# Patient Record
Sex: FEMALE | Race: WHITE | NOT HISPANIC OR LATINO | Employment: UNEMPLOYED | URBAN - METROPOLITAN AREA
[De-identification: names, ages, dates, MRNs, and addresses within clinical notes are randomized per-mention and may not be internally consistent; named-entity substitution may affect disease eponyms.]

---

## 2017-05-12 ENCOUNTER — OFFICE VISIT (OUTPATIENT)
Dept: PULMONOLOGY | Facility: CLINIC | Age: 47
End: 2017-05-12

## 2017-05-12 VITALS
DIASTOLIC BLOOD PRESSURE: 70 MMHG | OXYGEN SATURATION: 96 % | WEIGHT: 129.4 LBS | HEIGHT: 66 IN | HEART RATE: 91 BPM | RESPIRATION RATE: 18 BRPM | BODY MASS INDEX: 20.79 KG/M2 | TEMPERATURE: 98.1 F | SYSTOLIC BLOOD PRESSURE: 118 MMHG

## 2017-05-12 DIAGNOSIS — J44.9 CHRONIC OBSTRUCTIVE PULMONARY DISEASE, UNSPECIFIED COPD TYPE (HCC): ICD-10-CM

## 2017-05-12 DIAGNOSIS — C34.90 MALIGNANT NEOPLASM OF LUNG, UNSPECIFIED LATERALITY, UNSPECIFIED PART OF LUNG (HCC): Primary | ICD-10-CM

## 2017-05-12 DIAGNOSIS — J30.89 PERENNIAL ALLERGIC RHINITIS, UNSPECIFIED ALLERGIC RHINITIS TRIGGER: ICD-10-CM

## 2017-05-12 DIAGNOSIS — C34.91 ADENOCARCINOMA OF RIGHT LUNG, STAGE 1 (HCC): ICD-10-CM

## 2017-05-12 DIAGNOSIS — R06.02 SHORTNESS OF BREATH: ICD-10-CM

## 2017-05-12 PROBLEM — J30.9 ALLERGIC RHINITIS: Status: ACTIVE | Noted: 2017-05-12

## 2017-05-12 PROCEDURE — 94726 PLETHYSMOGRAPHY LUNG VOLUMES: CPT | Performed by: INTERNAL MEDICINE

## 2017-05-12 PROCEDURE — 94060 EVALUATION OF WHEEZING: CPT | Performed by: INTERNAL MEDICINE

## 2017-05-12 PROCEDURE — 94729 DIFFUSING CAPACITY: CPT | Performed by: INTERNAL MEDICINE

## 2017-05-12 PROCEDURE — 94200 LUNG FUNCTION TEST (MBC/MVV): CPT | Performed by: INTERNAL MEDICINE

## 2017-05-12 PROCEDURE — 99214 OFFICE O/P EST MOD 30 MIN: CPT | Performed by: INTERNAL MEDICINE

## 2017-05-12 RX ORDER — CETIRIZINE HYDROCHLORIDE 10 MG/1
10 TABLET ORAL DAILY
COMMUNITY

## 2017-05-24 ENCOUNTER — HOSPITAL ENCOUNTER (OUTPATIENT)
Dept: CT IMAGING | Facility: HOSPITAL | Age: 47
Discharge: HOME | End: 2017-05-24
Attending: INTERNAL MEDICINE | Admitting: INTERNAL MEDICINE

## 2017-05-24 DIAGNOSIS — C34.91 ADENOCARCINOMA OF RIGHT LUNG, STAGE 1 (HCC): ICD-10-CM

## 2017-05-24 PROCEDURE — 71260 CT THORAX DX C+: CPT

## 2017-05-24 PROCEDURE — 0 IOPAMIDOL 61 % SOLUTION: Performed by: INTERNAL MEDICINE

## 2017-05-24 RX ADMIN — IOPAMIDOL 100 ML: 612 INJECTION, SOLUTION INTRAVENOUS at 14:00

## 2017-07-18 ENCOUNTER — OFFICE VISIT (OUTPATIENT)
Dept: CARDIAC SURGERY | Facility: CLINIC | Age: 47
End: 2017-07-18

## 2017-07-18 VITALS
OXYGEN SATURATION: 92 % | SYSTOLIC BLOOD PRESSURE: 105 MMHG | DIASTOLIC BLOOD PRESSURE: 76 MMHG | WEIGHT: 129 LBS | BODY MASS INDEX: 20.73 KG/M2 | HEART RATE: 101 BPM | TEMPERATURE: 97.6 F | HEIGHT: 66 IN

## 2017-07-18 DIAGNOSIS — C34.91 ADENOCARCINOMA OF RIGHT LUNG, STAGE 1 (HCC): Primary | ICD-10-CM

## 2017-07-18 PROCEDURE — 99212 OFFICE O/P EST SF 10 MIN: CPT | Performed by: PHYSICIAN ASSISTANT

## 2017-07-18 NOTE — PROGRESS NOTES
07/18/2017  Patient Information  Moriah Qiu                                                                                          [address]  [place] [address]   1970  'PCP/Referring Physician'  Darline Velasco MD  [phone]  No ref. provider found    Chief Complaint   Patient presents with   • Follow-up     6 month follow up with CT chest. Patient complains of some shortness of breath.   • Lung Cancer       History of Present Illness:  Patient presents to office today for results of her 6 month follow up CT scan of the chest. Patient underwent a Right VAT with right lower lobectomy on 9/15/16 due to right lower lobe adenocarcinoma. The pathologic stage on her final path report was 1A. She states she has had recent shortness of breath, which she attributes to her allergies. She is currently taking allergy medication to combat this. She denies any incisional tenderness or recent hemoptysis. Her recent CT scan of the chest on 5/25/17 reveals no findings suspicious of malignancy or metastasis, which was discussed with the patient and she was understanding. She is otherwise doing well.     Patient Active Problem List   Diagnosis   • Osteoporosis   • Syringomyelia   • Adenocarcinoma of right lung, lower lobe, stage 1   • Osteoarthritis (arthritis due to wear and tear of joints)   • COPD (chronic obstructive pulmonary disease)   • Allergic rhinitis     Past Medical History:   Diagnosis Date   • Back pain    • Esophagus hernia    • GERD (gastroesophageal reflux disease)    • Lung cancer    • Osteoarthritis (arthritis due to wear and tear of joints)    • Osteoporosis    • Uterine cancer 1997    ovarian 1997   • Wears dentures      Past Surgical History:   Procedure Laterality Date   • BRONCHOSCOPY N/A 8/4/2016    Procedure: NAVIGATIONAL BRONCHOSCOPY;  Surgeon: Elder Tompkins MD;  Location: [facility];  Service:    • BRONCHOSCOPY N/A 8/4/2016    Procedure: BRONCHOSCOPY WITH ENDOBRONCHIAL  ULTRASOUND;  Surgeon: Elder Tompkins MD;  Location:  [facility];  Service:    • COLONOSCOPY      2016   • GALLBLADDER SURGERY  2016   • HYSTERECTOMY  1997   • THORACOSCOPY Right 9/15/2016    Procedure: BRONOSCOPY, RIGHT THORACOSCOPY VIDEO ASSISTED LOBECTOMY, MEDIASTINAL LYMPH NODE DISECTION;  Surgeon: Elder Ham MD;  Location:  [facility] OR;  Service:        Current Outpatient Prescriptions:   •  alendronate (FOSAMAX) 70 MG tablet, Take 70 mg by mouth every 7 days. EVERY SUNDAY, Disp: , Rfl:   •  cetirizine (zyrTEC) 10 MG tablet, Take 10 mg by mouth Daily., Disp: , Rfl:   •  Cholecalciferol (VITAMIN D3) 1000 UNITS capsule, Take 1 capsule by mouth daily., Disp: , Rfl:   •  famotidine (PEPCID) 40 MG tablet, Take 40 mg by mouth daily., Disp: , Rfl: 0  •  gabapentin (NEURONTIN) 600 MG tablet, 600 mg 3 (three) times a day., Disp: , Rfl: 0  •  HYDROcodone-acetaminophen (NORCO)  MG per tablet, Take 1 tablet by mouth every 6 (six) hours as needed for severe pain (7-10)., Disp: , Rfl:   •  lidocaine (LIDODERM) 5 %, Place 1 patch on the skin every day. Remove & Discard patch within 12 hours or as directed by MD; PT PLACES ON AT NIGHT AND REMOVES IN THE AM , Disp: , Rfl:   •  ondansetron ODT (ZOFRAN-ODT) 4 MG disintegrating tablet, Take 1 tablet by mouth every 8 (eight) hours as needed (for nausea)., Disp: 30 tablet, Rfl: 0  •  pantoprazole (PROTONIX) 40 MG EC tablet, Take 40 mg by mouth 2 (two) times a day., Disp: , Rfl:   •  RA CALCIUM 600 600 MG tablet, Take 600 mg by mouth 2 (two) times a day with meals., Disp: , Rfl: 0  •  sucralfate (CARAFATE) 1 G tablet, Take 1 g by mouth 4 (four) times a day before meals and nightly., Disp: , Rfl:   No Known Allergies  Social History     Social History   • Marital status:      Spouse name: N/A   • Number of children: 3   • Years of education: N/A     Occupational History   • NOT EMPLOYED       Previous Contractor for the [employer]     Social History Main Topics   •  "Smoking status: Current Every Day Smoker     Packs/day: 1.00     Years: 28.00     Types: Cigarettes   • Smokeless tobacco: Never Used   • Alcohol use No   • Drug use: No   • Sexual activity: Defer     Other Topics Concern   • Not on file     Social History Narrative     Family History   Problem Relation Age of Onset   • Cancer Mother    • Alzheimer's disease Father    • Osteoarthritis Other      Review of Systems   Constitution: Positive for diaphoresis, malaise/fatigue and night sweats. Negative for chills, fever and weight loss.   HENT: Positive for nosebleeds. Negative for headaches, hearing loss, odynophagia and sore throat.    Eyes: Negative.    Cardiovascular: Positive for dyspnea on exertion. Negative for chest pain, leg swelling, orthopnea and palpitations.   Respiratory: Positive for shortness of breath. Negative for cough and hemoptysis.    Endocrine: Negative.  Negative for cold intolerance, heat intolerance, polydipsia, polyphagia and polyuria.   Hematologic/Lymphatic: Bruises/bleeds easily.   Skin: Negative.  Negative for itching and rash.   Musculoskeletal: Positive for back pain and muscle cramps. Negative for joint pain, joint swelling and myalgias.   Gastrointestinal: Positive for abdominal pain, diarrhea, dysphagia and vomiting. Negative for constipation, hematemesis, hematochezia, melena and nausea.   Genitourinary: Negative.  Negative for dysuria, frequency and hematuria.   Neurological: Positive for loss of balance and numbness (left leg). Negative for focal weakness and seizures.   Psychiatric/Behavioral: Negative.  Negative for suicidal ideas.   Allergic/Immunologic: Negative.    All other systems reviewed and are negative.    Vitals:    07/18/17 1132   BP: 105/76   BP Location: Right arm   Pulse: 101   Temp: 97.6 °F (36.4 °C)   SpO2: 92%   Weight: 129 lb (58.5 kg)   Height: 66\" (167.6 cm)      Physical Exam:  Gen - NAD, pleasant, cooperative  CV - Regular rate and rhythm, no murmur gallop or " rub  Pulm - Lungs clear to auscultation without wheeze or rhonchi   GI - Soft, normoactive bowel sounds, non-tender  Ext - Without edema, lower extremities warm and viable  Incision - Right VAT incisions are healing well without evidence of dehiscence    Labs/Imagin17 CT Chest without contrast  IMPRESSION:  1. Interval resection of right lower lobe nodule.   2. Chronic appearing interstitial lung changes elsewhere, including 2  small nodular pleural-based densities in the right upper lung, probably  scarring. Continued imaging followup is suggested. No findings  suspicious of malignancy/metastasis or other new chest disease are seen.    Assessment/Plan:  Patient is a 47 year old  female with history of right lower lobe adenocarcinoma stage 1A s/p right lower lobectomy. She will require CT scan of the chest every 6 months for the first 2 years after surgery. We will schedule her for another CT scan in 6 months and she will follow up in our office to discuss the results. She may contact our office prior to that visit with any questions or concerns she may have.    Patient Active Problem List   Diagnosis   • Osteoporosis   • Syringomyelia   • Adenocarcinoma of right lung, lower lobe, stage 1   • Osteoarthritis (arthritis due to wear and tear of joints)   • COPD (chronic obstructive pulmonary disease)   • Allergic rhinitis     Signed by:

## 2017-07-31 NOTE — PROGRESS NOTES
I have reviewed the notes, assessments, and/or procedures performed by Macario Mahmood, I concur with his documentation of Moriah Qiu.

## 2017-11-15 ENCOUNTER — OFFICE VISIT (OUTPATIENT)
Dept: PULMONOLOGY | Facility: CLINIC | Age: 47
End: 2017-11-15

## 2017-11-15 VITALS
TEMPERATURE: 98.4 F | SYSTOLIC BLOOD PRESSURE: 130 MMHG | BODY MASS INDEX: 21.57 KG/M2 | WEIGHT: 134.2 LBS | HEIGHT: 66 IN | RESPIRATION RATE: 16 BRPM | DIASTOLIC BLOOD PRESSURE: 80 MMHG | HEART RATE: 87 BPM | OXYGEN SATURATION: 92 %

## 2017-11-15 DIAGNOSIS — J30.89 CHRONIC NON-SEASONAL ALLERGIC RHINITIS, UNSPECIFIED TRIGGER: ICD-10-CM

## 2017-11-15 DIAGNOSIS — C34.91 ADENOCARCINOMA OF RIGHT LUNG, STAGE 1 (HCC): ICD-10-CM

## 2017-11-15 DIAGNOSIS — J44.9 CHRONIC OBSTRUCTIVE PULMONARY DISEASE, UNSPECIFIED COPD TYPE (HCC): Primary | ICD-10-CM

## 2017-11-15 DIAGNOSIS — Z72.0 TOBACCO USE: ICD-10-CM

## 2017-11-15 PROCEDURE — 99214 OFFICE O/P EST MOD 30 MIN: CPT | Performed by: INTERNAL MEDICINE

## 2017-11-15 RX ORDER — MONTELUKAST SODIUM 10 MG/1
10 TABLET ORAL DAILY
Refills: 1 | COMMUNITY
Start: 2017-10-22 | End: 2019-01-01

## 2017-11-15 RX ORDER — SODIUM CHLORIDE 0.65 %
1 AEROSOL, SPRAY (ML) NASAL DAILY PRN
Refills: 1 | COMMUNITY
Start: 2017-10-27 | End: 2019-01-01

## 2017-11-15 RX ORDER — FLUTICASONE PROPIONATE 50 MCG
2 SPRAY, SUSPENSION (ML) NASAL DAILY
Refills: 1 | COMMUNITY
Start: 2017-10-27 | End: 2019-01-01

## 2017-11-15 NOTE — PROGRESS NOTES
Subjective:     Chief Complaint:   Chief Complaint   Patient presents with   • Malignant neoplasm of lung       HPI:    Moriah Qiu is a 47 y.o. female here for follow-up of Stage I adenocarcinoma of the lung and COPD.    She has a history of a right lower lobe lobectomy in September 2015.  Stage IA.  There is no evidence of recurrence to date.  She has minimal shortness of breath.  She denies any cough or sputum production.  She does have airway obstruction by PFTs which is mild but she has been asymptomatic so is on no inhaled therapy.  She, unfortunately, continues to smoke.    She denies any hemoptysis, weight loss, chronic cough, or chronic sputum production.  She has had a small amount of chest pain with deep inhalation the last several weeks.    Current medications are:   Current Outpatient Prescriptions:   •  alendronate (FOSAMAX) 70 MG tablet, Take 70 mg by mouth every 7 days. EVERY SUNDAY, Disp: , Rfl:   •  cetirizine (zyrTEC) 10 MG tablet, Take 10 mg by mouth Daily., Disp: , Rfl:   •  Cholecalciferol (VITAMIN D3) 1000 UNITS capsule, Take 1 capsule by mouth daily., Disp: , Rfl:   •  famotidine (PEPCID) 40 MG tablet, Take 40 mg by mouth daily., Disp: , Rfl: 0  •  fluticasone (FLONASE) 50 MCG/ACT nasal spray, , Disp: , Rfl: 1  •  gabapentin (NEURONTIN) 600 MG tablet, 600 mg 3 (three) times a day., Disp: , Rfl: 0  •  HYDROcodone-acetaminophen (NORCO)  MG per tablet, Take 1 tablet by mouth every 6 (six) hours as needed for severe pain (7-10)., Disp: , Rfl:   •  lidocaine (LIDODERM) 5 %, Place 1 patch on the skin every day. Remove & Discard patch within 12 hours or as directed by MD; PT PLACES ON AT NIGHT AND REMOVES IN THE AM , Disp: , Rfl:   •  montelukast (SINGULAIR) 10 MG tablet, Daily., Disp: , Rfl: 1  •  ondansetron ODT (ZOFRAN-ODT) 4 MG disintegrating tablet, Take 1 tablet by mouth every 8 (eight) hours as needed (for nausea)., Disp: 30 tablet, Rfl: 0  •  pantoprazole (PROTONIX) 40 MG EC tablet,  Take 40 mg by mouth 2 (two) times a day., Disp: , Rfl:   •  RA CALCIUM 600 600 MG tablet, Take 600 mg by mouth 2 (two) times a day with meals., Disp: , Rfl: 0  •  RA SALINE NASAL SPRAY 0.65 % nasal spray, , Disp: , Rfl: 1  •  sucralfate (CARAFATE) 1 G tablet, Take 1 g by mouth 4 (four) times a day before meals and nightly., Disp: , Rfl: .      The patient's relevant past medical, surgical, family and social history were reviewed and updated in Epic as appropriate.     ROS:    Review of Systems   Constitutional: Negative for fever and unexpected weight change.   HENT: Positive for congestion.          Objective:    Physical Exam   Constitutional: She is oriented to person, place, and time. She appears well-developed and well-nourished.   HENT:   Head: Normocephalic and atraumatic.   Mouth/Throat: Oropharynx is clear and moist.   Neck: Neck supple. No thyromegaly present.   Cardiovascular: Normal rate and regular rhythm.  Exam reveals no gallop and no friction rub.    No murmur heard.  Pulmonary/Chest: Effort normal. No respiratory distress. She has no wheezes. She has no rales.   Slight rub on right   Musculoskeletal: She exhibits no edema.   Neurological: She is alert and oriented to person, place, and time.   Skin: Skin is warm and dry.   Psychiatric: She has a normal mood and affect. Her behavior is normal.   Vitals reviewed.      Diagnostics:     No additional her CAT scan from May revealed no evidence of disease recurrence.    Assessment:    Problem List Items Addressed This Visit        Pulmonary Problems    COPD (chronic obstructive pulmonary disease) - Primary    Relevant Medications    fluticasone (FLONASE) 50 MCG/ACT nasal spray    montelukast (SINGULAIR) 10 MG tablet    RA SALINE NASAL SPRAY 0.65 % nasal spray    Adenocarcinoma of right lung, lower lobe, stage 1    Overview     1.S/P Bronchoscopy with Right VATS, Right lower lobectomy, Mediastinal lymph node dissection, Intercostal nerve blocks 9/15/16  stage 1A         Relevant Medications    fluticasone (FLONASE) 50 MCG/ACT nasal spray    montelukast (SINGULAIR) 10 MG tablet    RA SALINE NASAL SPRAY 0.65 % nasal spray    Other Relevant Orders    CT Chest With Contrast    Allergic rhinitis       Other    Tobacco use          She does have a slight pleural rub on the right but says she has had some chest pain for several weeks and acute illness.  Hopefully this is just some viral pleurisy.  She is due for follow-up CAT scan so will order this.  If this is stable then we could back off to yearly CT scans for the next 3 years.    Plan:     1. CT of the chest  2. Smoking cessation  3. Continued routine follow-up assuming CT is stable    Discussed in detail with the patient.  She will call prior to her follow up visit for any new problems.    Signed by  Kingston Carrasquillo MD

## 2017-12-21 ENCOUNTER — TELEPHONE (OUTPATIENT)
Dept: OTHER | Facility: HOSPITAL | Age: 47
End: 2017-12-21

## 2017-12-21 NOTE — TELEPHONE ENCOUNTER
Called patient's phone number listed, no answer and voicemail box full. Called spouse who was not with patient at time of call but states their cell reception is not good at home and he will relay message to patient and provided contact phone number for nurse navigator. He understands she needs to schedule f/u CT chest for history of lung cancer. Prior to completion of this note patient called nurse navigator and would like to pursue CT chest. Will notify scheduling department for appointment and encouraged patient to call nurse navigator if no appointment made in the next few days. She v/u and agreeable with plan.

## 2017-12-27 ENCOUNTER — HOSPITAL ENCOUNTER (OUTPATIENT)
Dept: CT IMAGING | Facility: HOSPITAL | Age: 47
Discharge: HOME | End: 2017-12-27
Attending: INTERNAL MEDICINE | Admitting: INTERNAL MEDICINE

## 2017-12-27 DIAGNOSIS — C34.91 ADENOCARCINOMA OF RIGHT LUNG, STAGE 1 (HCC): ICD-10-CM

## 2017-12-27 LAB — CREAT BLDA-MCNC: 0.8 MG/DL (ref 0.6–1.3)

## 2017-12-27 PROCEDURE — 82565 ASSAY OF CREATININE: CPT

## 2017-12-27 PROCEDURE — 71260 CT THORAX DX C+: CPT

## 2017-12-27 PROCEDURE — 0 IOPAMIDOL 61 % SOLUTION: Performed by: INTERNAL MEDICINE

## 2017-12-27 RX ADMIN — IOPAMIDOL 100 ML: 612 INJECTION, SOLUTION INTRAVENOUS at 14:15

## 2018-01-01 ENCOUNTER — DOCUMENTATION (OUTPATIENT)
Dept: PULMONOLOGY | Facility: CLINIC | Age: 48
End: 2018-01-01

## 2018-01-01 ENCOUNTER — HOSPITAL ENCOUNTER (OUTPATIENT)
Dept: RADIATION ONCOLOGY | Facility: HOSPITAL | Age: 48
Setting detail: RADIATION/ONCOLOGY SERIES
Discharge: HOME | End: 2018-07-05

## 2018-01-01 ENCOUNTER — ANESTHESIA EVENT (OUTPATIENT)
Dept: PERIOP | Facility: HOSPITAL | Age: 48
End: 2018-01-01

## 2018-01-01 ENCOUNTER — HOSPITAL ENCOUNTER (OUTPATIENT)
Facility: HOSPITAL | Age: 48
Setting detail: HOSPITAL OUTPATIENT SURGERY
Discharge: HOME | End: 2018-07-20
Attending: THORACIC SURGERY (CARDIOTHORACIC VASCULAR SURGERY) | Admitting: ANESTHESIOLOGY

## 2018-01-01 ENCOUNTER — HOSPITAL ENCOUNTER (OUTPATIENT)
Dept: CT IMAGING | Facility: HOSPITAL | Age: 48
Discharge: HOME | End: 2018-09-17
Attending: INTERNAL MEDICINE | Admitting: INTERNAL MEDICINE

## 2018-01-01 ENCOUNTER — HOSPITAL ENCOUNTER (OUTPATIENT)
Dept: RADIATION ONCOLOGY | Facility: HOSPITAL | Age: 48
Discharge: HOME | End: 2018-08-28

## 2018-01-01 ENCOUNTER — APPOINTMENT (OUTPATIENT)
Dept: ONCOLOGY | Facility: HOSPITAL | Age: 48
End: 2018-01-01

## 2018-01-01 ENCOUNTER — INFUSION (OUTPATIENT)
Dept: ONCOLOGY | Facility: HOSPITAL | Age: 48
End: 2018-01-01

## 2018-01-01 ENCOUNTER — APPOINTMENT (OUTPATIENT)
Dept: GENERAL RADIOLOGY | Facility: HOSPITAL | Age: 48
End: 2018-01-01

## 2018-01-01 ENCOUNTER — HOSPITAL ENCOUNTER (OUTPATIENT)
Dept: PET IMAGING | Facility: HOSPITAL | Age: 48
Discharge: HOME | End: 2018-10-03

## 2018-01-01 ENCOUNTER — HOSPITAL ENCOUNTER (OUTPATIENT)
Dept: RADIATION ONCOLOGY | Facility: HOSPITAL | Age: 48
Discharge: HOME | End: 2018-09-10

## 2018-01-01 ENCOUNTER — HOSPITAL ENCOUNTER (OUTPATIENT)
Dept: RADIATION ONCOLOGY | Facility: HOSPITAL | Age: 48
Discharge: HOME | End: 2018-09-06

## 2018-01-01 ENCOUNTER — OFFICE VISIT (OUTPATIENT)
Dept: ONCOLOGY | Facility: CLINIC | Age: 48
End: 2018-01-01

## 2018-01-01 ENCOUNTER — HOSPITAL ENCOUNTER (OUTPATIENT)
Dept: RADIATION ONCOLOGY | Facility: HOSPITAL | Age: 48
Discharge: HOME | End: 2018-09-24

## 2018-01-01 ENCOUNTER — HOSPITAL ENCOUNTER (OUTPATIENT)
Dept: RADIATION ONCOLOGY | Facility: HOSPITAL | Age: 48
Discharge: HOME | End: 2018-08-09

## 2018-01-01 ENCOUNTER — HOSPITAL ENCOUNTER (OUTPATIENT)
Dept: RADIATION ONCOLOGY | Facility: HOSPITAL | Age: 48
Setting detail: RADIATION/ONCOLOGY SERIES
Discharge: HOME | End: 2018-08-06

## 2018-01-01 ENCOUNTER — TELEPHONE (OUTPATIENT)
Dept: OTHER | Facility: HOSPITAL | Age: 48
End: 2018-01-01

## 2018-01-01 ENCOUNTER — HOSPITAL ENCOUNTER (OUTPATIENT)
Dept: RADIATION ONCOLOGY | Facility: HOSPITAL | Age: 48
Discharge: HOME | End: 2018-08-24

## 2018-01-01 ENCOUNTER — APPOINTMENT (OUTPATIENT)
Dept: CT IMAGING | Facility: HOSPITAL | Age: 48
End: 2018-01-01
Attending: INTERNAL MEDICINE

## 2018-01-01 ENCOUNTER — HOSPITAL ENCOUNTER (OUTPATIENT)
Dept: RADIATION ONCOLOGY | Facility: HOSPITAL | Age: 48
Discharge: HOME | End: 2018-09-13

## 2018-01-01 ENCOUNTER — HOSPITAL ENCOUNTER (OUTPATIENT)
Dept: MRI IMAGING | Facility: HOSPITAL | Age: 48
Discharge: HOME | End: 2018-06-26
Attending: INTERNAL MEDICINE | Admitting: INTERNAL MEDICINE

## 2018-01-01 ENCOUNTER — DOCUMENTATION (OUTPATIENT)
Dept: SOCIAL WORK | Facility: HOSPITAL | Age: 48
End: 2018-01-01

## 2018-01-01 ENCOUNTER — HOSPITAL ENCOUNTER (OUTPATIENT)
Facility: HOSPITAL | Age: 48
Setting detail: HOSPITAL OUTPATIENT SURGERY
Discharge: HOME | End: 2018-12-28
Attending: THORACIC SURGERY (CARDIOTHORACIC VASCULAR SURGERY) | Admitting: THORACIC SURGERY (CARDIOTHORACIC VASCULAR SURGERY)

## 2018-01-01 ENCOUNTER — TELEPHONE (OUTPATIENT)
Dept: ONCOLOGY | Facility: CLINIC | Age: 48
End: 2018-01-01

## 2018-01-01 ENCOUNTER — DOCUMENTATION (OUTPATIENT)
Dept: GENETICS | Facility: HOSPITAL | Age: 48
End: 2018-01-01

## 2018-01-01 ENCOUNTER — TELEPHONE (OUTPATIENT)
Dept: CARDIAC SURGERY | Facility: CLINIC | Age: 48
End: 2018-01-01

## 2018-01-01 ENCOUNTER — HOSPITAL ENCOUNTER (OUTPATIENT)
Dept: PET IMAGING | Facility: HOSPITAL | Age: 48
Discharge: HOME | End: 2018-10-03
Admitting: NURSE PRACTITIONER

## 2018-01-01 ENCOUNTER — HOSPITAL ENCOUNTER (OUTPATIENT)
Dept: RADIATION ONCOLOGY | Facility: HOSPITAL | Age: 48
Discharge: HOME | End: 2018-08-10

## 2018-01-01 ENCOUNTER — HOSPITAL ENCOUNTER (OUTPATIENT)
Dept: RADIATION ONCOLOGY | Facility: HOSPITAL | Age: 48
Discharge: HOME | End: 2018-09-25

## 2018-01-01 ENCOUNTER — EDUCATION (OUTPATIENT)
Dept: ONCOLOGY | Facility: HOSPITAL | Age: 48
End: 2018-01-01

## 2018-01-01 ENCOUNTER — CONSULT (OUTPATIENT)
Dept: ONCOLOGY | Facility: CLINIC | Age: 48
End: 2018-01-01

## 2018-01-01 ENCOUNTER — HOSPITAL ENCOUNTER (OUTPATIENT)
Dept: RADIATION ONCOLOGY | Facility: HOSPITAL | Age: 48
Discharge: HOME | End: 2018-08-21

## 2018-01-01 ENCOUNTER — HOSPITAL ENCOUNTER (OUTPATIENT)
Dept: RADIATION ONCOLOGY | Facility: HOSPITAL | Age: 48
Discharge: HOME | End: 2018-08-15

## 2018-01-01 ENCOUNTER — OFFICE VISIT (OUTPATIENT)
Dept: RADIATION ONCOLOGY | Facility: HOSPITAL | Age: 48
End: 2018-01-01

## 2018-01-01 ENCOUNTER — OFFICE VISIT (OUTPATIENT)
Dept: PULMONOLOGY | Facility: CLINIC | Age: 48
End: 2018-01-01

## 2018-01-01 ENCOUNTER — HOSPITAL ENCOUNTER (OUTPATIENT)
Dept: RADIATION ONCOLOGY | Facility: HOSPITAL | Age: 48
Discharge: HOME | End: 2018-08-23

## 2018-01-01 ENCOUNTER — DOCUMENTATION (OUTPATIENT)
Dept: NUTRITION | Facility: HOSPITAL | Age: 48
End: 2018-01-01

## 2018-01-01 ENCOUNTER — APPOINTMENT (OUTPATIENT)
Dept: MRI IMAGING | Facility: HOSPITAL | Age: 48
End: 2018-01-01

## 2018-01-01 ENCOUNTER — DOCUMENTATION (OUTPATIENT)
Dept: ONCOLOGY | Facility: CLINIC | Age: 48
End: 2018-01-01

## 2018-01-01 ENCOUNTER — HOSPITAL ENCOUNTER (OUTPATIENT)
Dept: RADIATION ONCOLOGY | Facility: HOSPITAL | Age: 48
Discharge: HOME | End: 2018-08-22

## 2018-01-01 ENCOUNTER — HOSPITAL ENCOUNTER (OUTPATIENT)
Dept: RADIATION ONCOLOGY | Facility: HOSPITAL | Age: 48
Discharge: HOME | End: 2018-07-20

## 2018-01-01 ENCOUNTER — ANESTHESIA EVENT (OUTPATIENT)
Dept: GASTROENTEROLOGY | Facility: HOSPITAL | Age: 48
End: 2018-01-01

## 2018-01-01 ENCOUNTER — HOSPITAL ENCOUNTER (OUTPATIENT)
Dept: RADIATION ONCOLOGY | Facility: HOSPITAL | Age: 48
Discharge: HOME | End: 2018-09-12

## 2018-01-01 ENCOUNTER — HOSPITAL ENCOUNTER (OUTPATIENT)
Dept: RADIATION ONCOLOGY | Facility: HOSPITAL | Age: 48
Discharge: HOME | End: 2018-08-29

## 2018-01-01 ENCOUNTER — HOSPITAL ENCOUNTER (OUTPATIENT)
Dept: RADIATION ONCOLOGY | Facility: HOSPITAL | Age: 48
Discharge: HOME | End: 2018-09-19

## 2018-01-01 ENCOUNTER — TELEPHONE (OUTPATIENT)
Dept: INFUSION THERAPY | Facility: HOSPITAL | Age: 48
End: 2018-01-01

## 2018-01-01 ENCOUNTER — HOSPITAL ENCOUNTER (OUTPATIENT)
Dept: RADIATION ONCOLOGY | Facility: HOSPITAL | Age: 48
Setting detail: RADIATION/ONCOLOGY SERIES
Discharge: HOME | End: 2018-09-04

## 2018-01-01 ENCOUNTER — ANESTHESIA (OUTPATIENT)
Dept: PERIOP | Facility: HOSPITAL | Age: 48
End: 2018-01-01

## 2018-01-01 ENCOUNTER — HOSPITAL ENCOUNTER (OUTPATIENT)
Dept: RADIATION ONCOLOGY | Facility: HOSPITAL | Age: 48
Discharge: HOME | End: 2018-08-13

## 2018-01-01 ENCOUNTER — HOSPITAL ENCOUNTER (OUTPATIENT)
Dept: PET IMAGING | Facility: HOSPITAL | Age: 48
Discharge: HOME | End: 2018-06-26
Attending: INTERNAL MEDICINE | Admitting: INTERNAL MEDICINE

## 2018-01-01 ENCOUNTER — HOSPITAL ENCOUNTER (OUTPATIENT)
Dept: CT IMAGING | Facility: HOSPITAL | Age: 48
Discharge: HOME | End: 2018-10-19
Admitting: NURSE PRACTITIONER

## 2018-01-01 ENCOUNTER — HOSPITAL ENCOUNTER (OUTPATIENT)
Dept: RADIATION ONCOLOGY | Facility: HOSPITAL | Age: 48
Discharge: HOME | End: 2018-08-27

## 2018-01-01 ENCOUNTER — HOSPITAL ENCOUNTER (OUTPATIENT)
Dept: ONCOLOGY | Facility: HOSPITAL | Age: 48
Setting detail: INFUSION SERIES
Discharge: HOME | End: 2018-12-20

## 2018-01-01 ENCOUNTER — HOSPITAL ENCOUNTER (OUTPATIENT)
Dept: PET IMAGING | Facility: HOSPITAL | Age: 48
Discharge: HOME | End: 2018-06-26
Attending: INTERNAL MEDICINE

## 2018-01-01 ENCOUNTER — HOSPITAL ENCOUNTER (OUTPATIENT)
Dept: RADIATION ONCOLOGY | Facility: HOSPITAL | Age: 48
Discharge: HOME | End: 2018-09-26

## 2018-01-01 ENCOUNTER — HOSPITAL ENCOUNTER (OUTPATIENT)
Dept: CT IMAGING | Facility: HOSPITAL | Age: 48
Discharge: HOME | End: 2018-05-25
Attending: INTERNAL MEDICINE | Admitting: INTERNAL MEDICINE

## 2018-01-01 ENCOUNTER — OFFICE VISIT (OUTPATIENT)
Dept: CARDIAC SURGERY | Facility: CLINIC | Age: 48
End: 2018-01-01

## 2018-01-01 ENCOUNTER — APPOINTMENT (OUTPATIENT)
Dept: PREADMISSION TESTING | Facility: HOSPITAL | Age: 48
End: 2018-01-01

## 2018-01-01 ENCOUNTER — ANESTHESIA (OUTPATIENT)
Dept: GASTROENTEROLOGY | Facility: HOSPITAL | Age: 48
End: 2018-01-01

## 2018-01-01 ENCOUNTER — TELEPHONE (OUTPATIENT)
Dept: GENETICS | Facility: HOSPITAL | Age: 48
End: 2018-01-01

## 2018-01-01 ENCOUNTER — HOSPITAL ENCOUNTER (OUTPATIENT)
Dept: RADIATION ONCOLOGY | Facility: HOSPITAL | Age: 48
Discharge: HOME | End: 2018-09-07

## 2018-01-01 ENCOUNTER — HOSPITAL ENCOUNTER (OUTPATIENT)
Dept: RADIATION ONCOLOGY | Facility: HOSPITAL | Age: 48
Discharge: HOME | End: 2018-09-20

## 2018-01-01 ENCOUNTER — HOSPITAL ENCOUNTER (OUTPATIENT)
Dept: RADIATION ONCOLOGY | Facility: HOSPITAL | Age: 48
Discharge: HOME | End: 2018-08-16

## 2018-01-01 ENCOUNTER — PREP FOR SURGERY (OUTPATIENT)
Dept: OTHER | Facility: HOSPITAL | Age: 48
End: 2018-01-01

## 2018-01-01 ENCOUNTER — APPOINTMENT (OUTPATIENT)
Dept: PET IMAGING | Facility: HOSPITAL | Age: 48
End: 2018-01-01

## 2018-01-01 ENCOUNTER — HOSPITAL ENCOUNTER (OUTPATIENT)
Dept: MRI IMAGING | Facility: HOSPITAL | Age: 48
Discharge: HOME | End: 2018-10-19

## 2018-01-01 ENCOUNTER — CLINICAL SUPPORT (OUTPATIENT)
Dept: GENETICS | Facility: HOSPITAL | Age: 48
End: 2018-01-01

## 2018-01-01 ENCOUNTER — HOSPITAL ENCOUNTER (OUTPATIENT)
Dept: RADIATION ONCOLOGY | Facility: HOSPITAL | Age: 48
Discharge: HOME | End: 2018-08-14

## 2018-01-01 ENCOUNTER — HOSPITAL ENCOUNTER (OUTPATIENT)
Dept: RADIATION ONCOLOGY | Facility: HOSPITAL | Age: 48
Discharge: HOME | End: 2018-09-17

## 2018-01-01 ENCOUNTER — TELEPHONE (OUTPATIENT)
Dept: SOCIAL WORK | Facility: HOSPITAL | Age: 48
End: 2018-01-01

## 2018-01-01 ENCOUNTER — HOSPITAL ENCOUNTER (OUTPATIENT)
Dept: RADIATION ONCOLOGY | Facility: HOSPITAL | Age: 48
Discharge: HOME | End: 2018-09-18

## 2018-01-01 ENCOUNTER — HOSPITAL ENCOUNTER (OUTPATIENT)
Dept: RADIATION ONCOLOGY | Facility: HOSPITAL | Age: 48
Discharge: HOME | End: 2018-09-14

## 2018-01-01 ENCOUNTER — LAB (OUTPATIENT)
Dept: LAB | Facility: HOSPITAL | Age: 48
End: 2018-01-01

## 2018-01-01 ENCOUNTER — APPOINTMENT (OUTPATIENT)
Dept: CT IMAGING | Facility: HOSPITAL | Age: 48
End: 2018-01-01

## 2018-01-01 ENCOUNTER — HOSPITAL ENCOUNTER (OUTPATIENT)
Dept: RADIATION ONCOLOGY | Facility: HOSPITAL | Age: 48
Discharge: HOME | End: 2018-08-20

## 2018-01-01 ENCOUNTER — HOSPITAL ENCOUNTER (OUTPATIENT)
Dept: RADIATION ONCOLOGY | Facility: HOSPITAL | Age: 48
Discharge: HOME | End: 2018-09-11

## 2018-01-01 ENCOUNTER — HOSPITAL ENCOUNTER (OUTPATIENT)
Dept: RADIATION ONCOLOGY | Facility: HOSPITAL | Age: 48
Discharge: HOME | End: 2018-09-21

## 2018-01-01 ENCOUNTER — TRANSCRIBE ORDERS (OUTPATIENT)
Dept: PULMONOLOGY | Facility: CLINIC | Age: 48
End: 2018-01-01

## 2018-01-01 ENCOUNTER — HOSPITAL ENCOUNTER (OUTPATIENT)
Dept: RADIATION ONCOLOGY | Facility: HOSPITAL | Age: 48
Discharge: HOME | End: 2018-08-06

## 2018-01-01 ENCOUNTER — HOSPITAL ENCOUNTER (OUTPATIENT)
Dept: MRI IMAGING | Facility: HOSPITAL | Age: 48
End: 2018-01-01

## 2018-01-01 ENCOUNTER — HOSPITAL ENCOUNTER (OUTPATIENT)
Dept: RADIATION ONCOLOGY | Facility: HOSPITAL | Age: 48
Setting detail: RADIATION/ONCOLOGY SERIES
Discharge: HOME | End: 2018-10-25

## 2018-01-01 ENCOUNTER — APPOINTMENT (OUTPATIENT)
Dept: LAB | Facility: HOSPITAL | Age: 48
End: 2018-01-01

## 2018-01-01 ENCOUNTER — HOSPITAL ENCOUNTER (OUTPATIENT)
Facility: HOSPITAL | Age: 48
Setting detail: HOSPITAL OUTPATIENT SURGERY
Discharge: HOME | End: 2018-06-05
Attending: INTERNAL MEDICINE | Admitting: INTERNAL MEDICINE

## 2018-01-01 ENCOUNTER — HOSPITAL ENCOUNTER (OUTPATIENT)
Dept: RADIATION ONCOLOGY | Facility: HOSPITAL | Age: 48
Discharge: HOME | End: 2018-08-17

## 2018-01-01 VITALS
SYSTOLIC BLOOD PRESSURE: 120 MMHG | BODY MASS INDEX: 20.89 KG/M2 | RESPIRATION RATE: 18 BRPM | HEIGHT: 66 IN | TEMPERATURE: 98.1 F | HEART RATE: 89 BPM | OXYGEN SATURATION: 95 % | DIASTOLIC BLOOD PRESSURE: 86 MMHG | WEIGHT: 130 LBS

## 2018-01-01 VITALS — WEIGHT: 136.8 LBS | BODY MASS INDEX: 22.08 KG/M2

## 2018-01-01 VITALS
DIASTOLIC BLOOD PRESSURE: 67 MMHG | BODY MASS INDEX: 20.09 KG/M2 | HEIGHT: 67 IN | TEMPERATURE: 97 F | SYSTOLIC BLOOD PRESSURE: 97 MMHG | WEIGHT: 128 LBS | OXYGEN SATURATION: 90 % | RESPIRATION RATE: 18 BRPM | HEART RATE: 104 BPM

## 2018-01-01 VITALS
BODY MASS INDEX: 22.82 KG/M2 | HEIGHT: 66 IN | DIASTOLIC BLOOD PRESSURE: 69 MMHG | HEART RATE: 89 BPM | WEIGHT: 142 LBS | SYSTOLIC BLOOD PRESSURE: 102 MMHG | RESPIRATION RATE: 18 BRPM | TEMPERATURE: 97.9 F

## 2018-01-01 VITALS
RESPIRATION RATE: 20 BRPM | HEIGHT: 67 IN | SYSTOLIC BLOOD PRESSURE: 100 MMHG | HEART RATE: 72 BPM | TEMPERATURE: 97.6 F | DIASTOLIC BLOOD PRESSURE: 72 MMHG | WEIGHT: 144 LBS | BODY MASS INDEX: 22.6 KG/M2 | OXYGEN SATURATION: 92 %

## 2018-01-01 VITALS
RESPIRATION RATE: 20 BRPM | SYSTOLIC BLOOD PRESSURE: 113 MMHG | HEART RATE: 102 BPM | DIASTOLIC BLOOD PRESSURE: 70 MMHG | TEMPERATURE: 98 F | OXYGEN SATURATION: 98 %

## 2018-01-01 VITALS
SYSTOLIC BLOOD PRESSURE: 116 MMHG | BODY MASS INDEX: 21.97 KG/M2 | WEIGHT: 140 LBS | RESPIRATION RATE: 20 BRPM | HEART RATE: 94 BPM | DIASTOLIC BLOOD PRESSURE: 71 MMHG | HEIGHT: 67 IN | TEMPERATURE: 98 F

## 2018-01-01 VITALS — WEIGHT: 138 LBS | BODY MASS INDEX: 22.27 KG/M2

## 2018-01-01 VITALS
DIASTOLIC BLOOD PRESSURE: 75 MMHG | HEIGHT: 66 IN | HEART RATE: 100 BPM | TEMPERATURE: 97.5 F | OXYGEN SATURATION: 93 % | WEIGHT: 141.6 LBS | SYSTOLIC BLOOD PRESSURE: 97 MMHG | BODY MASS INDEX: 22.76 KG/M2

## 2018-01-01 VITALS
HEIGHT: 64 IN | TEMPERATURE: 98.4 F | HEART RATE: 86 BPM | SYSTOLIC BLOOD PRESSURE: 130 MMHG | DIASTOLIC BLOOD PRESSURE: 72 MMHG | WEIGHT: 146 LBS | OXYGEN SATURATION: 93 % | BODY MASS INDEX: 24.92 KG/M2

## 2018-01-01 VITALS
HEIGHT: 66 IN | SYSTOLIC BLOOD PRESSURE: 109 MMHG | TEMPERATURE: 97.6 F | WEIGHT: 137 LBS | BODY MASS INDEX: 22.02 KG/M2 | DIASTOLIC BLOOD PRESSURE: 70 MMHG | HEART RATE: 115 BPM | OXYGEN SATURATION: 94 % | RESPIRATION RATE: 20 BRPM

## 2018-01-01 VITALS
SYSTOLIC BLOOD PRESSURE: 104 MMHG | HEART RATE: 84 BPM | WEIGHT: 142 LBS | BODY MASS INDEX: 22.82 KG/M2 | RESPIRATION RATE: 16 BRPM | HEIGHT: 66 IN | DIASTOLIC BLOOD PRESSURE: 72 MMHG | TEMPERATURE: 97.5 F

## 2018-01-01 VITALS — BODY MASS INDEX: 22.93 KG/M2 | WEIGHT: 142 LBS

## 2018-01-01 VITALS
RESPIRATION RATE: 16 BRPM | HEART RATE: 89 BPM | TEMPERATURE: 98.3 F | DIASTOLIC BLOOD PRESSURE: 72 MMHG | SYSTOLIC BLOOD PRESSURE: 104 MMHG | WEIGHT: 140.8 LBS | HEIGHT: 67 IN | BODY MASS INDEX: 22.1 KG/M2

## 2018-01-01 VITALS
HEART RATE: 102 BPM | WEIGHT: 135.4 LBS | HEIGHT: 66 IN | BODY MASS INDEX: 20.73 KG/M2 | DIASTOLIC BLOOD PRESSURE: 67 MMHG | WEIGHT: 129 LBS | BODY MASS INDEX: 21.85 KG/M2 | OXYGEN SATURATION: 87 % | SYSTOLIC BLOOD PRESSURE: 97 MMHG | TEMPERATURE: 97.9 F

## 2018-01-01 VITALS
HEIGHT: 66 IN | WEIGHT: 131 LBS | RESPIRATION RATE: 18 BRPM | SYSTOLIC BLOOD PRESSURE: 118 MMHG | TEMPERATURE: 97.9 F | DIASTOLIC BLOOD PRESSURE: 68 MMHG | HEART RATE: 114 BPM | BODY MASS INDEX: 21.05 KG/M2

## 2018-01-01 VITALS
SYSTOLIC BLOOD PRESSURE: 110 MMHG | OXYGEN SATURATION: 90 % | TEMPERATURE: 97.4 F | WEIGHT: 135.2 LBS | HEART RATE: 96 BPM | DIASTOLIC BLOOD PRESSURE: 50 MMHG | HEIGHT: 67 IN | BODY MASS INDEX: 21.22 KG/M2

## 2018-01-01 VITALS
BODY MASS INDEX: 21.05 KG/M2 | HEART RATE: 110 BPM | WEIGHT: 131 LBS | OXYGEN SATURATION: 95 % | HEIGHT: 66 IN | TEMPERATURE: 97.5 F | SYSTOLIC BLOOD PRESSURE: 92 MMHG | DIASTOLIC BLOOD PRESSURE: 71 MMHG | RESPIRATION RATE: 18 BRPM

## 2018-01-01 VITALS
HEART RATE: 92 BPM | WEIGHT: 142 LBS | BODY MASS INDEX: 22.82 KG/M2 | TEMPERATURE: 97.1 F | DIASTOLIC BLOOD PRESSURE: 73 MMHG | RESPIRATION RATE: 18 BRPM | SYSTOLIC BLOOD PRESSURE: 111 MMHG | HEIGHT: 66 IN

## 2018-01-01 VITALS
TEMPERATURE: 97.9 F | SYSTOLIC BLOOD PRESSURE: 109 MMHG | HEIGHT: 66 IN | WEIGHT: 132 LBS | BODY MASS INDEX: 21.21 KG/M2 | HEART RATE: 104 BPM | DIASTOLIC BLOOD PRESSURE: 68 MMHG | RESPIRATION RATE: 12 BRPM

## 2018-01-01 VITALS
WEIGHT: 142 LBS | HEART RATE: 86 BPM | DIASTOLIC BLOOD PRESSURE: 65 MMHG | BODY MASS INDEX: 22.29 KG/M2 | HEIGHT: 67 IN | TEMPERATURE: 97.3 F | SYSTOLIC BLOOD PRESSURE: 124 MMHG | RESPIRATION RATE: 20 BRPM

## 2018-01-01 VITALS
BODY MASS INDEX: 21.29 KG/M2 | TEMPERATURE: 98 F | DIASTOLIC BLOOD PRESSURE: 71 MMHG | HEART RATE: 112 BPM | RESPIRATION RATE: 16 BRPM | WEIGHT: 132.5 LBS | SYSTOLIC BLOOD PRESSURE: 108 MMHG | HEIGHT: 66 IN

## 2018-01-01 VITALS — WEIGHT: 131.7 LBS | BODY MASS INDEX: 21.26 KG/M2

## 2018-01-01 VITALS
SYSTOLIC BLOOD PRESSURE: 110 MMHG | DIASTOLIC BLOOD PRESSURE: 74 MMHG | TEMPERATURE: 97.7 F | BODY MASS INDEX: 22.5 KG/M2 | WEIGHT: 140 LBS | HEART RATE: 71 BPM | HEIGHT: 66 IN | RESPIRATION RATE: 18 BRPM | OXYGEN SATURATION: 92 %

## 2018-01-01 VITALS — WEIGHT: 142.2 LBS | BODY MASS INDEX: 22.96 KG/M2

## 2018-01-01 VITALS — WEIGHT: 141 LBS | BODY MASS INDEX: 22.77 KG/M2

## 2018-01-01 VITALS — WEIGHT: 138.6 LBS | BODY MASS INDEX: 22.37 KG/M2

## 2018-01-01 VITALS — BODY MASS INDEX: 22.6 KG/M2 | WEIGHT: 144 LBS | HEIGHT: 67 IN

## 2018-01-01 DIAGNOSIS — C79.51 BONE METASTASES: ICD-10-CM

## 2018-01-01 DIAGNOSIS — R91.1 LUNG NODULE: Primary | ICD-10-CM

## 2018-01-01 DIAGNOSIS — C34.81 MALIGNANT NEOPLASM OF OVERLAPPING SITES OF RIGHT LUNG (HCC): Primary | ICD-10-CM

## 2018-01-01 DIAGNOSIS — C79.51 BONE METASTASIS: ICD-10-CM

## 2018-01-01 DIAGNOSIS — C34.91 ADENOCARCINOMA OF RIGHT LUNG, STAGE 1 (HCC): ICD-10-CM

## 2018-01-01 DIAGNOSIS — Z80.41 FAMILY HISTORY OF OVARIAN CANCER: ICD-10-CM

## 2018-01-01 DIAGNOSIS — C34.91 ADENOCARCINOMA, LUNG, RIGHT (HCC): Primary | ICD-10-CM

## 2018-01-01 DIAGNOSIS — R91.1 LUNG NODULE: ICD-10-CM

## 2018-01-01 DIAGNOSIS — C34.90 MALIGNANT NEOPLASM OF LUNG, UNSPECIFIED LATERALITY, UNSPECIFIED PART OF LUNG (HCC): Primary | ICD-10-CM

## 2018-01-01 DIAGNOSIS — C34.90 MALIGNANT NEOPLASM OF LUNG, UNSPECIFIED LATERALITY, UNSPECIFIED PART OF LUNG (HCC): ICD-10-CM

## 2018-01-01 DIAGNOSIS — C34.91 ADENOCARCINOMA OF RIGHT LUNG, STAGE 1 (HCC): Primary | ICD-10-CM

## 2018-01-01 DIAGNOSIS — Z79.899 ENCOUNTER FOR LONG-TERM (CURRENT) USE OF HIGH-RISK MEDICATION: ICD-10-CM

## 2018-01-01 DIAGNOSIS — Z85.42 HISTORY OF UTERINE CANCER: Primary | ICD-10-CM

## 2018-01-01 DIAGNOSIS — Z85.43 HISTORY OF OVARIAN CANCER: ICD-10-CM

## 2018-01-01 DIAGNOSIS — C34.91 ADENOCARCINOMA, LUNG, RIGHT (HCC): ICD-10-CM

## 2018-01-01 DIAGNOSIS — Z80.3 FAMILY HISTORY OF BREAST CANCER: ICD-10-CM

## 2018-01-01 DIAGNOSIS — Z13.79 GENETIC TESTING: Primary | ICD-10-CM

## 2018-01-01 DIAGNOSIS — Z72.0 TOBACCO USE: ICD-10-CM

## 2018-01-01 DIAGNOSIS — Z80.0 FAMILY HISTORY OF COLON CANCER: ICD-10-CM

## 2018-01-01 DIAGNOSIS — C34.91 ADENOCARCINOMA, LUNG, RIGHT (HCC): Primary | Chronic | ICD-10-CM

## 2018-01-01 DIAGNOSIS — C34.90 LUNG CANCER (HCC): ICD-10-CM

## 2018-01-01 DIAGNOSIS — J44.9 CHRONIC OBSTRUCTIVE PULMONARY DISEASE, UNSPECIFIED COPD TYPE (HCC): ICD-10-CM

## 2018-01-01 DIAGNOSIS — C34.90 MALIGNANT NEOPLASM OF BRONCHUS AND LUNG (HCC): ICD-10-CM

## 2018-01-01 DIAGNOSIS — C79.51 BONE METASTASES: Primary | ICD-10-CM

## 2018-01-01 DIAGNOSIS — Z85.118 HISTORY OF LUNG CANCER: ICD-10-CM

## 2018-01-01 DIAGNOSIS — C34.90 MALIGNANT NEOPLASM OF BRONCHUS AND LUNG (HCC): Primary | ICD-10-CM

## 2018-01-01 LAB
ALBUMIN SERPL-MCNC: 3.95 G/DL (ref 3.2–4.8)
ALBUMIN SERPL-MCNC: 3.96 G/DL (ref 3.2–4.8)
ALBUMIN SERPL-MCNC: 4.12 G/DL (ref 3.2–4.8)
ALBUMIN SERPL-MCNC: 4.13 G/DL (ref 3.2–4.8)
ALBUMIN SERPL-MCNC: 4.3 G/DL (ref 3.2–4.8)
ALBUMIN SERPL-MCNC: 4.44 G/DL (ref 3.2–4.8)
ALBUMIN SERPL-MCNC: 4.7 G/DL (ref 3.2–4.8)
ALBUMIN/GLOB SERPL: 1.5 G/DL (ref 1.5–2.5)
ALBUMIN/GLOB SERPL: 1.8 G/DL (ref 1.5–2.5)
ALBUMIN/GLOB SERPL: 1.9 G/DL (ref 1.5–2.5)
ALP SERPL-CCNC: 113 U/L (ref 25–100)
ALP SERPL-CCNC: 118 U/L (ref 25–100)
ALP SERPL-CCNC: 118 U/L (ref 25–100)
ALP SERPL-CCNC: 122 U/L (ref 25–100)
ALP SERPL-CCNC: 125 U/L (ref 25–100)
ALP SERPL-CCNC: 196 U/L (ref 25–100)
ALP SERPL-CCNC: 95 U/L (ref 25–100)
ALT SERPL W P-5'-P-CCNC: 10 U/L (ref 7–40)
ALT SERPL W P-5'-P-CCNC: 12 U/L (ref 7–40)
ALT SERPL W P-5'-P-CCNC: 12 U/L (ref 7–40)
ALT SERPL W P-5'-P-CCNC: 15 U/L (ref 7–40)
ALT SERPL W P-5'-P-CCNC: 5 U/L (ref 7–40)
ALT SERPL W P-5'-P-CCNC: 7 U/L (ref 7–40)
ALT SERPL W P-5'-P-CCNC: 8 U/L (ref 7–40)
ANION GAP SERPL CALCULATED.3IONS-SCNC: 11 MMOL/L (ref 3–11)
ANION GAP SERPL CALCULATED.3IONS-SCNC: 12 MMOL/L (ref 3–11)
ANION GAP SERPL CALCULATED.3IONS-SCNC: 2 MMOL/L (ref 3–11)
ANION GAP SERPL CALCULATED.3IONS-SCNC: 3 MMOL/L (ref 3–11)
ANION GAP SERPL CALCULATED.3IONS-SCNC: 7 MMOL/L (ref 3–11)
ANION GAP SERPL CALCULATED.3IONS-SCNC: 7 MMOL/L (ref 3–11)
ANION GAP SERPL CALCULATED.3IONS-SCNC: 9 MMOL/L (ref 3–11)
ANION GAP SERPL CALCULATED.3IONS-SCNC: 9 MMOL/L (ref 3–11)
APTT PPP: 28.6 SECONDS (ref 24–31)
APTT PPP: 29.3 SECONDS (ref 24–31)
AST SERPL-CCNC: 13 U/L (ref 0–33)
AST SERPL-CCNC: 14 U/L (ref 0–33)
AST SERPL-CCNC: 15 U/L (ref 0–33)
AST SERPL-CCNC: 16 U/L (ref 0–33)
AST SERPL-CCNC: 17 U/L (ref 0–33)
AST SERPL-CCNC: 17 U/L (ref 0–33)
AST SERPL-CCNC: 18 U/L (ref 0–33)
BILIRUB SERPL-MCNC: 0 MG/DL (ref 0.3–1.2)
BILIRUB SERPL-MCNC: 0 MG/DL (ref 0.3–1.2)
BILIRUB SERPL-MCNC: 0.1 MG/DL (ref 0.3–1.2)
BUN BLD-MCNC: 13 MG/DL (ref 9–23)
BUN BLD-MCNC: 15 MG/DL (ref 9–23)
BUN BLD-MCNC: 15 MG/DL (ref 9–23)
BUN BLD-MCNC: 16 MG/DL (ref 9–23)
BUN BLD-MCNC: 16 MG/DL (ref 9–23)
BUN BLD-MCNC: 17 MG/DL (ref 9–23)
BUN BLD-MCNC: 18 MG/DL (ref 9–23)
BUN BLD-MCNC: 19 MG/DL (ref 9–23)
BUN/CREAT SERPL: 13 (ref 7–25)
BUN/CREAT SERPL: 15.5 (ref 7–25)
BUN/CREAT SERPL: 15.8 (ref 7–25)
BUN/CREAT SERPL: 16.5 (ref 7–25)
BUN/CREAT SERPL: 19.4 (ref 7–25)
BUN/CREAT SERPL: 21.1 (ref 7–25)
BUN/CREAT SERPL: 23.1 (ref 7–25)
BUN/CREAT SERPL: 24.3 (ref 7–25)
CALCIUM SPEC-SCNC: 8.1 MG/DL (ref 8.7–10.4)
CALCIUM SPEC-SCNC: 8.2 MG/DL (ref 8.7–10.4)
CALCIUM SPEC-SCNC: 8.2 MG/DL (ref 8.7–10.4)
CALCIUM SPEC-SCNC: 8.3 MG/DL (ref 8.7–10.4)
CALCIUM SPEC-SCNC: 8.8 MG/DL (ref 8.7–10.4)
CALCIUM SPEC-SCNC: 8.9 MG/DL (ref 8.7–10.4)
CALCIUM SPEC-SCNC: 8.9 MG/DL (ref 8.7–10.4)
CALCIUM SPEC-SCNC: 9.3 MG/DL (ref 8.7–10.4)
CHLORIDE SERPL-SCNC: 108 MMOL/L (ref 99–109)
CHLORIDE SERPL-SCNC: 108 MMOL/L (ref 99–109)
CHLORIDE SERPL-SCNC: 109 MMOL/L (ref 99–109)
CHLORIDE SERPL-SCNC: 111 MMOL/L (ref 99–109)
CHLORIDE SERPL-SCNC: 111 MMOL/L (ref 99–109)
CHLORIDE SERPL-SCNC: 112 MMOL/L (ref 99–109)
CHLORIDE SERPL-SCNC: 112 MMOL/L (ref 99–109)
CHLORIDE SERPL-SCNC: 116 MMOL/L (ref 99–109)
CO2 SERPL-SCNC: 18 MMOL/L (ref 20–31)
CO2 SERPL-SCNC: 20 MMOL/L (ref 20–31)
CO2 SERPL-SCNC: 22 MMOL/L (ref 20–31)
CO2 SERPL-SCNC: 22 MMOL/L (ref 20–31)
CO2 SERPL-SCNC: 24 MMOL/L (ref 20–31)
CO2 SERPL-SCNC: 26 MMOL/L (ref 20–31)
CORTIS AM PEAK SERPL-MCNC: 16.1 MCG/DL (ref 4.3–22.4)
CORTIS AM PEAK SERPL-MCNC: 18.7 MCG/DL (ref 4.3–22.4)
CREAT BLD-MCNC: 0.7 MG/DL (ref 0.6–1.3)
CREAT BLD-MCNC: 0.71 MG/DL (ref 0.6–1.3)
CREAT BLD-MCNC: 0.78 MG/DL (ref 0.6–1.3)
CREAT BLD-MCNC: 0.95 MG/DL (ref 0.6–1.3)
CREAT BLD-MCNC: 0.97 MG/DL (ref 0.6–1.3)
CREAT BLD-MCNC: 0.98 MG/DL (ref 0.6–1.3)
CREAT BLD-MCNC: 1 MG/DL (ref 0.6–1.3)
CREAT BLD-MCNC: 1.03 MG/DL (ref 0.6–1.3)
CREAT BLDA-MCNC: 0.6 MG/DL (ref 0.6–1.3)
CREAT BLDA-MCNC: 0.7 MG/DL
CREAT BLDA-MCNC: 0.7 MG/DL (ref 0.6–1.3)
CREAT BLDA-MCNC: 1.1 MG/DL (ref 0.6–1.3)
CYTO UR: NORMAL
CYTO UR: NORMAL
DEPRECATED RDW RBC AUTO: 55 FL (ref 37–54)
DEPRECATED RDW RBC AUTO: 55.4 FL (ref 37–54)
ERYTHROCYTE [DISTWIDTH] IN BLOOD BY AUTOMATED COUNT: 14.9 % (ref 11.3–14.5)
ERYTHROCYTE [DISTWIDTH] IN BLOOD BY AUTOMATED COUNT: 15.1 % (ref 11.3–14.5)
ERYTHROCYTE [DISTWIDTH] IN BLOOD BY AUTOMATED COUNT: 16.3 % (ref 11.3–14.5)
ERYTHROCYTE [DISTWIDTH] IN BLOOD BY AUTOMATED COUNT: 16.8 % (ref 11.3–14.5)
ERYTHROCYTE [DISTWIDTH] IN BLOOD BY AUTOMATED COUNT: 17.1 % (ref 11.3–14.5)
ERYTHROCYTE [DISTWIDTH] IN BLOOD BY AUTOMATED COUNT: 17.7 % (ref 11.3–14.5)
ERYTHROCYTE [DISTWIDTH] IN BLOOD BY AUTOMATED COUNT: 18.3 % (ref 11.3–14.5)
ERYTHROCYTE [DISTWIDTH] IN BLOOD BY AUTOMATED COUNT: 20.1 % (ref 11.3–14.5)
GFR SERPL CREATININE-BSD FRML MDRD: 57 ML/MIN/1.73
GFR SERPL CREATININE-BSD FRML MDRD: 59 ML/MIN/1.73
GFR SERPL CREATININE-BSD FRML MDRD: 61 ML/MIN/1.73
GFR SERPL CREATININE-BSD FRML MDRD: 61 ML/MIN/1.73
GFR SERPL CREATININE-BSD FRML MDRD: 63 ML/MIN/1.73
GFR SERPL CREATININE-BSD FRML MDRD: 79 ML/MIN/1.73
GFR SERPL CREATININE-BSD FRML MDRD: 88 ML/MIN/1.73
GFR SERPL CREATININE-BSD FRML MDRD: 89 ML/MIN/1.73
GLOBULIN UR ELPH-MCNC: 2 GM/DL
GLOBULIN UR ELPH-MCNC: 2.1 GM/DL
GLOBULIN UR ELPH-MCNC: 2.2 GM/DL
GLOBULIN UR ELPH-MCNC: 2.3 GM/DL
GLOBULIN UR ELPH-MCNC: 2.4 GM/DL
GLOBULIN UR ELPH-MCNC: 2.6 GM/DL
GLOBULIN UR ELPH-MCNC: 2.7 GM/DL
GLUCOSE BLD-MCNC: 106 MG/DL (ref 70–100)
GLUCOSE BLD-MCNC: 106 MG/DL (ref 70–100)
GLUCOSE BLD-MCNC: 107 MG/DL (ref 70–100)
GLUCOSE BLD-MCNC: 114 MG/DL (ref 70–100)
GLUCOSE BLD-MCNC: 57 MG/DL (ref 70–100)
GLUCOSE BLD-MCNC: 85 MG/DL (ref 70–100)
GLUCOSE BLD-MCNC: 87 MG/DL (ref 70–100)
GLUCOSE BLD-MCNC: 96 MG/DL (ref 70–100)
GLUCOSE BLDC GLUCOMTR-MCNC: 90 MG/DL (ref 70–130)
GLUCOSE BLDC GLUCOMTR-MCNC: 99 MG/DL (ref 70–130)
HBA1C MFR BLD: 4.1 % (ref 4.8–5.6)
HBA1C MFR BLD: 5 % (ref 4.8–5.6)
HCT VFR BLD AUTO: 34.1 % (ref 34.5–44)
HCT VFR BLD AUTO: 36 % (ref 34.5–44)
HCT VFR BLD AUTO: 36.9 % (ref 34.5–44)
HCT VFR BLD AUTO: 39.4 % (ref 34.5–44)
HCT VFR BLD AUTO: 40.1 % (ref 34.5–44)
HCT VFR BLD AUTO: 43.1 % (ref 34.5–44)
HCT VFR BLD AUTO: 43.1 % (ref 34.5–44)
HCT VFR BLD AUTO: 47.7 % (ref 34.5–44)
HGB BLD-MCNC: 11.1 G/DL (ref 11.5–15.5)
HGB BLD-MCNC: 12 G/DL (ref 11.5–15.5)
HGB BLD-MCNC: 12.2 G/DL (ref 11.5–15.5)
HGB BLD-MCNC: 12.6 G/DL (ref 11.5–15.5)
HGB BLD-MCNC: 13.1 G/DL (ref 11.5–15.5)
HGB BLD-MCNC: 13.3 G/DL (ref 11.5–15.5)
HGB BLD-MCNC: 13.3 G/DL (ref 11.5–15.5)
HGB BLD-MCNC: 14.4 G/DL (ref 11.5–15.5)
INR PPP: 0.97 (ref 0.91–1.09)
INR PPP: 1.03 (ref 0.91–1.09)
LAB AP CASE REPORT: NORMAL
LAB AP CLINICAL INFORMATION: NORMAL
LAB AP CLINICAL INFORMATION: NORMAL
LAB AP DIAGNOSIS COMMENT: NORMAL
LAB AP DIAGNOSIS COMMENT: NORMAL
LYMPHOCYTES # BLD AUTO: 0.5 10*3/MM3 (ref 0.6–4.8)
LYMPHOCYTES # BLD AUTO: 0.6 10*3/MM3 (ref 0.6–4.8)
LYMPHOCYTES # BLD AUTO: 0.6 10*3/MM3 (ref 0.6–4.8)
LYMPHOCYTES # BLD AUTO: 0.7 10*3/MM3 (ref 0.6–4.8)
LYMPHOCYTES # BLD AUTO: 1.1 10*3/MM3 (ref 0.6–4.8)
LYMPHOCYTES # BLD AUTO: 1.3 10*3/MM3 (ref 0.6–4.8)
LYMPHOCYTES NFR BLD AUTO: 12.5 % (ref 24–44)
LYMPHOCYTES NFR BLD AUTO: 15.1 % (ref 24–44)
LYMPHOCYTES NFR BLD AUTO: 20.7 % (ref 24–44)
LYMPHOCYTES NFR BLD AUTO: 21.1 % (ref 24–44)
LYMPHOCYTES NFR BLD AUTO: 21.2 % (ref 24–44)
LYMPHOCYTES NFR BLD AUTO: 28 % (ref 24–44)
Lab: NORMAL
Lab: NORMAL
MAGNESIUM SERPL-MCNC: 2 MG/DL (ref 1.3–2.7)
MAGNESIUM SERPL-MCNC: 2 MG/DL (ref 1.3–2.7)
MAGNESIUM SERPL-MCNC: 2.1 MG/DL (ref 1.3–2.7)
MAGNESIUM SERPL-MCNC: 2.1 MG/DL (ref 1.3–2.7)
MCH RBC QN AUTO: 29.9 PG (ref 27–31)
MCH RBC QN AUTO: 31.2 PG (ref 27–31)
MCH RBC QN AUTO: 31.2 PG (ref 27–31)
MCH RBC QN AUTO: 31.5 PG (ref 27–31)
MCH RBC QN AUTO: 31.9 PG (ref 27–31)
MCH RBC QN AUTO: 34.1 PG (ref 27–31)
MCH RBC QN AUTO: 35.3 PG (ref 27–31)
MCH RBC QN AUTO: 36.2 PG (ref 27–31)
MCHC RBC AUTO-ENTMCNC: 30.1 G/DL (ref 32–36)
MCHC RBC AUTO-ENTMCNC: 30.9 G/DL (ref 32–36)
MCHC RBC AUTO-ENTMCNC: 30.9 G/DL (ref 32–36)
MCHC RBC AUTO-ENTMCNC: 32 G/DL (ref 32–36)
MCHC RBC AUTO-ENTMCNC: 32.6 G/DL (ref 32–36)
MCHC RBC AUTO-ENTMCNC: 32.7 G/DL (ref 32–36)
MCHC RBC AUTO-ENTMCNC: 33 G/DL (ref 32–36)
MCHC RBC AUTO-ENTMCNC: 33.3 G/DL (ref 32–36)
MCV RBC AUTO: 101.2 FL (ref 80–99)
MCV RBC AUTO: 101.2 FL (ref 80–99)
MCV RBC AUTO: 102.4 FL (ref 80–99)
MCV RBC AUTO: 106.9 FL (ref 80–99)
MCV RBC AUTO: 111 FL (ref 80–99)
MCV RBC AUTO: 97.6 FL (ref 80–99)
MCV RBC AUTO: 98.5 FL (ref 80–99)
MCV RBC AUTO: 99.4 FL (ref 80–99)
MONOCYTES # BLD AUTO: 0.1 10*3/MM3 (ref 0–1)
MONOCYTES # BLD AUTO: 0.1 10*3/MM3 (ref 0–1)
MONOCYTES # BLD AUTO: 0.2 10*3/MM3 (ref 0–1)
MONOCYTES # BLD AUTO: 0.3 10*3/MM3 (ref 0–1)
MONOCYTES # BLD AUTO: 0.3 10*3/MM3 (ref 0–1)
MONOCYTES # BLD AUTO: 0.5 10*3/MM3 (ref 0–1)
MONOCYTES NFR BLD AUTO: 2 % (ref 0–12)
MONOCYTES NFR BLD AUTO: 3.3 % (ref 0–12)
MONOCYTES NFR BLD AUTO: 6.1 % (ref 0–12)
MONOCYTES NFR BLD AUTO: 6.5 % (ref 0–12)
MONOCYTES NFR BLD AUTO: 9 % (ref 0–12)
MONOCYTES NFR BLD AUTO: 9 % (ref 0–12)
NEUTROPHILS # BLD AUTO: 1.7 10*3/MM3 (ref 1.5–8.3)
NEUTROPHILS # BLD AUTO: 2 10*3/MM3 (ref 1.5–8.3)
NEUTROPHILS # BLD AUTO: 3.1 10*3/MM3 (ref 1.5–8.3)
NEUTROPHILS # BLD AUTO: 3.3 10*3/MM3 (ref 1.5–8.3)
NEUTROPHILS # BLD AUTO: 3.8 10*3/MM3 (ref 1.5–8.3)
NEUTROPHILS # BLD AUTO: 4.8 10*3/MM3 (ref 1.5–8.3)
NEUTROPHILS NFR BLD AUTO: 68.7 % (ref 41–71)
NEUTROPHILS NFR BLD AUTO: 69.8 % (ref 41–71)
NEUTROPHILS NFR BLD AUTO: 70.3 % (ref 41–71)
NEUTROPHILS NFR BLD AUTO: 72.4 % (ref 41–71)
NEUTROPHILS NFR BLD AUTO: 78.8 % (ref 41–71)
NEUTROPHILS NFR BLD AUTO: 85.5 % (ref 41–71)
PATH REPORT.ADDENDUM SPEC: NORMAL
PATH REPORT.FINAL DX SPEC: NORMAL
PATH REPORT.GROSS SPEC: NORMAL
PATH REPORT.GROSS SPEC: NORMAL
PHOSPHATE SERPL-MCNC: 3.1 MG/DL (ref 2.4–5.1)
PLATELET # BLD AUTO: 205 10*3/MM3 (ref 150–450)
PLATELET # BLD AUTO: 209 10*3/MM3 (ref 150–450)
PLATELET # BLD AUTO: 210 10*3/MM3 (ref 150–450)
PLATELET # BLD AUTO: 213 10*3/MM3 (ref 150–450)
PLATELET # BLD AUTO: 223 10*3/MM3 (ref 150–450)
PLATELET # BLD AUTO: 226 10*3/MM3 (ref 150–450)
PLATELET # BLD AUTO: 232 10*3/MM3 (ref 150–450)
PLATELET # BLD AUTO: 244 10*3/MM3 (ref 150–450)
PLATELET # BLD AUTO: 249 10*3/MM3 (ref 150–450)
PMV BLD AUTO: 10.5 FL (ref 6–12)
PMV BLD AUTO: 10.6 FL (ref 6–12)
PMV BLD AUTO: 7.5 FL (ref 6–12)
PMV BLD AUTO: 7.5 FL (ref 6–12)
PMV BLD AUTO: 7.8 FL (ref 6–12)
PMV BLD AUTO: 8.1 FL (ref 6–12)
PMV BLD AUTO: 8.2 FL (ref 6–12)
PMV BLD AUTO: 8.3 FL (ref 6–12)
POTASSIUM BLD-SCNC: 4 MMOL/L (ref 3.5–5.5)
POTASSIUM BLD-SCNC: 4 MMOL/L (ref 3.5–5.5)
POTASSIUM BLD-SCNC: 4.2 MMOL/L (ref 3.5–5.5)
POTASSIUM BLD-SCNC: 4.2 MMOL/L (ref 3.5–5.5)
POTASSIUM BLD-SCNC: 4.3 MMOL/L (ref 3.5–5.5)
POTASSIUM BLD-SCNC: 4.4 MMOL/L (ref 3.5–5.5)
POTASSIUM BLD-SCNC: 4.4 MMOL/L (ref 3.5–5.5)
POTASSIUM BLD-SCNC: 4.6 MMOL/L (ref 3.5–5.5)
PROT SERPL-MCNC: 6 G/DL (ref 5.7–8.2)
PROT SERPL-MCNC: 6 G/DL (ref 5.7–8.2)
PROT SERPL-MCNC: 6.3 G/DL (ref 5.7–8.2)
PROT SERPL-MCNC: 6.6 G/DL (ref 5.7–8.2)
PROT SERPL-MCNC: 6.8 G/DL (ref 5.7–8.2)
PROT SERPL-MCNC: 6.8 G/DL (ref 5.7–8.2)
PROT SERPL-MCNC: 7.3 G/DL (ref 5.7–8.2)
PROTHROMBIN TIME: 10.2 SECONDS (ref 9.6–11.5)
PROTHROMBIN TIME: 10.8 SECONDS (ref 9.6–11.5)
RBC # BLD AUTO: 3.07 10*6/MM3 (ref 3.89–5.14)
RBC # BLD AUTO: 3.45 10*6/MM3 (ref 3.89–5.14)
RBC # BLD AUTO: 3.51 10*6/MM3 (ref 3.89–5.14)
RBC # BLD AUTO: 4 10*6/MM3 (ref 3.89–5.14)
RBC # BLD AUTO: 4.11 10*6/MM3 (ref 3.89–5.14)
RBC # BLD AUTO: 4.26 10*6/MM3 (ref 3.89–5.14)
RBC # BLD AUTO: 4.26 10*6/MM3 (ref 3.89–5.14)
RBC # BLD AUTO: 4.79 10*6/MM3 (ref 3.89–5.14)
SODIUM BLD-SCNC: 135 MMOL/L (ref 132–146)
SODIUM BLD-SCNC: 137 MMOL/L (ref 132–146)
SODIUM BLD-SCNC: 137 MMOL/L (ref 132–146)
SODIUM BLD-SCNC: 140 MMOL/L (ref 132–146)
SODIUM BLD-SCNC: 141 MMOL/L (ref 132–146)
SODIUM BLD-SCNC: 142 MMOL/L (ref 132–146)
SODIUM BLD-SCNC: 142 MMOL/L (ref 132–146)
SODIUM BLD-SCNC: 145 MMOL/L (ref 132–146)
T4 FREE SERPL-MCNC: 0.77 NG/DL (ref 0.89–1.76)
T4 FREE SERPL-MCNC: 0.95 NG/DL (ref 0.89–1.76)
TSH SERPL DL<=0.05 MIU/L-ACNC: 0.58 MIU/ML (ref 0.35–5.35)
TSH SERPL DL<=0.05 MIU/L-ACNC: 1.07 MIU/ML (ref 0.35–5.35)
WBC NRBC COR # BLD: 2.4 10*3/MM3 (ref 3.5–10.8)
WBC NRBC COR # BLD: 2.8 10*3/MM3 (ref 3.5–10.8)
WBC NRBC COR # BLD: 3.62 10*3/MM3 (ref 3.5–10.8)
WBC NRBC COR # BLD: 4.2 10*3/MM3 (ref 3.5–10.8)
WBC NRBC COR # BLD: 4.5 10*3/MM3 (ref 3.5–10.8)
WBC NRBC COR # BLD: 5.23 10*3/MM3 (ref 3.5–10.8)
WBC NRBC COR # BLD: 5.4 10*3/MM3 (ref 3.5–10.8)
WBC NRBC COR # BLD: 5.6 10*3/MM3 (ref 3.5–10.8)

## 2018-01-01 PROCEDURE — 25010000002 MIDAZOLAM PER 1 MG: Performed by: ANESTHESIOLOGY

## 2018-01-01 PROCEDURE — 25010000002 POTASSIUM CHLORIDE PER 2 MEQ OF POTASSIUM: Performed by: NURSE PRACTITIONER

## 2018-01-01 PROCEDURE — 99214 OFFICE O/P EST MOD 30 MIN: CPT | Performed by: NURSE PRACTITIONER

## 2018-01-01 PROCEDURE — 93005 ELECTROCARDIOGRAM TRACING: CPT | Performed by: ANESTHESIOLOGY

## 2018-01-01 PROCEDURE — 77012 CT SCAN FOR NEEDLE BIOPSY: CPT

## 2018-01-01 PROCEDURE — 84439 ASSAY OF FREE THYROXINE: CPT | Performed by: INTERNAL MEDICINE

## 2018-01-01 PROCEDURE — 88305 TISSUE EXAM BY PATHOLOGIST: CPT | Performed by: INTERNAL MEDICINE

## 2018-01-01 PROCEDURE — 25010000002 HEPARIN (PORCINE) PER 1000 UNITS: Performed by: THORACIC SURGERY (CARDIOTHORACIC VASCULAR SURGERY)

## 2018-01-01 PROCEDURE — 77470 SPECIAL RADIATION TREATMENT: CPT | Performed by: RADIOLOGY

## 2018-01-01 PROCEDURE — 36590 REMOVAL TUNNELED CV CATH: CPT | Performed by: THORACIC SURGERY (CARDIOTHORACIC VASCULAR SURGERY)

## 2018-01-01 PROCEDURE — 70553 MRI BRAIN STEM W/O & W/DYE: CPT

## 2018-01-01 PROCEDURE — 96411 CHEMO IV PUSH ADDL DRUG: CPT

## 2018-01-01 PROCEDURE — 85027 COMPLETE CBC AUTOMATED: CPT | Performed by: PHYSICIAN ASSISTANT

## 2018-01-01 PROCEDURE — 94640 AIRWAY INHALATION TREATMENT: CPT

## 2018-01-01 PROCEDURE — 25010000002 HEPARIN FLUSH (PORCINE) 100 UNIT/ML SOLUTION: Performed by: INTERNAL MEDICINE

## 2018-01-01 PROCEDURE — 25010000002 MAGNESIUM SULFATE PER 500 MG OF MAGNESIUM: Performed by: INTERNAL MEDICINE

## 2018-01-01 PROCEDURE — 25010000002 FENTANYL CITRATE (PF) 100 MCG/2ML SOLUTION: Performed by: NURSE ANESTHETIST, CERTIFIED REGISTERED

## 2018-01-01 PROCEDURE — 88305 TISSUE EXAM BY PATHOLOGIST: CPT | Performed by: NURSE PRACTITIONER

## 2018-01-01 PROCEDURE — 77412 RADIATION TX DELIVERY LVL 3: CPT | Performed by: RADIOLOGY

## 2018-01-01 PROCEDURE — 99213 OFFICE O/P EST LOW 20 MIN: CPT | Performed by: THORACIC SURGERY (CARDIOTHORACIC VASCULAR SURGERY)

## 2018-01-01 PROCEDURE — 25010000002 PEMETREXED 100 MG RECONSTITUTED SOLUTION 100 MG VIAL: Performed by: NURSE PRACTITIONER

## 2018-01-01 PROCEDURE — 96360 HYDRATION IV INFUSION INIT: CPT

## 2018-01-01 PROCEDURE — 85610 PROTHROMBIN TIME: CPT | Performed by: NURSE PRACTITIONER

## 2018-01-01 PROCEDURE — 77336 RADIATION PHYSICS CONSULT: CPT | Performed by: RADIOLOGY

## 2018-01-01 PROCEDURE — 82565 ASSAY OF CREATININE: CPT

## 2018-01-01 PROCEDURE — 80053 COMPREHEN METABOLIC PANEL: CPT

## 2018-01-01 PROCEDURE — 0 GADOBENATE DIMEGLUMINE 529 MG/ML SOLUTION: Performed by: NURSE PRACTITIONER

## 2018-01-01 PROCEDURE — 25010000002 FOSAPREPITANT PER 1 MG: Performed by: NURSE PRACTITIONER

## 2018-01-01 PROCEDURE — 99215 OFFICE O/P EST HI 40 MIN: CPT | Performed by: INTERNAL MEDICINE

## 2018-01-01 PROCEDURE — 96376 TX/PRO/DX INJ SAME DRUG ADON: CPT

## 2018-01-01 PROCEDURE — 25010000002 PROPOFOL 1000 MG/ML EMULSION: Performed by: NURSE ANESTHETIST, CERTIFIED REGISTERED

## 2018-01-01 PROCEDURE — 36561 INSERT TUNNELED CV CATH: CPT | Performed by: THORACIC SURGERY (CARDIOTHORACIC VASCULAR SURGERY)

## 2018-01-01 PROCEDURE — 96361 HYDRATE IV INFUSION ADD-ON: CPT

## 2018-01-01 PROCEDURE — 85025 COMPLETE CBC W/AUTO DIFF WBC: CPT

## 2018-01-01 PROCEDURE — 77417 THER RADIOLOGY PORT IMAGE(S): CPT | Performed by: RADIOLOGY

## 2018-01-01 PROCEDURE — 82533 TOTAL CORTISOL: CPT

## 2018-01-01 PROCEDURE — 77280 THER RAD SIMULAJ FIELD SMPL: CPT | Performed by: RADIOLOGY

## 2018-01-01 PROCEDURE — 31652 BRONCH EBUS SAMPLNG 1/2 NODE: CPT | Performed by: INTERNAL MEDICINE

## 2018-01-01 PROCEDURE — 96409 CHEMO IV PUSH SNGL DRUG: CPT

## 2018-01-01 PROCEDURE — 93005 ELECTROCARDIOGRAM TRACING: CPT

## 2018-01-01 PROCEDURE — 77001 FLUOROGUIDE FOR VEIN DEVICE: CPT | Performed by: THORACIC SURGERY (CARDIOTHORACIC VASCULAR SURGERY)

## 2018-01-01 PROCEDURE — 25010000002 MAGNESIUM SULFATE PER 500 MG OF MAGNESIUM: Performed by: NURSE PRACTITIONER

## 2018-01-01 PROCEDURE — 96413 CHEMO IV INFUSION 1 HR: CPT

## 2018-01-01 PROCEDURE — A9552 F18 FDG: HCPCS | Performed by: INTERNAL MEDICINE

## 2018-01-01 PROCEDURE — 78815 PET IMAGE W/CT SKULL-THIGH: CPT

## 2018-01-01 PROCEDURE — 83036 HEMOGLOBIN GLYCOSYLATED A1C: CPT | Performed by: PHYSICIAN ASSISTANT

## 2018-01-01 PROCEDURE — 88172 CYTP DX EVAL FNA 1ST EA SITE: CPT | Performed by: NURSE PRACTITIONER

## 2018-01-01 PROCEDURE — 36591 DRAW BLOOD OFF VENOUS DEVICE: CPT

## 2018-01-01 PROCEDURE — 25010000002 PEMETREXED 100 MG RECONSTITUTED SOLUTION 100 MG VIAL: Performed by: INTERNAL MEDICINE

## 2018-01-01 PROCEDURE — 25010000002 PALONOSETRON PER 25 MCG: Performed by: INTERNAL MEDICINE

## 2018-01-01 PROCEDURE — 93010 ELECTROCARDIOGRAM REPORT: CPT | Performed by: INTERNAL MEDICINE

## 2018-01-01 PROCEDURE — A9552 F18 FDG: HCPCS | Performed by: NURSE PRACTITIONER

## 2018-01-01 PROCEDURE — 71260 CT THORAX DX C+: CPT

## 2018-01-01 PROCEDURE — 36415 COLL VENOUS BLD VENIPUNCTURE: CPT

## 2018-01-01 PROCEDURE — 25010000002 IOPAMIDOL 61 % SOLUTION: Performed by: INTERNAL MEDICINE

## 2018-01-01 PROCEDURE — 82962 GLUCOSE BLOOD TEST: CPT

## 2018-01-01 PROCEDURE — 0 FLUDEOXYGLUCOSE F18 SOLUTION: Performed by: NURSE PRACTITIONER

## 2018-01-01 PROCEDURE — 85610 PROTHROMBIN TIME: CPT | Performed by: PHYSICIAN ASSISTANT

## 2018-01-01 PROCEDURE — 25010000002 FOSAPREPITANT PER 1 MG: Performed by: INTERNAL MEDICINE

## 2018-01-01 PROCEDURE — 25010000002 ONDANSETRON PER 1 MG: Performed by: NURSE ANESTHETIST, CERTIFIED REGISTERED

## 2018-01-01 PROCEDURE — 96040: CPT | Performed by: GENETIC COUNSELOR, MS

## 2018-01-01 PROCEDURE — 96367 TX/PROPH/DG ADDL SEQ IV INF: CPT

## 2018-01-01 PROCEDURE — 84100 ASSAY OF PHOSPHORUS: CPT | Performed by: INTERNAL MEDICINE

## 2018-01-01 PROCEDURE — 84443 ASSAY THYROID STIM HORMONE: CPT | Performed by: INTERNAL MEDICINE

## 2018-01-01 PROCEDURE — 25010000002 PEMETREXED 100 MG RECONSTITUTED SOLUTION 500 MG VIAL: Performed by: NURSE PRACTITIONER

## 2018-01-01 PROCEDURE — 25010000002 CISPLATIN PER 50 MG: Performed by: INTERNAL MEDICINE

## 2018-01-01 PROCEDURE — 83735 ASSAY OF MAGNESIUM: CPT

## 2018-01-01 PROCEDURE — 25010000002 DENOSUMAB 120 MG/1.7ML SOLUTION: Performed by: INTERNAL MEDICINE

## 2018-01-01 PROCEDURE — 88112 CYTOPATH CELL ENHANCE TECH: CPT | Performed by: INTERNAL MEDICINE

## 2018-01-01 PROCEDURE — 25010000002 NEOSTIGMINE 10 MG/10ML SOLUTION: Performed by: NURSE ANESTHETIST, CERTIFIED REGISTERED

## 2018-01-01 PROCEDURE — 99205 OFFICE O/P NEW HI 60 MIN: CPT | Performed by: INTERNAL MEDICINE

## 2018-01-01 PROCEDURE — 77334 RADIATION TREATMENT AID(S): CPT | Performed by: RADIOLOGY

## 2018-01-01 PROCEDURE — 25010000002 CYANOCOBALAMIN PER 1000 MCG: Performed by: NURSE PRACTITIONER

## 2018-01-01 PROCEDURE — 25010000002 DENOSUMAB 120 MG/1.7ML SOLUTION: Performed by: NURSE PRACTITIONER

## 2018-01-01 PROCEDURE — 0 GADOBENATE DIMEGLUMINE 529 MG/ML SOLUTION: Performed by: INTERNAL MEDICINE

## 2018-01-01 PROCEDURE — 88173 CYTOPATH EVAL FNA REPORT: CPT | Performed by: NURSE PRACTITIONER

## 2018-01-01 PROCEDURE — 96375 TX/PRO/DX INJ NEW DRUG ADDON: CPT

## 2018-01-01 PROCEDURE — 96372 THER/PROPH/DIAG INJ SC/IM: CPT

## 2018-01-01 PROCEDURE — 96366 THER/PROPH/DIAG IV INF ADDON: CPT

## 2018-01-01 PROCEDURE — 82533 TOTAL CORTISOL: CPT | Performed by: INTERNAL MEDICINE

## 2018-01-01 PROCEDURE — C1788 PORT, INDWELLING, IMP: HCPCS | Performed by: THORACIC SURGERY (CARDIOTHORACIC VASCULAR SURGERY)

## 2018-01-01 PROCEDURE — 72157 MRI CHEST SPINE W/O & W/DYE: CPT

## 2018-01-01 PROCEDURE — 25010000002 CEFUROXIME: Performed by: PHYSICIAN ASSISTANT

## 2018-01-01 PROCEDURE — 25010000002 NIVOLUMAB 40 MG/4ML SOLUTION 24 ML VIAL: Performed by: NURSE PRACTITIONER

## 2018-01-01 PROCEDURE — 31623 DX BRONCHOSCOPE/BRUSH: CPT | Performed by: INTERNAL MEDICINE

## 2018-01-01 PROCEDURE — 25010000002 FENTANYL CITRATE (PF) 100 MCG/2ML SOLUTION

## 2018-01-01 PROCEDURE — 71045 X-RAY EXAM CHEST 1 VIEW: CPT

## 2018-01-01 PROCEDURE — 25010000002 PALONOSETRON PER 25 MCG: Performed by: NURSE PRACTITIONER

## 2018-01-01 PROCEDURE — 25010000002 NIVOLUMAB 240 MG/24ML SOLUTION 24 ML VIAL: Performed by: INTERNAL MEDICINE

## 2018-01-01 PROCEDURE — A9577 INJ MULTIHANCE: HCPCS | Performed by: NURSE PRACTITIONER

## 2018-01-01 PROCEDURE — 94760 N-INVAS EAR/PLS OXIMETRY 1: CPT

## 2018-01-01 PROCEDURE — 96374 THER/PROPH/DIAG INJ IV PUSH: CPT

## 2018-01-01 PROCEDURE — 80053 COMPREHEN METABOLIC PANEL: CPT | Performed by: INTERNAL MEDICINE

## 2018-01-01 PROCEDURE — 85025 COMPLETE CBC W/AUTO DIFF WBC: CPT | Performed by: INTERNAL MEDICINE

## 2018-01-01 PROCEDURE — 25010000002 CYANOCOBALAMIN PER 1000 MCG: Performed by: INTERNAL MEDICINE

## 2018-01-01 PROCEDURE — 77295 3-D RADIOTHERAPY PLAN: CPT | Performed by: RADIOLOGY

## 2018-01-01 PROCEDURE — A9577 INJ MULTIHANCE: HCPCS | Performed by: INTERNAL MEDICINE

## 2018-01-01 PROCEDURE — 99214 OFFICE O/P EST MOD 30 MIN: CPT | Performed by: INTERNAL MEDICINE

## 2018-01-01 PROCEDURE — 25010000002 DEXAMETHASONE PER 1 MG: Performed by: NURSE PRACTITIONER

## 2018-01-01 PROCEDURE — 25010000002 DEXAMETHASONE PER 1 MG: Performed by: INTERNAL MEDICINE

## 2018-01-01 PROCEDURE — 85049 AUTOMATED PLATELET COUNT: CPT | Performed by: NURSE PRACTITIONER

## 2018-01-01 PROCEDURE — 80048 BASIC METABOLIC PNL TOTAL CA: CPT | Performed by: PHYSICIAN ASSISTANT

## 2018-01-01 PROCEDURE — 99213 OFFICE O/P EST LOW 20 MIN: CPT | Performed by: INTERNAL MEDICINE

## 2018-01-01 PROCEDURE — 85730 THROMBOPLASTIN TIME PARTIAL: CPT | Performed by: PHYSICIAN ASSISTANT

## 2018-01-01 PROCEDURE — 84439 ASSAY OF FREE THYROXINE: CPT

## 2018-01-01 PROCEDURE — 99406 BEHAV CHNG SMOKING 3-10 MIN: CPT | Performed by: PHYSICIAN ASSISTANT

## 2018-01-01 PROCEDURE — 25010000002 PROPOFOL 10 MG/ML EMULSION: Performed by: NURSE ANESTHETIST, CERTIFIED REGISTERED

## 2018-01-01 PROCEDURE — 85027 COMPLETE CBC AUTOMATED: CPT | Performed by: INTERNAL MEDICINE

## 2018-01-01 PROCEDURE — 96368 THER/DIAG CONCURRENT INF: CPT

## 2018-01-01 PROCEDURE — 25010000002 CISPLATIN PER 50 MG: Performed by: NURSE PRACTITIONER

## 2018-01-01 PROCEDURE — 77290 THER RAD SIMULAJ FIELD CPLX: CPT | Performed by: RADIOLOGY

## 2018-01-01 PROCEDURE — 78816 PET IMAGE W/CT FULL BODY: CPT

## 2018-01-01 PROCEDURE — 0 FLUDEOXYGLUCOSE F18 SOLUTION: Performed by: INTERNAL MEDICINE

## 2018-01-01 PROCEDURE — 77300 RADIATION THERAPY DOSE PLAN: CPT | Performed by: RADIOLOGY

## 2018-01-01 PROCEDURE — 83735 ASSAY OF MAGNESIUM: CPT | Performed by: INTERNAL MEDICINE

## 2018-01-01 PROCEDURE — 84443 ASSAY THYROID STIM HORMONE: CPT

## 2018-01-01 PROCEDURE — C1726 CATH, BAL DIL, NON-VASCULAR: HCPCS | Performed by: INTERNAL MEDICINE

## 2018-01-01 PROCEDURE — 25010000002 PEMETREXED 100 MG RECONSTITUTED SOLUTION 500 MG VIAL: Performed by: INTERNAL MEDICINE

## 2018-01-01 PROCEDURE — 99213 OFFICE O/P EST LOW 20 MIN: CPT | Performed by: PHYSICIAN ASSISTANT

## 2018-01-01 PROCEDURE — 85730 THROMBOPLASTIN TIME PARTIAL: CPT | Performed by: NURSE PRACTITIONER

## 2018-01-01 PROCEDURE — 99213 OFFICE O/P EST LOW 20 MIN: CPT | Performed by: NURSE PRACTITIONER

## 2018-01-01 PROCEDURE — 25010000002 POTASSIUM CHLORIDE PER 2 MEQ OF POTASSIUM: Performed by: INTERNAL MEDICINE

## 2018-01-01 PROCEDURE — 25010000002 PROPOFOL 10 MG/ML EMULSION: Performed by: ANESTHESIOLOGY

## 2018-01-01 PROCEDURE — 25010000002 DEXAMETHASONE PER 1 MG: Performed by: NURSE ANESTHETIST, CERTIFIED REGISTERED

## 2018-01-01 DEVICE — POWERPORT CLEARVUE IMPLANTABLE PORT WITH ATTACHABLE 8F POLYURETHANE OPEN-ENDED SINGLE-LUMEN VENOUS CATHETER INTERMEDIATE KIT
Type: IMPLANTABLE DEVICE | Site: CHEST | Status: FUNCTIONAL
Brand: POWERPORT CLEARVUE

## 2018-01-01 DEVICE — POWERPORT CLEARVUE ISP IMPLANTABLE PORT WITH ATTACHABLE 8 F CHRONOFLEX SILK OPEN-ENDED SINGLE-LUMEN VENOUS CATHETER
Type: IMPLANTABLE DEVICE | Site: CHEST | Status: FUNCTIONAL
Brand: POWERPORT CLEARVUE, CHRONOFLEX SILK

## 2018-01-01 RX ORDER — PROPOFOL 10 MG/ML
VIAL (ML) INTRAVENOUS AS NEEDED
Status: DISCONTINUED | OUTPATIENT
Start: 2018-01-01 | End: 2018-01-01 | Stop reason: SURG

## 2018-01-01 RX ORDER — FENTANYL CITRATE 50 UG/ML
INJECTION, SOLUTION INTRAMUSCULAR; INTRAVENOUS
Status: COMPLETED
Start: 2018-01-01 | End: 2018-01-01

## 2018-01-01 RX ORDER — NEOSTIGMINE METHYLSULFATE 1 MG/ML
INJECTION, SOLUTION INTRAVENOUS AS NEEDED
Status: DISCONTINUED | OUTPATIENT
Start: 2018-01-01 | End: 2018-01-01 | Stop reason: SURG

## 2018-01-01 RX ORDER — LIDOCAINE HYDROCHLORIDE 10 MG/ML
0.5 INJECTION, SOLUTION EPIDURAL; INFILTRATION; INTRACAUDAL; PERINEURAL ONCE AS NEEDED
Status: COMPLETED | OUTPATIENT
Start: 2018-01-01 | End: 2018-01-01

## 2018-01-01 RX ORDER — PROMETHAZINE HYDROCHLORIDE 25 MG/1
25 SUPPOSITORY RECTAL ONCE AS NEEDED
Status: DISCONTINUED | OUTPATIENT
Start: 2018-01-01 | End: 2018-01-01 | Stop reason: HOSPADM

## 2018-01-01 RX ORDER — LIDOCAINE HYDROCHLORIDE 10 MG/ML
INJECTION, SOLUTION EPIDURAL; INFILTRATION; INTRACAUDAL; PERINEURAL AS NEEDED
Status: DISCONTINUED | OUTPATIENT
Start: 2018-01-01 | End: 2018-01-01 | Stop reason: SURG

## 2018-01-01 RX ORDER — CYANOCOBALAMIN 1000 UG/ML
1000 INJECTION, SOLUTION INTRAMUSCULAR; SUBCUTANEOUS ONCE
Status: COMPLETED | OUTPATIENT
Start: 2018-01-01 | End: 2018-01-01

## 2018-01-01 RX ORDER — ATRACURIUM BESYLATE 10 MG/ML
INJECTION, SOLUTION INTRAVENOUS AS NEEDED
Status: DISCONTINUED | OUTPATIENT
Start: 2018-01-01 | End: 2018-01-01 | Stop reason: SURG

## 2018-01-01 RX ORDER — LIDOCAINE HYDROCHLORIDE 10 MG/ML
0.5 INJECTION, SOLUTION EPIDURAL; INFILTRATION; INTRACAUDAL; PERINEURAL ONCE AS NEEDED
Status: DISCONTINUED | OUTPATIENT
Start: 2018-01-01 | End: 2018-01-01 | Stop reason: HOSPADM

## 2018-01-01 RX ORDER — FENTANYL CITRATE 50 UG/ML
INJECTION, SOLUTION INTRAMUSCULAR; INTRAVENOUS AS NEEDED
Status: DISCONTINUED | OUTPATIENT
Start: 2018-01-01 | End: 2018-01-01 | Stop reason: SURG

## 2018-01-01 RX ORDER — ONDANSETRON 2 MG/ML
4 INJECTION INTRAMUSCULAR; INTRAVENOUS ONCE AS NEEDED
Status: COMPLETED | OUTPATIENT
Start: 2018-01-01 | End: 2018-01-01

## 2018-01-01 RX ORDER — SODIUM CHLORIDE 0.9 % (FLUSH) 0.9 %
10 SYRINGE (ML) INJECTION AS NEEDED
Status: DISCONTINUED | OUTPATIENT
Start: 2018-01-01 | End: 2018-01-01 | Stop reason: HOSPADM

## 2018-01-01 RX ORDER — CHLORHEXIDINE GLUCONATE 500 MG/1
1 CLOTH TOPICAL EVERY 12 HOURS PRN
Status: DISCONTINUED | OUTPATIENT
Start: 2018-01-01 | End: 2018-01-01 | Stop reason: HOSPADM

## 2018-01-01 RX ORDER — DEXAMETHASONE SODIUM PHOSPHATE 4 MG/ML
INJECTION, SOLUTION INTRA-ARTICULAR; INTRALESIONAL; INTRAMUSCULAR; INTRAVENOUS; SOFT TISSUE AS NEEDED
Status: DISCONTINUED | OUTPATIENT
Start: 2018-01-01 | End: 2018-01-01 | Stop reason: SURG

## 2018-01-01 RX ORDER — HYDROMORPHONE HCL 110MG/55ML
0.5 PATIENT CONTROLLED ANALGESIA SYRINGE INTRAVENOUS
Status: DISCONTINUED | OUTPATIENT
Start: 2018-01-01 | End: 2018-01-01 | Stop reason: HOSPADM

## 2018-01-01 RX ORDER — FENTANYL CITRATE 50 UG/ML
50 INJECTION, SOLUTION INTRAMUSCULAR; INTRAVENOUS
Status: DISCONTINUED | OUTPATIENT
Start: 2018-01-01 | End: 2018-01-01 | Stop reason: HOSPADM

## 2018-01-01 RX ORDER — SODIUM CHLORIDE 9 MG/ML
250 INJECTION, SOLUTION INTRAVENOUS ONCE
Status: COMPLETED | OUTPATIENT
Start: 2018-01-01 | End: 2018-01-01

## 2018-01-01 RX ORDER — SODIUM CHLORIDE 0.9 % (FLUSH) 0.9 %
3-10 SYRINGE (ML) INJECTION AS NEEDED
Status: DISCONTINUED | OUTPATIENT
Start: 2018-01-01 | End: 2018-01-01

## 2018-01-01 RX ORDER — MAGNESIUM HYDROXIDE 1200 MG/15ML
LIQUID ORAL AS NEEDED
Status: DISCONTINUED | OUTPATIENT
Start: 2018-01-01 | End: 2018-01-01 | Stop reason: HOSPADM

## 2018-01-01 RX ORDER — SODIUM CHLORIDE 0.9 % (FLUSH) 0.9 %
3 SYRINGE (ML) INJECTION EVERY 12 HOURS SCHEDULED
Status: DISCONTINUED | OUTPATIENT
Start: 2018-01-01 | End: 2018-01-01

## 2018-01-01 RX ORDER — SODIUM CHLORIDE 0.9 % (FLUSH) 0.9 %
1-10 SYRINGE (ML) INJECTION AS NEEDED
Status: DISCONTINUED | OUTPATIENT
Start: 2018-01-01 | End: 2018-01-01 | Stop reason: HOSPADM

## 2018-01-01 RX ORDER — PROPOFOL 10 MG/ML
VIAL (ML) INTRAVENOUS CONTINUOUS PRN
Status: DISCONTINUED | OUTPATIENT
Start: 2018-01-01 | End: 2018-01-01 | Stop reason: SURG

## 2018-01-01 RX ORDER — LABETALOL HYDROCHLORIDE 5 MG/ML
5 INJECTION, SOLUTION INTRAVENOUS
Status: DISCONTINUED | OUTPATIENT
Start: 2018-01-01 | End: 2018-01-01 | Stop reason: HOSPADM

## 2018-01-01 RX ORDER — LIDOCAINE HYDROCHLORIDE 10 MG/ML
INJECTION, SOLUTION INFILTRATION; PERINEURAL AS NEEDED
Status: DISCONTINUED | OUTPATIENT
Start: 2018-01-01 | End: 2018-01-01 | Stop reason: HOSPADM

## 2018-01-01 RX ORDER — SODIUM CHLORIDE 9 MG/ML
250 INJECTION, SOLUTION INTRAVENOUS ONCE
Status: CANCELLED | OUTPATIENT
Start: 2018-01-01

## 2018-01-01 RX ORDER — SODIUM CHLORIDE, SODIUM LACTATE, POTASSIUM CHLORIDE, CALCIUM CHLORIDE 600; 310; 30; 20 MG/100ML; MG/100ML; MG/100ML; MG/100ML
9 INJECTION, SOLUTION INTRAVENOUS CONTINUOUS
Status: DISCONTINUED | OUTPATIENT
Start: 2018-01-01 | End: 2018-01-01

## 2018-01-01 RX ORDER — FOLIC ACID 1 MG/1
1 TABLET ORAL DAILY
Qty: 30 TABLET | Refills: 4 | Status: SHIPPED | OUTPATIENT
Start: 2018-01-01 | End: 2018-01-01

## 2018-01-01 RX ORDER — PALONOSETRON 0.05 MG/ML
0.25 INJECTION, SOLUTION INTRAVENOUS ONCE
Status: CANCELLED | OUTPATIENT
Start: 2018-01-01

## 2018-01-01 RX ORDER — ASPIRIN 325 MG
325 TABLET ORAL EVERY 4 HOURS PRN
COMMUNITY
End: 2019-01-01

## 2018-01-01 RX ORDER — FAMOTIDINE 20 MG/1
20 TABLET, FILM COATED ORAL ONCE
Status: DISCONTINUED | OUTPATIENT
Start: 2018-01-01 | End: 2018-01-01

## 2018-01-01 RX ORDER — ONDANSETRON 2 MG/ML
4 INJECTION INTRAMUSCULAR; INTRAVENOUS ONCE AS NEEDED
Status: DISCONTINUED | OUTPATIENT
Start: 2018-01-01 | End: 2018-01-01 | Stop reason: HOSPADM

## 2018-01-01 RX ORDER — FENTANYL CITRATE 50 UG/ML
50 INJECTION, SOLUTION INTRAMUSCULAR; INTRAVENOUS
Status: COMPLETED | OUTPATIENT
Start: 2018-01-01 | End: 2018-01-01

## 2018-01-01 RX ORDER — FAMOTIDINE 10 MG/ML
20 INJECTION, SOLUTION INTRAVENOUS ONCE
Status: DISCONTINUED | OUTPATIENT
Start: 2018-01-01 | End: 2018-01-01

## 2018-01-01 RX ORDER — ONDANSETRON HYDROCHLORIDE 8 MG/1
8 TABLET, FILM COATED ORAL 3 TIMES DAILY PRN
Qty: 30 TABLET | Refills: 5 | Status: SHIPPED | OUTPATIENT
Start: 2018-01-01 | End: 2018-01-01

## 2018-01-01 RX ORDER — SODIUM CHLORIDE 0.9 % (FLUSH) 0.9 %
10 SYRINGE (ML) INJECTION AS NEEDED
Status: CANCELLED | OUTPATIENT
Start: 2018-01-01

## 2018-01-01 RX ORDER — PROMETHAZINE HYDROCHLORIDE 25 MG/ML
6.25 INJECTION, SOLUTION INTRAMUSCULAR; INTRAVENOUS ONCE AS NEEDED
Status: DISCONTINUED | OUTPATIENT
Start: 2018-01-01 | End: 2018-01-01 | Stop reason: HOSPADM

## 2018-01-01 RX ORDER — LIDOCAINE HYDROCHLORIDE AND EPINEPHRINE 10; 10 MG/ML; UG/ML
INJECTION, SOLUTION INFILTRATION; PERINEURAL AS NEEDED
Status: DISCONTINUED | OUTPATIENT
Start: 2018-01-01 | End: 2018-01-01 | Stop reason: HOSPADM

## 2018-01-01 RX ORDER — SODIUM CHLORIDE 0.9 % (FLUSH) 0.9 %
1-10 SYRINGE (ML) INJECTION AS NEEDED
Status: DISCONTINUED | OUTPATIENT
Start: 2018-01-01 | End: 2018-01-01

## 2018-01-01 RX ORDER — CYANOCOBALAMIN 1000 UG/ML
1000 INJECTION, SOLUTION INTRAMUSCULAR; SUBCUTANEOUS ONCE
Status: CANCELLED | OUTPATIENT
Start: 2018-01-01 | End: 2018-01-01

## 2018-01-01 RX ORDER — IPRATROPIUM BROMIDE AND ALBUTEROL SULFATE 2.5; .5 MG/3ML; MG/3ML
3 SOLUTION RESPIRATORY (INHALATION) ONCE
Status: COMPLETED | OUTPATIENT
Start: 2018-01-01 | End: 2018-01-01

## 2018-01-01 RX ORDER — HYDROMORPHONE HYDROCHLORIDE 1 MG/ML
0.5 INJECTION, SOLUTION INTRAMUSCULAR; INTRAVENOUS; SUBCUTANEOUS
Status: DISCONTINUED | OUTPATIENT
Start: 2018-01-01 | End: 2018-01-01 | Stop reason: HOSPADM

## 2018-01-01 RX ORDER — PROMETHAZINE HYDROCHLORIDE 25 MG/1
25 TABLET ORAL ONCE AS NEEDED
Status: DISCONTINUED | OUTPATIENT
Start: 2018-01-01 | End: 2018-01-01 | Stop reason: HOSPADM

## 2018-01-01 RX ORDER — FAMOTIDINE 20 MG/1
20 TABLET, FILM COATED ORAL ONCE
Status: COMPLETED | OUTPATIENT
Start: 2018-01-01 | End: 2018-01-01

## 2018-01-01 RX ORDER — PALONOSETRON 0.05 MG/ML
0.25 INJECTION, SOLUTION INTRAVENOUS ONCE
Status: COMPLETED | OUTPATIENT
Start: 2018-01-01 | End: 2018-01-01

## 2018-01-01 RX ORDER — LIDOCAINE HYDROCHLORIDE 10 MG/ML
20 INJECTION, SOLUTION INFILTRATION; PERINEURAL ONCE
Status: COMPLETED | OUTPATIENT
Start: 2018-01-01 | End: 2018-01-01

## 2018-01-01 RX ORDER — GLYCOPYRROLATE 0.2 MG/ML
INJECTION INTRAMUSCULAR; INTRAVENOUS AS NEEDED
Status: DISCONTINUED | OUTPATIENT
Start: 2018-01-01 | End: 2018-01-01 | Stop reason: SURG

## 2018-01-01 RX ORDER — SODIUM CHLORIDE, SODIUM LACTATE, POTASSIUM CHLORIDE, CALCIUM CHLORIDE 600; 310; 30; 20 MG/100ML; MG/100ML; MG/100ML; MG/100ML
9 INJECTION, SOLUTION INTRAVENOUS CONTINUOUS
Status: DISCONTINUED | OUTPATIENT
Start: 2018-01-01 | End: 2018-01-01 | Stop reason: HOSPADM

## 2018-01-01 RX ORDER — IPRATROPIUM BROMIDE AND ALBUTEROL SULFATE 2.5; .5 MG/3ML; MG/3ML
3 SOLUTION RESPIRATORY (INHALATION) ONCE AS NEEDED
Status: DISCONTINUED | OUTPATIENT
Start: 2018-01-01 | End: 2018-01-01 | Stop reason: HOSPADM

## 2018-01-01 RX ORDER — CHLORHEXIDINE GLUCONATE 500 MG/1
1 CLOTH TOPICAL EVERY 12 HOURS PRN
Status: CANCELLED | OUTPATIENT
Start: 2018-01-01

## 2018-01-01 RX ORDER — PANTOPRAZOLE SODIUM 40 MG/1
40 TABLET, DELAYED RELEASE ORAL DAILY
Refills: 0 | Status: ON HOLD | COMMUNITY
Start: 2018-01-01 | End: 2019-01-01 | Stop reason: SDUPTHER

## 2018-01-01 RX ORDER — LIDOCAINE AND PRILOCAINE 25; 25 MG/G; MG/G
CREAM TOPICAL AS NEEDED
Qty: 30 G | Refills: 3 | Status: SHIPPED | OUTPATIENT
Start: 2018-01-01 | End: 2018-01-01

## 2018-01-01 RX ORDER — DEXAMETHASONE 4 MG/1
TABLET ORAL
Qty: 6 TABLET | Refills: 2 | Status: SHIPPED | OUTPATIENT
Start: 2018-01-01 | End: 2018-01-01

## 2018-01-01 RX ORDER — LIDOCAINE HYDROCHLORIDE 10 MG/ML
0.5 INJECTION, SOLUTION EPIDURAL; INFILTRATION; INTRACAUDAL; PERINEURAL ONCE AS NEEDED
Status: DISCONTINUED | OUTPATIENT
Start: 2018-01-01 | End: 2018-01-01

## 2018-01-01 RX ORDER — PROCHLORPERAZINE MALEATE 10 MG
10 TABLET ORAL EVERY 6 HOURS PRN
Qty: 60 TABLET | Refills: 1 | Status: SHIPPED | OUTPATIENT
Start: 2018-01-01 | End: 2019-01-01

## 2018-01-01 RX ORDER — PANTOPRAZOLE SODIUM 40 MG/10ML
40 INJECTION, POWDER, LYOPHILIZED, FOR SOLUTION INTRAVENOUS ONCE
Status: COMPLETED | OUTPATIENT
Start: 2018-01-01 | End: 2018-01-01

## 2018-01-01 RX ORDER — FOLIC ACID 1 MG/1
TABLET ORAL
Qty: 30 TABLET | Refills: 4 | Status: SHIPPED | OUTPATIENT
Start: 2018-01-01

## 2018-01-01 RX ORDER — MIDAZOLAM HYDROCHLORIDE 1 MG/ML
INJECTION INTRAMUSCULAR; INTRAVENOUS AS NEEDED
Status: DISCONTINUED | OUTPATIENT
Start: 2018-01-01 | End: 2018-01-01 | Stop reason: SURG

## 2018-01-01 RX ORDER — HEPARIN SODIUM 1000 [USP'U]/ML
INJECTION, SOLUTION INTRAVENOUS; SUBCUTANEOUS AS NEEDED
Status: DISCONTINUED | OUTPATIENT
Start: 2018-01-01 | End: 2018-01-01 | Stop reason: HOSPADM

## 2018-01-01 RX ADMIN — FENTANYL CITRATE: 50 INJECTION, SOLUTION INTRAMUSCULAR; INTRAVENOUS at 11:14

## 2018-01-01 RX ADMIN — IOPAMIDOL 80 ML: 612 INJECTION, SOLUTION INTRAVENOUS at 09:00

## 2018-01-01 RX ADMIN — DEXAMETHASONE SODIUM PHOSPHATE 12 MG: 4 INJECTION, SOLUTION INTRAMUSCULAR; INTRAVENOUS at 13:14

## 2018-01-01 RX ADMIN — FLUDEOXYGLUCOSE F18 1 DOSE: 300 INJECTION INTRAVENOUS at 10:48

## 2018-01-01 RX ADMIN — LIDOCAINE HYDROCHLORIDE 60 MG: 10 INJECTION, SOLUTION EPIDURAL; INFILTRATION; INTRACAUDAL; PERINEURAL at 13:10

## 2018-01-01 RX ADMIN — SODIUM CHLORIDE, POTASSIUM CHLORIDE, SODIUM LACTATE AND CALCIUM CHLORIDE: 600; 310; 30; 20 INJECTION, SOLUTION INTRAVENOUS at 17:41

## 2018-01-01 RX ADMIN — PROPOFOL 150 MG: 10 INJECTION, EMULSION INTRAVENOUS at 10:29

## 2018-01-01 RX ADMIN — HEPARIN 500 UNITS: 100 SYRINGE at 17:20

## 2018-01-01 RX ADMIN — POTASSIUM CHLORIDE 1000 ML: 2 INJECTION, SOLUTION, CONCENTRATE INTRAVENOUS at 11:10

## 2018-01-01 RX ADMIN — SODIUM CHLORIDE 480 MG: 9 INJECTION, SOLUTION INTRAVENOUS at 14:25

## 2018-01-01 RX ADMIN — POTASSIUM CHLORIDE 1000 ML: 2 INJECTION, SOLUTION, CONCENTRATE INTRAVENOUS at 12:07

## 2018-01-01 RX ADMIN — CISPLATIN 132 MG: 1 INJECTION, SOLUTION INTRAVENOUS at 11:50

## 2018-01-01 RX ADMIN — CISPLATIN 132 MG: 1 INJECTION, SOLUTION INTRAVENOUS at 14:10

## 2018-01-01 RX ADMIN — HEPARIN 500 UNITS: 100 SYRINGE at 14:48

## 2018-01-01 RX ADMIN — SODIUM CHLORIDE, POTASSIUM CHLORIDE, SODIUM LACTATE AND CALCIUM CHLORIDE: 600; 310; 30; 20 INJECTION, SOLUTION INTRAVENOUS at 10:25

## 2018-01-01 RX ADMIN — DEXAMETHASONE SODIUM PHOSPHATE 12 MG: 4 INJECTION, SOLUTION INTRAMUSCULAR; INTRAVENOUS at 09:24

## 2018-01-01 RX ADMIN — DEXAMETHASONE SODIUM PHOSPHATE 12 MG: 4 INJECTION, SOLUTION INTRAMUSCULAR; INTRAVENOUS at 11:12

## 2018-01-01 RX ADMIN — ATRACURIUM BESYLATE 25 MG: 10 INJECTION, SOLUTION INTRAVENOUS at 10:29

## 2018-01-01 RX ADMIN — HEPARIN 500 UNITS: 100 SYRINGE at 15:07

## 2018-01-01 RX ADMIN — PROPOFOL 50 MCG/KG/MIN: 10 INJECTION, EMULSION INTRAVENOUS at 17:46

## 2018-01-01 RX ADMIN — CEFUROXIME 1.5 G: 1.5 INJECTION, POWDER, FOR SOLUTION INTRAVENOUS at 17:45

## 2018-01-01 RX ADMIN — SODIUM CHLORIDE, POTASSIUM CHLORIDE, SODIUM LACTATE AND CALCIUM CHLORIDE 9 ML/HR: 600; 310; 30; 20 INJECTION, SOLUTION INTRAVENOUS at 09:25

## 2018-01-01 RX ADMIN — GADOBENATE DIMEGLUMINE 12 ML: 529 INJECTION, SOLUTION INTRAVENOUS at 14:35

## 2018-01-01 RX ADMIN — SODIUM CHLORIDE 480 MG: 9 INJECTION, SOLUTION INTRAVENOUS at 12:00

## 2018-01-01 RX ADMIN — POTASSIUM CHLORIDE 1000 ML: 2 INJECTION, SOLUTION, CONCENTRATE INTRAVENOUS at 12:54

## 2018-01-01 RX ADMIN — POTASSIUM CHLORIDE 1000 ML: 2 INJECTION, SOLUTION, CONCENTRATE INTRAVENOUS at 14:57

## 2018-01-01 RX ADMIN — DENOSUMAB 120 MG: 120 INJECTION SUBCUTANEOUS at 15:15

## 2018-01-01 RX ADMIN — SODIUM CHLORIDE, POTASSIUM CHLORIDE, SODIUM LACTATE AND CALCIUM CHLORIDE: 600; 310; 30; 20 INJECTION, SOLUTION INTRAVENOUS at 11:45

## 2018-01-01 RX ADMIN — SODIUM CHLORIDE, POTASSIUM CHLORIDE, SODIUM LACTATE AND CALCIUM CHLORIDE 9 ML/HR: 600; 310; 30; 20 INJECTION, SOLUTION INTRAVENOUS at 16:35

## 2018-01-01 RX ADMIN — SODIUM CHLORIDE 150 MG: 9 INJECTION, SOLUTION INTRAVENOUS at 13:14

## 2018-01-01 RX ADMIN — SODIUM CHLORIDE 800 MG: 9 INJECTION, SOLUTION INTRAVENOUS at 13:35

## 2018-01-01 RX ADMIN — SODIUM CHLORIDE 250 ML: 9 INJECTION, SOLUTION INTRAVENOUS at 11:05

## 2018-01-01 RX ADMIN — FENTANYL CITRATE 100 MCG: 50 INJECTION, SOLUTION INTRAMUSCULAR; INTRAVENOUS at 13:10

## 2018-01-01 RX ADMIN — Medication 10 ML: at 15:07

## 2018-01-01 RX ADMIN — DENOSUMAB 120 MG: 120 INJECTION SUBCUTANEOUS at 12:01

## 2018-01-01 RX ADMIN — SODIUM CHLORIDE 800 MG: 9 INJECTION, SOLUTION INTRAVENOUS at 11:21

## 2018-01-01 RX ADMIN — SODIUM CHLORIDE 250 ML: 9 INJECTION, SOLUTION INTRAVENOUS at 13:13

## 2018-01-01 RX ADMIN — SODIUM CHLORIDE 150 MG: 9 INJECTION, SOLUTION INTRAVENOUS at 11:18

## 2018-01-01 RX ADMIN — IOPAMIDOL 100 ML: 612 INJECTION, SOLUTION INTRAVENOUS at 13:26

## 2018-01-01 RX ADMIN — FAMOTIDINE 20 MG: 20 TABLET ORAL at 16:35

## 2018-01-01 RX ADMIN — IPRATROPIUM BROMIDE AND ALBUTEROL SULFATE 3 ML: 2.5; .5 SOLUTION RESPIRATORY (INHALATION) at 11:58

## 2018-01-01 RX ADMIN — SODIUM CHLORIDE 250 ML: 9 INJECTION, SOLUTION INTRAVENOUS at 09:17

## 2018-01-01 RX ADMIN — ONDANSETRON 4 MG: 2 INJECTION INTRAMUSCULAR; INTRAVENOUS at 10:54

## 2018-01-01 RX ADMIN — CYANOCOBALAMIN 1000 MCG: 1000 INJECTION, SOLUTION INTRAMUSCULAR at 16:24

## 2018-01-01 RX ADMIN — FAMOTIDINE 20 MG: 20 TABLET ORAL at 11:48

## 2018-01-01 RX ADMIN — PALONOSETRON HYDROCHLORIDE 0.25 MG: 0.25 INJECTION INTRAVENOUS at 13:34

## 2018-01-01 RX ADMIN — CYANOCOBALAMIN 1000 MCG: 1000 INJECTION, SOLUTION INTRAMUSCULAR; SUBCUTANEOUS at 10:39

## 2018-01-01 RX ADMIN — POTASSIUM CHLORIDE 1000 ML: 2 INJECTION, SOLUTION, CONCENTRATE INTRAVENOUS at 15:21

## 2018-01-01 RX ADMIN — PALONOSETRON HYDROCHLORIDE 0.25 MG: 0.25 INJECTION INTRAVENOUS at 11:08

## 2018-01-01 RX ADMIN — MIDAZOLAM HYDROCHLORIDE 2 MG: 1 INJECTION, SOLUTION INTRAMUSCULAR; INTRAVENOUS at 17:45

## 2018-01-01 RX ADMIN — CEFUROXIME 1.5 G: 1.5 INJECTION, POWDER, FOR SOLUTION INTRAVENOUS at 13:15

## 2018-01-01 RX ADMIN — LIDOCAINE HYDROCHLORIDE 0.5 ML: 10 INJECTION, SOLUTION EPIDURAL; INFILTRATION; INTRACAUDAL; PERINEURAL at 16:35

## 2018-01-01 RX ADMIN — GLYCOPYRROLATE 0.2 MG: 0.2 INJECTION, SOLUTION INTRAMUSCULAR; INTRAVENOUS at 11:08

## 2018-01-01 RX ADMIN — GADOBENATE DIMEGLUMINE 12 ML: 529 INJECTION, SOLUTION INTRAVENOUS at 10:20

## 2018-01-01 RX ADMIN — Medication 10 ML: at 17:06

## 2018-01-01 RX ADMIN — SODIUM CHLORIDE 150 MG: 9 INJECTION, SOLUTION INTRAVENOUS at 09:26

## 2018-01-01 RX ADMIN — CISPLATIN 132 MG: 1 INJECTION, SOLUTION INTRAVENOUS at 13:51

## 2018-01-01 RX ADMIN — LIDOCAINE HYDROCHLORIDE 100 MG: 10 INJECTION, SOLUTION EPIDURAL; INFILTRATION; INTRACAUDAL; PERINEURAL at 10:29

## 2018-01-01 RX ADMIN — HEPARIN 500 UNITS: 100 SYRINGE at 17:06

## 2018-01-01 RX ADMIN — LIDOCAINE HYDROCHLORIDE 20 ML: 10 INJECTION, SOLUTION INFILTRATION; PERINEURAL at 11:10

## 2018-01-01 RX ADMIN — SODIUM CHLORIDE, POTASSIUM CHLORIDE, SODIUM LACTATE AND CALCIUM CHLORIDE 9 ML/HR: 600; 310; 30; 20 INJECTION, SOLUTION INTRAVENOUS at 11:43

## 2018-01-01 RX ADMIN — LIDOCAINE HYDROCHLORIDE 0.5 ML: 10 INJECTION, SOLUTION EPIDURAL; INFILTRATION; INTRACAUDAL; PERINEURAL at 11:43

## 2018-01-01 RX ADMIN — PROPOFOL 200 MCG/KG/MIN: 10 INJECTION, EMULSION INTRAVENOUS at 13:11

## 2018-01-01 RX ADMIN — SODIUM CHLORIDE 880 MG: 9 INJECTION, SOLUTION INTRAVENOUS at 11:48

## 2018-01-01 RX ADMIN — PANTOPRAZOLE SODIUM 40 MG: 40 INJECTION, POWDER, FOR SOLUTION INTRAVENOUS at 09:31

## 2018-01-01 RX ADMIN — DEXAMETHASONE SODIUM PHOSPHATE 8 MG: 4 INJECTION, SOLUTION INTRAMUSCULAR; INTRAVENOUS at 10:35

## 2018-01-01 RX ADMIN — PALONOSETRON HYDROCHLORIDE 0.25 MG: 0.25 INJECTION INTRAVENOUS at 09:18

## 2018-01-01 RX ADMIN — PROPOFOL 30 MG: 10 INJECTION, EMULSION INTRAVENOUS at 17:45

## 2018-01-01 RX ADMIN — POTASSIUM CHLORIDE 1000 ML: 2 INJECTION, SOLUTION, CONCENTRATE INTRAVENOUS at 09:46

## 2018-01-01 RX ADMIN — FLUDEOXYGLUCOSE F18 1 DOSE: 300 INJECTION INTRAVENOUS at 12:14

## 2018-01-01 RX ADMIN — NEOSTIGMINE METHYLSULFATE 3 MG: 1 INJECTION, SOLUTION INTRAVENOUS at 11:08

## 2018-01-10 ENCOUNTER — OFFICE VISIT (OUTPATIENT)
Dept: PULMONOLOGY | Facility: CLINIC | Age: 48
End: 2018-01-10

## 2018-01-10 VITALS
TEMPERATURE: 98.2 F | WEIGHT: 142 LBS | OXYGEN SATURATION: 92 % | BODY MASS INDEX: 24.24 KG/M2 | DIASTOLIC BLOOD PRESSURE: 80 MMHG | HEIGHT: 64 IN | HEART RATE: 84 BPM | SYSTOLIC BLOOD PRESSURE: 140 MMHG

## 2018-01-10 DIAGNOSIS — R91.1 LUNG NODULE: Primary | ICD-10-CM

## 2018-01-10 DIAGNOSIS — J44.9 CHRONIC OBSTRUCTIVE PULMONARY DISEASE, UNSPECIFIED COPD TYPE (HCC): ICD-10-CM

## 2018-01-10 PROCEDURE — 99214 OFFICE O/P EST MOD 30 MIN: CPT | Performed by: INTERNAL MEDICINE

## 2018-01-10 NOTE — PROGRESS NOTES
Subjective:     Chief Complaint:   Chief Complaint   Patient presents with   • Cough     Had CT Scan on 12/27/2017         HPI:    Moriah Qiu is a 48 y.o. female here for follow-up of Her recent CAT scan.  She has a history of stage I adenocarcinoma of the lung and underlying mild COPD.  She underwent right lower lobe lobectomy in September 2016.  Her tumor was a stage IA.  She underwent a follow-up CAT scan at the end of December this was compared to a study of last May.  A bilateral fibronodular process was noted in the apical regions bilaterally of unlikely significance.  There was a 9 mm nodule in the right upper lobe with a spiculated appearance that was about 4 mm at the time of her last scan in May.  All the other findings were stable.    I asked her to come in to discuss these results.  From a standpoint she notes no new symptoms either from a chest or systemic standpoint.  Specifically, she notes no weight loss, fevers, or chills.  She notes no increased cough, wheezing, sputum production, hemoptysis, or chest pain.  She continues to smoke about a pack and a half of cigarettes per day.    Current medications are:   Current Outpatient Prescriptions:   •  alendronate (FOSAMAX) 70 MG tablet, Take 70 mg by mouth every 7 days. EVERY SUNDAY, Disp: , Rfl:   •  cetirizine (zyrTEC) 10 MG tablet, Take 10 mg by mouth Daily., Disp: , Rfl:   •  Cholecalciferol (VITAMIN D3) 1000 UNITS capsule, Take 1 capsule by mouth daily., Disp: , Rfl:   •  famotidine (PEPCID) 40 MG tablet, Take 40 mg by mouth daily., Disp: , Rfl: 0  •  fluticasone (FLONASE) 50 MCG/ACT nasal spray, , Disp: , Rfl: 1  •  HYDROcodone-acetaminophen (NORCO)  MG per tablet, Take 1 tablet by mouth every 6 (six) hours as needed for severe pain (7-10)., Disp: , Rfl:   •  montelukast (SINGULAIR) 10 MG tablet, Daily., Disp: , Rfl: 1  •  ondansetron ODT (ZOFRAN-ODT) 4 MG disintegrating tablet, Take 1 tablet by mouth every 8 (eight) hours as needed (for  nausea)., Disp: 30 tablet, Rfl: 0  •  RA CALCIUM 600 600 MG tablet, Take 600 mg by mouth 2 (two) times a day with meals., Disp: , Rfl: 0  •  RA SALINE NASAL SPRAY 0.65 % nasal spray, , Disp: , Rfl: 1.      The patient's relevant past medical, surgical, family and social history were reviewed and updated in Epic as appropriate.     ROS:    Review of Systems   Constitutional: Negative for fever and unexpected weight change.   HENT: Positive for congestion.          Objective:    Physical Exam   Constitutional: She is oriented to person, place, and time. She appears well-developed and well-nourished.   HENT:   Head: Normocephalic and atraumatic.   Mouth/Throat: Oropharynx is clear and moist.   Neck: Neck supple. No thyromegaly present.   Cardiovascular: Normal rate and regular rhythm.  Exam reveals no gallop and no friction rub.    No murmur heard.  Pulmonary/Chest: Effort normal. No respiratory distress. She has no wheezes. She has no rales.   Musculoskeletal: She exhibits no edema.   Neurological: She is alert and oriented to person, place, and time.   Skin: Skin is warm and dry.   Psychiatric: She has a normal mood and affect. Her behavior is normal.   Vitals reviewed.      Diagnostics:     Reviewed CT scan images and reports with the patient.    Assessment:    Problem List Items Addressed This Visit        Pulmonary Problems    Lung nodule - Primary    Overview     RUL 9mm enlarging nodule 12/2017         Relevant Orders    NM Pet Skull Base To Mid Thigh    COPD (chronic obstructive pulmonary disease)          She has a history of recent non-small cell carcinoma and ongoing smoking.  A follow-up CT scan has revealed an enlarging right upper lobe nodule.  This has gone from 4 mm to 9 mm and is spiculated.    Her underlying COPD is mild in physiology and she would be a reasonable candidate for resection if needed.    Plan:     1. Proceed with PET scan  2. If hypermetabolic will refer for resection  3. If not  hypermetabolic will follow up a CT scan in 3 months and if continues to grow will need resection  4. Discussed the need for smoking cessation.  5. Discussed all the above with the patient in detail and answered all questions.  I showed her the images.  She understands the absolute need for close follow-up due to possibility of recurrent malignancy.    Discussed in detail with the patient.  She will call prior to her follow up visit for any new problems.    Signed by  Kingston Carrasquillo MD

## 2018-01-18 ENCOUNTER — HOSPITAL ENCOUNTER (OUTPATIENT)
Dept: PET IMAGING | Facility: HOSPITAL | Age: 48
Discharge: HOME | End: 2018-01-18
Attending: INTERNAL MEDICINE

## 2018-01-18 ENCOUNTER — HOSPITAL ENCOUNTER (OUTPATIENT)
Dept: PET IMAGING | Facility: HOSPITAL | Age: 48
Discharge: HOME | End: 2018-01-18
Attending: INTERNAL MEDICINE | Admitting: INTERNAL MEDICINE

## 2018-01-18 DIAGNOSIS — R91.1 LUNG NODULE: ICD-10-CM

## 2018-01-18 LAB — GLUCOSE BLDC GLUCOMTR-MCNC: 76 MG/DL (ref 70–130)

## 2018-01-18 PROCEDURE — A9552 F18 FDG: HCPCS | Performed by: INTERNAL MEDICINE

## 2018-01-18 PROCEDURE — 0 FLUDEOXYGLUCOSE F18 SOLUTION: Performed by: INTERNAL MEDICINE

## 2018-01-18 PROCEDURE — 82962 GLUCOSE BLOOD TEST: CPT

## 2018-01-18 PROCEDURE — 78815 PET IMAGE W/CT SKULL-THIGH: CPT

## 2018-01-18 RX ADMIN — FLUDEOXYGLUCOSE F18 1 DOSE: 300 INJECTION INTRAVENOUS at 12:43

## 2018-01-19 ENCOUNTER — DOCUMENTATION (OUTPATIENT)
Dept: PULMONOLOGY | Facility: CLINIC | Age: 48
End: 2018-01-19

## 2018-01-19 DIAGNOSIS — C34.91 ADENOCARCINOMA OF RIGHT LUNG, STAGE 1 (HCC): Primary | ICD-10-CM

## 2018-01-19 NOTE — PROGRESS NOTES
Reviewed patient's PET scan performed on 1/18/18.  This revealed that the spiculated nodule in question in the right upper lobe on the recent CAT scan does not demonstrate increased metabolic activity.  There was some increased activity in an anterior mediastinal node and a middle mediastinal node as well as a small pleural-based nodule in the right lower lobe.  All of these are small and showed equivocal amount of uptake.    I discussed the results with the patient via telephone today.  My recommendation was a follow-up CAT scan and I will have this performed prior to her follow-up visit with me in 6 months.  She understands the need for continued follow-up as the results of the PET scan is encouraging but we could still be dealing with undiagnosed malignancy but at this point there is not enough evidence to move to any invasive procedures.    Kingston Carrasquillo MD

## 2018-05-16 NOTE — PROGRESS NOTES
Moriah Qiu, a 48-year-old female, was referred for genetic counseling due to a personal history of cancer. Ms. Qiu reports she was diagnosed with uterine cancer at 21, ovarian cancer at 26, lymphoma at 25, and lung cancer at 46. Her current cancer screening includes annual mammogram and colonoscopy every two years. She reports that she had more than twelve polyps removed during her first colonoscopy at age 30. She reports that she has not had any polyps removed since that time. She was interested in discussing her risk for a hereditary cancer syndrome.  Ms. Qiu was interested in pursuing a multi-gene panel to evaluate her risk of cancer, therefore the CancerNext panel was ordered through [facility] which analyzes 34 genes associated with an increased cancer risk. The genes on this panel include APC, KIMMY, BARD1, BMPR1A, BRCA1, BRCA2, BRIP1, CDH1, CDK4, CDKN2A, CHEK2, DICER1, EPCAM, GREM1, HOXB13, MLH1, MRE11A, MSH2, MSH6, MUTYH, NBN, NF1, PALB2, PMS2, POLD1, POLE, PTEN, RAD50, RAD51C, RAD51D, SMAD4, SMARCA4, STK11, and TP53. Results are expected in 2-3 weeks.     PERTINENT FAMILY HISTORY: (See attached pedigree)   Mother:  Ovarian cancer, 70  Mat. Aunt:  Colon cancer, 40  Mat. Aunt:  Breast cancer, 38; Ovarian cancer, 58  Mat. Grandmother: Breast cancer, 60  Pat. Aunt:  Lung cancer  Pat. Uncle:  Lung cancer  Pat. Grandfather: Lung cancer    We do not have medical records regarding any of these diagnoses.      RISK ASSESSMENT:  Ms. Qiu’s personal history of early onset cancer, as well as her family history raises the question of a hereditary cancer syndrome. We discussed BRCA1/2 testing as well as the option of pursuing a panel that would test for other genes known to impact cancer risk in addition to BRCA1/2.   Ms. Qiu clearly meets NCCN guidelines criteria for BRCA1/2 testing based on her personal history of ovarian cancer. These risk assessments are based on the family history information provided at  the time of the appointment.  The assessments could change in the future should new information be obtained.    GENETIC COUNSELING (30 minutes):  We reviewed the family history information in detail. Cases of cancer follow three general patterns: sporadic, familial, and hereditary.  While most cancer is sporadic, some cases appear to occur in family clusters.  These cases are said to be familial and account for 10-20% of cancer cases.  Familial cases may be due to a combination of shared genes and environmental factors among family members.  In even fewer families, the cancer is said to be inherited, and the genes responsible for the cancer are known.      Family histories typical of hereditary cancer syndromes usually include multiple first- and second-degree relatives diagnosed with cancer types that define a syndrome.  These cases tend to be diagnosed at younger-than-expected ages and can be bilateral or multifocal.  The cancer in these families follows an autosomal dominant inheritance pattern, which indicates the likely presence of a mutation in a cancer susceptibility gene.  Children and siblings of an individual believed to carry this mutation have a 50% chance of inheriting that mutation, thereby inheriting the increased risk to develop cancer.  These mutations can be passed down from the maternal or the paternal lineage.    Hereditary breast and ovarian cancer accounts for 5-10% of all cases of breast and ovarian cancer.  A significant proportion of hereditary breast and ovarian cancer can be attributed to mutations in the BRCA1 and BRCA2 genes.  Mutations in these genes confer an increased risk for breast cancer, ovarian cancer, male breast cancer, prostate cancer, and pancreatic cancer.  Women with a BRCA1 or BRCA2 mutation have up to an 87% lifetime risk of breast cancer and up to a 44% risk of ovarian cancer.     We also discussed Matias syndrome (caused by mutations in MLH1, MSH2, MSH6, PMS2, or EPCAM  genes) which increases the risk for colon cancer (up to 80%), endometrial cancer (up to 60%), as well as other cancers (ovarian, upper GI, brain, stomach). There are national management guidelines that have been established for individuals known to have Matias syndrome.      There are other genes that are known to be associated with an increased risk for cancer.  Some of these genes have well defined cancer risks and established management guidelines.  Other genes that can be tested for have been more recently described, and there may be less data regarding the risks and therefore may not have established management guidelines. We discussed these limitations at length.  Based on Ms. Qiu’s desire to get as much information as possible regarding her personal risks and potential risks for her family, she opted to pursue testing through a panel that would look at several other genes known to increase the risk for cancer.    GENETIC TESTING:  The risks, benefits and limitations of genetic testing and implications for clinical management following testing were reviewed.  DNA test results can influence decisions regarding screening, prevention and surgical management.  Genetic testing can have significant psychological implications for both individuals and families.  Also discussed was the possibility of employment and insurance discrimination based on genetic test results and the laws in place to prevent this.    We discussed panel testing, which would involve testing for BRCA1/2, Matias syndrome genes, as well as 25 additional genes that are associated with increased cancer risk. The benefits and limitations of genetic testing were discussed and Ms. Qiu decided to pursue testing via the panel. The implications of a positive or negative test result were discussed. We discussed the possibility that, in some cases, genetic test results may be informative or may be ambiguous due to the identification of a genetic variant.  These variants may or may not be associated with an increased cancer risk.  With multigene panel testing, it is not uncommon for a variant of uncertain significance (VUS) to be identified.  If a VUS is identified, testing family members is typically not recommended and screening recommendations are made based on the family history.  The laboratories that perform genetic testing work to reclassify the VUS and send out an amended report if and when a VUS is reclassified.  The majority of variant findings are ultimately reclassified to a negative result.  Given her personal and family history, a negative test result would not eliminate all cancer risk to her relatives, although the risk would not be as high as it would with positive genetic testing.      PLAN: Genetic testing via the CancerNext panel through [facility] was ordered and results are expected in 2-3 weeks.  Ms. Qiu is welcome to contact us in the meantime at [phone] with any questions she may have.      Trixie Fry MS, [facility] [place]  Licensed Certified Genetic Counselor

## 2018-06-01 NOTE — PROGRESS NOTES
Subjective:     Chief Complaint:   Chief Complaint   Patient presents with   • COPD       HPI:    Moriah Qiu is a 48 y.o. female here for follow-up of Abnormal CAT scan and history of stage I adenocarcinoma of the lung.    She underwent right lower lobe lobectomy in September 2016.  Her tumor was a stage IA adenocarcinoma.  She has had some marginal CT abnormalities that we have been following.  These primarily were fibronodular changes in the apical regions bilaterally that was of unlikely clinical significance.  However, last November there was a 9 mm nodule in the right upper lobe with a spiculated appearance that was larger than the prior scan in May.  She underwent a PET scan which revealed no increased uptake in the nodule in question but there was some slight uptake in a nodule in the right lower lung field and in some mediastinal lymph nodes.  Plans were to continue to follow via CAT scan.    She now returns after a CAT scan on 5/25.  The reports are difficult to understand.  It seems as if the nodules are stable.  To my eye the previous enlarging right upper lobe nodule has actually decreased in size again.  There is some question of adenopathy in the right hilum.  This was not compared to CAT scans of last year for some reason.  I think these areas are slightly more prominent.    From our standpoint she continues to be minimally symptomatic.  She notes a mild cough but no wind increasing cough or sputum production.  She notes no fevers, chills, chest pain, weight loss.  She continues to smoke but has been trying to quit.    Current medications are:   Current Outpatient Prescriptions:   •  alendronate (FOSAMAX) 70 MG tablet, Take 70 mg by mouth every 7 days. EVERY SUNDAY, Disp: , Rfl:   •  cetirizine (zyrTEC) 10 MG tablet, Take 10 mg by mouth Daily., Disp: , Rfl:   •  Cholecalciferol (VITAMIN D3) 1000 UNITS capsule, Take 1 capsule by mouth daily., Disp: , Rfl:   •  famotidine (PEPCID) 40 MG tablet, Take  40 mg by mouth daily., Disp: , Rfl: 0  •  fluticasone (FLONASE) 50 MCG/ACT nasal spray, , Disp: , Rfl: 1  •  HYDROcodone-acetaminophen (NORCO)  MG per tablet, Take 1 tablet by mouth every 6 (six) hours as needed for severe pain (7-10)., Disp: , Rfl:   •  montelukast (SINGULAIR) 10 MG tablet, Daily., Disp: , Rfl: 1  •  ondansetron ODT (ZOFRAN-ODT) 4 MG disintegrating tablet, Take 1 tablet by mouth every 8 (eight) hours as needed (for nausea)., Disp: 30 tablet, Rfl: 0  •  RA CALCIUM 600 600 MG tablet, Take 600 mg by mouth 2 (two) times a day with meals., Disp: , Rfl: 0  •  RA SALINE NASAL SPRAY 0.65 % nasal spray, , Disp: , Rfl: 1.      The patient's relevant past medical, surgical, family and social history were reviewed and updated in Epic as appropriate.     ROS:    Review of Systems   Constitutional: Negative for unexpected weight change.   Respiratory: Negative for cough and shortness of breath.    Cardiovascular: Negative for chest pain.         Objective:    Physical Exam   Constitutional: She is oriented to person, place, and time. She appears well-developed and well-nourished.   HENT:   Head: Normocephalic and atraumatic.   Mouth/Throat: Oropharynx is clear and moist.   Neck: Neck supple. No thyromegaly present.   Cardiovascular: Normal rate and regular rhythm.  Exam reveals no gallop and no friction rub.    No murmur heard.  Pulmonary/Chest: Effort normal. No respiratory distress. She has no wheezes. She has no rales.   Musculoskeletal: She exhibits no edema.   Neurological: She is alert and oriented to person, place, and time.   Skin: Skin is warm and dry.   Psychiatric: She has a normal mood and affect. Her behavior is normal.   Vitals reviewed.      Diagnostics:     Reviewed CT images and report as described above.    Assessment:    Problem List Items Addressed This Visit        Pulmonary Problems    Lung nodule - Primary    Overview     1. RUL 9mm nodule 12/2017  2. Right hilar adenopathy          Relevant Orders    CT Chest With Contrast    COPD (chronic obstructive pulmonary disease)    Adenocarcinoma of right lung, lower lobe, stage 1    Overview     1.S/P Bronchoscopy with Right VATS, Right lower lobectomy, Mediastinal lymph node dissection, Intercostal nerve blocks 9/15/16 stage 1A         Relevant Orders    CT Chest With Contrast       Other    Tobacco use          48-year-old female with a history of stage IA adenocarcinoma and right lower lobe lobectomy in September 2016.  Her CAT scans and PET scans of the last year have been difficult to interpret.  To me the bottom line is that she has nodularity in the upper lobes which is likely not pathologic.  She does have some right hilar adenopathy and subcarinal fullness which is bothersome to me.  This does seem progressive but was not well appreciated by PET scan.  I think she warrants bronchoscopy with EBUS sampling of these areas and continued monitoring.    Plan:     1. I scheduled her for bronchoscopy with endobronchial ultrasound and fine-needle aspiration of the hilar and mediastinal lymph nodes next week.  2. Assuming that is negative she will have a follow-up CAT scan in 3 months for continued monitoring of the areas in question.  3. I discussed all the above with patient in detail.  She understands risks and benefits the procedure and is willing to proceed    Discussed in detail with the patient.  She will call prior to her follow up visit for any new problems.    Signed by  Kingston Carrasquillo MD

## 2018-06-04 NOTE — DISCHARGE INSTRUCTIONS

## 2018-06-05 NOTE — OP NOTE
BRONCHOSCOPY WITH ENDOBRONCHIAL ULTRASOUND  Progress Note    Moriah Qiu  6/5/2018    Pre-op Diagnosis:   Prior adenocarcinoma and new right hilar adenopathy       Post-Op Diagnosis Codes:   Same    Procedure/CPT® Codes:      Procedure(s):  BRONCHOSCOPY WITH ENDOBRONCHIAL ULTRASOUND WITH FLUORO    Surgeon(s):  Kingston Carrasquillo MD    Anesthesia: General    Staff:   Circulator: Zuly Palencia RN  Endo Technician: Kavita Dobbs    Estimated Blood Loss: 30 ml    Urine Voided: * No values recorded between 6/5/2018 10:26 AM and 6/5/2018 11:15 AM *    Specimens:                ID Type Source Tests Collected by Time   A : Right bronchus intermedius Brushing Bronchus NON-GYNECOLOGIC CYTOLOGY Kingston Carrasquillo MD 6/5/2018 1042   B : Lymph 7 Tissue Lymph Node TISSUE PATHOLOGY EXAM Kingston Carrasquillo MD 6/5/2018 1051   C : Lymph 11R Tissue Lymph Node TISSUE PATHOLOGY EXAM Kingston Carrasquillo MD 6/5/2018 1101         Drains:      Findings:       The procedure was performed under general anesthesia and the patient was intubated and anesthetized by the anesthesiologist.    The standard bronchoscope was passed via the endotracheal tube.  The tube was well-positioned in the upper trachea.  A complete inspection of the lower trachea and both endobronchial trees performed.  The anatomy was normal with the exception of a right lower lobe stump.  The stump was closed and there is a small amount of adherent mucus and some discoloration, possibly stitches, but no clear evidence of recurrent tumor.  Bronchial brushings were obtained in this area and sent for cytology.    Endobronchial ultrasound scope was then inserted.  A survey of levels 4, 7, 10, 11, and 12 lymph nodes were obtained.  There appeared to be adequate targets at level 7 and 11R.  Endobronchial ultrasound was utilized to perform transbronchial fine-needle aspirates ×3-4 passes at stations.  Some minor self-limited bleeding was encountered after  the aspirates.  This was suctioned clear.  The endobronchial ultrasound scope was removed and standard bronchoscope was reinserted to clear her airways of any residual blood.  There was no active bleeding.    Complications: No immediate.  The patient was turned back over to anesthesia for recovery.    No clear evidence of recurrent cancer though will await the results of the pathology and cytology and I will notify the patient when those are back and otherwise she will follow-up with me as previously ordered.      Kingston Carrasquillo MD     Date: 6/5/2018  Time: 11:21 AM

## 2018-06-05 NOTE — ANESTHESIA PREPROCEDURE EVALUATION
Anesthesia Evaluation                  Airway   Mallampati: II  TM distance: >3 FB  Neck ROM: full  no difficulty expected  Dental - normal exam   (+) lower dentures and upper dentures    Pulmonary - normal exam   (+) a smoker Current, lung cancer, COPD moderate, decreased breath sounds,   Cardiovascular - normal exam  Exercise tolerance: good (4-7 METS)        Neuro/Psych  GI/Hepatic/Renal/Endo    (+)  GERD,      Musculoskeletal     Abdominal  - normal exam    Bowel sounds: normal.   Substance History      OB/GYN          Other   (+) arthritis   history of cancer (lung; s/p RLL lobectomy)                    Anesthesia Plan    ASA 3     general     intravenous induction     Plan discussed with CRNA.

## 2018-06-05 NOTE — ANESTHESIA PROCEDURE NOTES
Airway  Urgency: elective    Date/Time: 6/5/2018 10:28 AM  End Time:6/5/2018 10:32 AM  Airway not difficult    General Information and Staff    Patient location during procedure: OR  Anesthesiologist: ROSA SWANN  CRNA: TAMMY ADAM    Indications and Patient Condition  Indications for airway management: airway protection    Preoxygenated: yes  MILS maintained throughout  Mask difficulty assessment: 1 - vent by mask    Final Airway Details  Final airway type: endotracheal airway      Successful airway: ETT  Cuffed: yes   Successful intubation technique: direct laryngoscopy  Endotracheal tube insertion site: oral  Blade: Karma  Blade size: #3  ETT size: 8.5 mm  Cormack-Lehane Classification: grade I - full view of glottis  Placement verified by: chest auscultation and capnometry   Inital cuff pressure (cm H2O): 22  Cuff volume (mL): 6  Measured from: lips  ETT to lips (cm): 20  Number of attempts at approach: 1    Additional Comments  Negative epigastric sounds, Breath sound equal bilaterally with symmetric chest rise and fall

## 2018-06-05 NOTE — DISCHARGE INSTRUCTIONS
Kingston Carrasquillo   [facility]   [address]  [place] [address]   P: [phone]   F: [phone]   Answer Service: [phone]      Should call within a few business days with results and follow-up. If not, please call.

## 2018-06-05 NOTE — ANESTHESIA POSTPROCEDURE EVALUATION
Patient: Moriah Qiu    Procedure Summary     Date:  06/05/18 Room / Location:   [facility] /  [facility]    Anesthesia Start:  1025 Anesthesia Stop:      Procedure:  BRONCHOSCOPY WITH ENDOBRONCHIAL ULTRASOUND WITH FLUORO (N/A Bronchus) Diagnosis:      Surgeon:  Kingston Carrasquillo MD Provider:  Sachin Giraldo MD    Anesthesia Type:  general ASA Status:  3          Anesthesia Type: general  Last vitals  BP   110/73 (06/05/18 0919)   Temp       Pulse   72 (06/05/18 0919)   Resp   16 (06/05/18 0919)     SpO2   92 % (06/05/18 0919)     Post Anesthesia Care and Evaluation    Patient location during evaluation: PACU  Patient participation: complete - patient participated  Level of consciousness: sleepy but conscious  Pain score: 0  Pain management: adequate  Airway patency: patent  Anesthetic complications: No anesthetic complications  PONV Status: none  Cardiovascular status: hemodynamically stable and acceptable  Respiratory status: nonlabored ventilation, acceptable and nasal cannula  Hydration status: acceptable    Comments: 112/83 97.3 70 coughing 96% HOB elevated

## 2018-06-07 NOTE — TELEPHONE ENCOUNTER
Called patient with medical oncology appointment. Notified her she would see Dr. Patterson 06/14/2018 with 1045 arrival time for 1115 appointment. Provided directions and contact information to clinic. Notified her she would be receiving a packet in the mail which includes a map. She v/u and agreeable to plan. Provided her with nurse navigator contact information. She knows to call with questions or concerns.

## 2018-06-07 NOTE — PROGRESS NOTES
Transbronchial needle aspirate of subcarinal lymph node station 7 reveals recurrence of her adenocarcinoma.  Have discussed this with the patient today via telephone and answered all questions.  I will arrange for an appointment as soon as possible with medical oncology.  If any problems or delays with appointment she knows how to contact me.    Kingston Carrasquillo MD

## 2018-06-11 NOTE — PROGRESS NOTES
Moriah Qiu, a 48-year-old female, was referred for genetic counseling due to a personal history of cancer. Ms. Qiu reports she was diagnosed with uterine cancer at 21, ovarian cancer at 26, lymphoma at 25, and lung cancer at 46. Her current cancer screening includes annual mammogram and colonoscopy every two years. She reports that she had more than twelve polyps removed during her first colonoscopy at age 30. She reports that she has not had any polyps removed since that time. Ms. Qiu is post-menopausal due to having her uterus and ovaries removed in her twenties following her cancer diagnoses.  She was interested in discussing her risk for a hereditary cancer syndrome. Ms. Qiu was interested in pursuing a multi-gene panel to evaluate her risk of cancer, therefore the CancerNext panel was ordered through [facility] which analyzes 34 genes associated with an increased cancer risk. The genes on this panel include APC, KIMMY, BARD1, BMPR1A, BRCA1, BRCA2, BRIP1, CDH1, CDK4, CDKN2A, CHEK2, DICER1, EPCAM, GREM1, HOXB13, MLH1, MRE11A, MSH2, MSH6, MUTYH, NBN, NF1, PALB2, PMS2, POLD1, POLE, PTEN, RAD50, RAD51C, RAD51D, SMAD4, SMARCA4, STK11, and TP53. Genetic testing was negative by sequencing and rearrangement testing for deleterious mutations in the 34 genes included on the CancerNext panel. These normal results were discussed with Ms. Qiu by telephone on 6/11/18.      PERTINENT FAMILY HISTORY: (See attached pedigree)   Mother:  Ovarian cancer, 70  Mat. Aunt:  Colon cancer, 40  Mat. Aunt:  Breast cancer, 38; Ovarian cancer, 58  Mat. Grandmother: Breast cancer, 60  Mat. Grandfather: Lung cancer  Pat. Aunt:  Lung cancer  Pat. Uncle:  Lung cancer  Pat. Grandfather: Lung cancer    We do not have medical records regarding any of these diagnoses.      RISK ASSESSMENT:  Ms. Qiu’s personal history of early onset cancer, as well as her family history raises the question of a hereditary cancer syndrome. We discussed  BRCA1/2 testing as well as the option of pursuing a panel that would test for other genes known to impact cancer risk in addition to BRCA1/2.   Ms. Qiu clearly meets NCCN guidelines criteria for BRCA1/2 testing based on her personal history of ovarian cancer and family history of breast and ovarian cancer.     At this time, Ms. Qiu’s lifetime risk of developing breast cancer should be assessed using family history risk assessment models. Using these models, Ms. Qiu’s breast cancer risk is estimated to be up to 6.6% (Ash), which is not elevated above the general population risk of 12.5%.  Per NCCN guidelines, a risk greater than 20% is considered high risk and warrants increased screening; Ms. Qiu does not fall into this category. These risk assessments are based on the family history information provided at the time of the appointment and could change in the future should new information be obtained.    GENETIC COUNSELING:  We reviewed the family history information in detail. Cases of cancer follow three general patterns: sporadic, familial, and hereditary.  While most cancer is sporadic, some cases appear to occur in family clusters.  These cases are said to be familial and account for 10-20% of cancer cases.  Familial cases may be due to a combination of shared genes and environmental factors among family members.  In even fewer families, the cancer is said to be inherited, and the genes responsible for the cancer are known.      Family histories typical of hereditary cancer syndromes usually include multiple first- and second-degree relatives diagnosed with cancer types that define a syndrome.  These cases tend to be diagnosed at younger-than-expected ages and can be bilateral or multifocal.  The cancer in these families follows an autosomal dominant inheritance pattern, which indicates the likely presence of a mutation in a cancer susceptibility gene.  Children and siblings of an individual  believed to carry this mutation have a 50% chance of inheriting that mutation, thereby inheriting the increased risk to develop cancer.  These mutations can be passed down from the maternal or the paternal lineage.    Hereditary breast and ovarian cancer accounts for 5-10% of all cases of breast and ovarian cancer.  A significant proportion of hereditary breast and ovarian cancer can be attributed to mutations in the BRCA1 and BRCA2 genes.  Mutations in these genes confer an increased risk for breast cancer, ovarian cancer, male breast cancer, prostate cancer, and pancreatic cancer.  Women with a BRCA1 or BRCA2 mutation have up to an 87% lifetime risk of breast cancer and up to a 44% risk of ovarian cancer.     We also discussed Matias syndrome (caused by mutations in MLH1, MSH2, MSH6, PMS2, or EPCAM genes) which increases the risk for colon cancer (up to 80%), endometrial cancer (up to 60%), as well as other cancers (ovarian, upper GI, brain, stomach). There are national management guidelines that have been established for individuals known to have Matias syndrome.      There are other genes that are known to be associated with an increased risk for cancer.  Some of these genes have well defined cancer risks and established management guidelines.  Other genes that can be tested for have been more recently described, and there may be less data regarding the risks and therefore may not have established management guidelines. We discussed these limitations at length.  Based on Ms. Qiu’s desire to get as much information as possible regarding her personal risks and potential risks for her family, she opted to pursue testing through a panel that would look at several other genes known to increase the risk for cancer.    GENETIC TESTING:  The risks, benefits and limitations of genetic testing and implications for clinical management following testing were reviewed.  DNA test results can influence decisions regarding  screening, prevention and surgical management.  Genetic testing can have significant psychological implications for both individuals and families.  Also discussed was the possibility of employment and insurance discrimination based on genetic test results and the laws in place to prevent this.    We discussed panel testing, which would involve testing for BRCA1/2, Matias syndrome genes, as well as 25 additional genes that are associated with increased cancer risk. The benefits and limitations of genetic testing were discussed and Ms. Qiu decided to pursue testing via the panel. The implications of a positive or negative test result were discussed. We discussed the possibility that, in some cases, genetic test results may be informative or may be ambiguous due to the identification of a genetic variant. These variants may or may not be associated with an increased cancer risk.  With multigene panel testing, it is not uncommon for a variant of uncertain significance (VUS) to be identified.  If a VUS is identified, testing family members is typically not recommended and screening recommendations are made based on the family history.  The laboratories that perform genetic testing work to reclassify the VUS and send out an amended report if and when a VUS is reclassified.  The majority of variant findings are ultimately reclassified to a negative result.  Given her personal and family history, a negative test result would not eliminate all cancer risk to her relatives, although the risk would not be as high as it would with positive genetic testing.      TEST RESULTS: Genetic testing was negative for known deleterious mutations by sequencing and rearrangement testing for the genes on the CancerNext panel (see attached results).  This negative result lowers, but does not eliminate, the risk of a hereditary cancer syndrome for Ms. Qiu. We are unable to determine why Ms. Qiu developed cancer at the time. This  assessment is based on the information provided at the time of the consultation.    CLINICAL MANAGEMENT GUIDELINES: Ms. Qiu’s management and surveillance should be determined by her oncology team. Options available to individuals with an elevated lifetime risk for breast cancer were briefly discussed, including increased surveillance, chemoprevention and prophylactic surgery (mastectomy).  Based on computer modeling, Ms. Qiu’s lifetime risk for breast cancer would not be considered “high risk”.  She should follow general population screening guidelines for her breast cancer risk, including annual clinical breast exam, annual mammography, and self-breast exam. Ms. Qiu reports she is currently having colonoscopies every two years, and she should continue to follow the recommendations of her gastroenterologist based on her personal clinical findings. Given her family history of colon cancer, per NCCN guidelines, individuals who have a second-degree relative diagnosed with colon cancer under age 50 should begin screening colonoscopy at age 50 and repeat every 5-10 years, or more frequently based on personal clinical findings.     Ms. Qiu’s close relatives should discuss their family history with their physicians to determine appropriate management given that Ms. Qiu reports a personal history of colon polyps at age 30. Ms. Qiu is encouraged to obtain records of pathology reports from her colonoscopies in order to inform her relatives. Per NCCN guidelines, if an individual has a first-degree relative with confirmed advanced (ie. high-grade dysplasia, 1cm or greater in size, villous or tubulovillous histology) adenoma, or pre-cancerous polyp, colonoscopy screening should begin at age 40 or the age of onset of adenoma in that relative, whichever is first. This is based on the information provided at today’s appointment, and recommendations could change should new information become available regarding the  family history.    PLAN: Genetic counseling remains available to Ms. Qiu and her family. We encourage Ms. Qiu to contact us if she has any questions or concerns.       Trixie Fry MS, [facility]  Licensed Certified Genetic Counselor       Cc: ASHER Petty

## 2018-06-14 NOTE — PROGRESS NOTES
CHIEF COMPLAINT: Management of recurrent adenocarcinoma of the lung    REASON FOR REFERRAL: Same      RECORDS OBTAINED  Records of the patients history including those obtained from  Dr. Carrasquillo were reviewed and summarized in detail.       Adenocarcinoma of right lung, lower lobe, stage 1    8/10/2016 Initial Diagnosis     Adenocarcinoma of right lung, lower lobe, stage 1A status post right lower lobectomy September 2016.  On serial CT had new right upper lobe abnormality that was spiculated but actually decreased in size on May 2018 CT but nonetheless underwent bronchoscopy which showed level VII node involvement on 6/5/18.  No clear-cut disease at the left lower lobe stump.  CBC and CMP unremarkable.  Saw me for the first time 6/14/18.  This was completely asymptomatic.  I recommended PET scan and MRI of the brain and then multidisciplinary conference collaboration to decide on chemotherapy/radiation versus resection though I doubt we would do so this far out from her original surgery.            HISTORY OF PRESENT ILLNESS:  The patient is a 48 y.o.  female, referred for Stage IA lung cancer now with level VII gagandeep recurrence.  See above for history of present illness    REVIEW OF SYSTEMS:  A 14 point review of systems was performed and is negative except as noted above.    Past Medical History:   Diagnosis Date   • Back pain    • COPD (chronic obstructive pulmonary disease)     emphysema   • Esophagus hernia    • Gall stone    • GERD (gastroesophageal reflux disease)    • History of stomach ulcers    • Lung cancer    • Osteoarthritis (arthritis due to wear and tear of joints)    • Osteoporosis    • Uterine cancer 1997    ovarian 1997   • Wears dentures      Past Surgical History:   Procedure Laterality Date   • BRONCHOSCOPY N/A 8/4/2016    Procedure: NAVIGATIONAL BRONCHOSCOPY;  Surgeon: Elder Tompkins MD;  Location: [facility] ENDOSCOPY;  Service:    • BRONCHOSCOPY N/A 8/4/2016    Procedure: BRONCHOSCOPY WITH  ENDOBRONCHIAL ULTRASOUND;  Surgeon: Elder Tompkins MD;  Location:  [facility] ENDOSCOPY;  Service:    • BRONCHOSCOPY N/A 6/5/2018    Procedure: BRONCHOSCOPY WITH ENDOBRONCHIAL ULTRASOUND WITH FLUORO;  Surgeon: Kingston Carrasquillo MD;  Location:  [facility] ENDOSCOPY;  Service: Pulmonary   • COLONOSCOPY      2016   • GALLBLADDER SURGERY  2016   • HYSTERECTOMY  1997   • THORACOSCOPY Right 9/15/2016    Procedure: BRONOSCOPY, RIGHT THORACOSCOPY VIDEO ASSISTED LOBECTOMY, MEDIASTINAL LYMPH NODE DISECTION;  Surgeon: Elder Ham MD;  Location:  [facility] OR;  Service:        Current Outpatient Prescriptions on File Prior to Visit   Medication Sig Dispense Refill   • cetirizine (zyrTEC) 10 MG tablet Take 10 mg by mouth Daily.     • Cholecalciferol (VITAMIN D3) 1000 UNITS capsule Take 1 capsule by mouth daily.     • famotidine (PEPCID) 40 MG tablet Take 40 mg by mouth daily.  0   • fluticasone (FLONASE) 50 MCG/ACT nasal spray 2 sprays into each nostril Daily.  1   • HYDROcodone-acetaminophen (NORCO)  MG per tablet Take 1 tablet by mouth every 6 (six) hours as needed for severe pain (7-10).     • montelukast (SINGULAIR) 10 MG tablet Take 10 mg by mouth Daily.  1   • ondansetron ODT (ZOFRAN-ODT) 4 MG disintegrating tablet Take 1 tablet by mouth every 8 (eight) hours as needed (for nausea). (Patient taking differently: Take 4 mg by mouth Every 8 (Eight) Hours As Needed for Nausea (for nausea).) 30 tablet 0   • RA CALCIUM 600 600 MG tablet Take 600 mg by mouth 2 (two) times a day with meals.  0   • RA SALINE NASAL SPRAY 0.65 % nasal spray 1 spray into each nostril Daily As Needed.  1   • [DISCONTINUED] alendronate (FOSAMAX) 70 MG tablet Take 70 mg by mouth every 7 days. EVERY SUNDAY       No current facility-administered medications on file prior to visit.        No Known Allergies    Social History     Social History   • Marital status:    • Number of children: 3     Occupational History   • NOT EMPLOYED       Previous  "Contractor for the [employer]     Social History Main Topics   • Smoking status: Current Every Day Smoker     Packs/day: 1.00     Years: 28.00     Types: Cigarettes   • Smokeless tobacco: Never Used   • Alcohol use No   • Drug use: No   • Sexual activity: Defer     Other Topics Concern   • Not on file       Family History   Problem Relation Age of Onset   • Cancer Mother    • Alzheimer's disease Father    • Osteoarthritis Other        PHYSICAL EXAM:    /71   Pulse 94   Temp 98 °F (36.7 °C)   Resp 20   Ht 170.2 cm (67\")   Wt 63.5 kg (140 lb)   BMI 21.93 kg/m²     ECOG: (0) Fully active, able to carry on all predisease performance without restriction  General: well appearing female in no acute distress  HEENT: sclera anicteric, oropharynx clear  Lymphatics: no cervical, supraclavicular, inguinal, or axillary adenopathy  Cardiovascular: regular rate and rhythm, no murmurs  Neck: Supple; No thyromegaly  Lungs: clear to auscultation bilaterally. No respiratory distress.   Abdomen: soft, nontender, nondistended.  No palpable organomegaly  Extremities: no cyanosis, clubbing, edema, or cords  Skin: no rashes, lesions, bruising, or petechiae  Neuro: Alert and oriented x 4; Moving all extremities.  Psych: No anxiety or depression    Admission on 06/05/2018, Discharged on 06/05/2018   Component Date Value Ref Range Status   • Case Report 06/05/2018    Final                    Value:Non-gynecologic Cytology                          Case: [id number]                                  Authorizing Provider:  Kingston Carrasquilol MD Collected:           06/05/2018 10:42 AM          Ordering Location:     [facility]   Received:            06/05/2018 11:29 AM                                 ENDO SUITES                                                                  Pathologist:           Obed Andrews MD                                                      Specimen:    Bronchus, Right bronchus " intermedius                                                      • Final Diagnosis 06/05/2018    Final                    Value:This result contains rich text formatting which cannot be displayed here.   • Case Report 06/05/2018    Final                    Value:Surgical Pathology Report                         Case: [id number]                                  Authorizing Provider:  Kingston Carrasquillo MD Collected:           06/05/2018 10:51 AM          Ordering Location:     [facility]   Received:            06/05/2018 11:31 AM                                 ENDO SUITES                                                                  Pathologist:           Eric Pickering MD                                                         Specimens:   1) - Lymph Node, Lymph 7                                                                            2) - Lymph Node, Lymph 11R                                                                • Clinical Information 06/05/2018    Final                    Value:This result contains rich text formatting which cannot be displayed here.   • Final Diagnosis 06/05/2018    Final                    Value:This result contains rich text formatting which cannot be displayed here.   • Comment 06/05/2018    Final                    Value:This result contains rich text formatting which cannot be displayed here.   • Gross Description 06/05/2018    Final                    Value:This result contains rich text formatting which cannot be displayed here.   • Microscopic Description 06/05/2018    Final                    Value:This result contains rich text formatting which cannot be displayed here.   Appointment on 06/04/2018   Component Date Value Ref Range Status   • WBC 06/04/2018 5.23  3.50 - 10.80 10*3/mm3 Final   • RBC 06/04/2018 4.26  3.89 - 5.14 10*6/mm3 Final   • Hemoglobin 06/04/2018 13.3  11.5 - 15.5 g/dL Final   • Hematocrit 06/04/2018 43.1  34.5 - 44.0 % Final   •  MCV 06/04/2018 101.2* 80.0 - 99.0 fL Final   • MCH 06/04/2018 31.2* 27.0 - 31.0 pg Final   • MCHC 06/04/2018 30.9* 32.0 - 36.0 g/dL Final   • RDW 06/04/2018 14.9* 11.3 - 14.5 % Final   • RDW-SD 06/04/2018 55.0* 37.0 - 54.0 fl Final   • MPV 06/04/2018 10.5  6.0 - 12.0 fL Final   • Platelets 06/04/2018 205  150 - 450 10*3/mm3 Final   • Glucose 06/04/2018 87  70 - 100 mg/dL Final   • BUN 06/04/2018 13  9 - 23 mg/dL Final   • Creatinine 06/04/2018 1.00  0.60 - 1.30 mg/dL Final   • Sodium 06/04/2018 145  132 - 146 mmol/L Final   • Potassium 06/04/2018 4.2  3.5 - 5.5 mmol/L Final   • Chloride 06/04/2018 116* 99 - 109 mmol/L Final   • CO2 06/04/2018 20.0  20.0 - 31.0 mmol/L Final   • Calcium 06/04/2018 8.1* 8.7 - 10.4 mg/dL Final   • Total Protein 06/04/2018 6.6  5.7 - 8.2 g/dL Final   • Albumin 06/04/2018 4.30  3.20 - 4.80 g/dL Final   • ALT (SGPT) 06/04/2018 12  7 - 40 U/L Final   • AST (SGOT) 06/04/2018 17  0 - 33 U/L Final   • Alkaline Phosphatase 06/04/2018 95  25 - 100 U/L Final   • Total Bilirubin 06/04/2018 0.0* 0.3 - 1.2 mg/dL Final   • eGFR Non African Amer 06/04/2018 59* >60 mL/min/1.73 Final   • Globulin 06/04/2018 2.3  gm/dL Final   • A/G Ratio 06/04/2018 1.9  1.5 - 2.5 g/dL Final   • BUN/Creatinine Ratio 06/04/2018 13.0  7.0 - 25.0 Final   • Anion Gap 06/04/2018 9.0  3.0 - 11.0 mmol/L Final       Assessment/Plan     1. Stage IA lung cancer adenocarcinoma  2. Now with level VII gagandeep recurrence  3. Continued smoking    Discussion: whether we treat her with cisplatin and Alimta plus radiation versus neoadjuvant therapy followed by resection versus primary resection depends first on whether or not she has distant disease and I'll check a PET and MRI of her brain.  If no distant metastases, I have asked our navigator to get her presented at our multidisciplinary conference to discuss the above options.  My suspicions are her best option is cisplatin Alimta plus radiation and follow that with Durvalumab.  I will  discuss this further with her when she returns to see us in a couple of weeks.  Discussed with the patient for one hour rate 50% spent counseling      Segundo Patterson MD    6/14/2018

## 2018-06-28 NOTE — PLAN OF CARE
Outpatient Infusion • [address] • [address] • [place] [id number] • [phone]      CHEMOTHERAPY EDUCATION SHEET    NAME:  Moriah Qiu      : 1970           DATE: 18    Booklets Given: Chemotherapy and You []  Eating Hints []    Sexuality/Fertility Books []     Chemotherapy/Biotherapy Education Sheets: (list all that apply)  Cisplatin and pemetrexed                                                                                                                                                                Chemotherapy Regimen:  Cisplatin and pemetrexed every 21 days    TOPICS EDUCATION PROVIDED EDUCATION REINFORCED COMMENTS   ANEMIA:  role of RBC, cause, s/s, ways to manage, role of transfusion [x] [] Discussed risk of anemia and encouraged to stay active.   THROMBOCYTOPENIA:  role of platelet, cause, s/s, ways to prevent bleeding, things to avoid, when to seek help [x] [] Discussed risk of bruising and bleeding.   NEUTROPENIA:  role of WBC, cause, infection precautions, s/s of infection, when to call MD [x] [] Discussed risk of infection and when to notify MD office; disucssed importance of hand washing.   NUTRITION & APPETITE CHANGES:  importance of maintaining healthy diet & weight, ways to manage to improve intake, dietary consult, exercise regimen [x] [] Discussed risk of decreased appetite.  Discussed pre and posthydration.   DIARRHEA:  causes, s/s of dehydration, ways to manage, dietary changes, when to call MD [x] [] Recommended OTC Immodium if needed.   CONSTIPATION:  causes, ways to manage, dietary changes, when to call MD [x] [] Recommended stool softner and Miralax if needed   NAUSEA & VOMITING:  cause, use of antiemetics, dietary changes, when to call MD [x] [] Discussed pre-meds and at home meds-when and how to take.   MOUTH SORES:  causes, oral care, ways to manage [x] [] Discussed preventive mouthwash with salt/baking soda/water and to notify MD office if sores  develop.  Discussed using a soft brissle toothbrush and flossing.   ALOPECIA:  cause, ways to manage, resources [] []    INFERTILITY & SEXUALITY:  causes, fertility preservation options, sexuality changes, ways to manage, importance of birth control [x] [] Discussed safe sex practices.   NERVOUS SYSTEM CHANGES:  causes, s/s, neuropathies, cognitive changes, ways to manage [] []    PAIN:  causes, ways to manage [] [] ????   SKIN & NAIL CHANGES:  cause, s/s, ways to manage [] []    ORGAN TOXICITIES:  cause, s/s, need for diagnostic tests, labs, when to notify MD [] []    SURVIVORSHIP:  distress, distress assessment, secondary malignancies, early/late effects, follow-up, social issues, social support [] []    HOME CARE:  use of spill kits, storing of PO chemo, how to manage bodily fluids [] []    MISCELLANEOUS:  drug interactions, administration, vesicant, et [x] [] Discussed length of infusion and frequency.  Discussed at home folic acid and the need for B12 shot.  Discussed how to take dex Days 2-4.     Referrals:        Notes:   Provided pt with my business card and advised to call with any questions/concerns.

## 2018-06-28 NOTE — PROGRESS NOTES
CHIEF COMPLAINT: Shekhar recurrence of history of stage I lung cancer status post right lower lobectomy September 2016 now with biopsy-proven level VII node that is under represented by her PET scan as outlined on testing results.    Problem List:     Adenocarcinoma of right lung, lower lobe, stage 1    8/10/2016 Initial Diagnosis     Adenocarcinoma of right lung, lower lobe, stage 1A status post right lower lobectomy September 2016.  On serial CT had new right upper lobe abnormality that was spiculated but actually decreased in size on May 2018 CT but nonetheless underwent bronchoscopy which showed level VII node involvement on 6/5/18.  No clear-cut disease at the left lower lobe stump.  CBC and CMP unremarkable.  Saw me for the first time 6/14/18.  This was completely asymptomatic.  I recommended PET scan and MRI of the brain.  Results are below with no extrathoracic metastasis.  I discussed the results of her 6/26/18 PET and MRI brain on 6/28/18 with Dr. Grey and he does not think that she would be eligible for resection.  Likewise, this known level VII node involvement on biopsy is under represented by the PET and we could not be certain that he had thoroughly treated this area.  Hence, he and I came to the mutual consensus that definitive chemotherapy and radiation would be her best bet at eradication though certainly not guarantee by any stretch.  She was subsequently educated on cisplatin and Alimta plus radiation and port will be placed by Dr. Ham.         6/26/2018 Imaging     Pet:  Impression:     1. Partially cavitary metabolic paramedian nodule posterior right lung  is concerning for second primary lesion or metastasis.  2. Hypermetabolic shekhar activity is stable nonenlarged lymph nodes. With  the inflammatory changes in the upper lobes these might be reactive but  followup should be considered.  3. Nonspecific tiny focus of hypermetabolic activity posterior to the  thyroid gland. Attention on  followup recommended     MRI brain negative for metastasis    CMP and CBC unremarkable save for alkaline phosphatase of 122            HISTORY OF PRESENT ILLNESS:  The patient is a 48 y.o. female, here for follow up on management of Shekhar recurrence of history of stage I lung cancer status post right lower lobectomy September 2016 now with biopsy-proven level VII node that is under represented by her PET scan as outlined on testing results.      Past Medical History:   Diagnosis Date   • Back pain    • COPD (chronic obstructive pulmonary disease)     emphysema   • Esophagus hernia    • Gall stone    • GERD (gastroesophageal reflux disease)    • History of stomach ulcers    • Lung cancer    • Osteoarthritis (arthritis due to wear and tear of joints)    • Osteoporosis    • Uterine cancer 1997    ovarian 1997   • Wears dentures      Past Surgical History:   Procedure Laterality Date   • BRONCHOSCOPY N/A 8/4/2016    Procedure: NAVIGATIONAL BRONCHOSCOPY;  Surgeon: Elder Tompkins MD;  Location:  [facility];  Service:    • BRONCHOSCOPY N/A 8/4/2016    Procedure: BRONCHOSCOPY WITH ENDOBRONCHIAL ULTRASOUND;  Surgeon: Elder Tompkins MD;  Location:  [facility];  Service:    • BRONCHOSCOPY N/A 6/5/2018    Procedure: BRONCHOSCOPY WITH ENDOBRONCHIAL ULTRASOUND WITH FLUORO;  Surgeon: Kingston Carrasquillo MD;  Location:  [facility];  Service: Pulmonary   • COLONOSCOPY      2016   • GALLBLADDER SURGERY  2016   • HYSTERECTOMY  1997   • THORACOSCOPY Right 9/15/2016    Procedure: BRONOSCOPY, RIGHT THORACOSCOPY VIDEO ASSISTED LOBECTOMY, MEDIASTINAL LYMPH NODE DISECTION;  Surgeon: Elder Ham MD;  Location:  [facility];  Service:        No Known Allergies    Family History and Social History reviewed and changed as necessary      REVIEW OF SYSTEM:   Review of Systems   Constitutional: Negative for appetite change, chills, diaphoresis, fatigue, fever and unexpected weight change.   HENT:   Negative for mouth sores,  "sore throat and trouble swallowing.    Eyes: Negative for icterus.   Respiratory: Negative for cough, hemoptysis and shortness of breath.    Cardiovascular: Negative for chest pain, leg swelling and palpitations.   Gastrointestinal: Negative for abdominal distention, abdominal pain, blood in stool, constipation, diarrhea, nausea and vomiting.   Endocrine: Negative for hot flashes.   Genitourinary: Negative for bladder incontinence, difficulty urinating, dysuria, frequency and hematuria.    Musculoskeletal: Negative for gait problem, neck pain and neck stiffness.   Skin: Negative for rash.   Neurological: Negative for dizziness, gait problem, headaches, light-headedness and numbness.   Hematological: Negative for adenopathy. Does not bruise/bleed easily.   Psychiatric/Behavioral: Negative for depression. The patient is not nervous/anxious.    All other systems reviewed and are negative.       PHYSICAL EXAM    Vitals:    06/28/18 1129   BP: 124/65   Pulse: 86   Resp: 20   Temp: 97.3 °F (36.3 °C)   TempSrc: Temporal Artery    Weight: 64.4 kg (142 lb)   Height: 170.2 cm (67\")     Constitutional: Appears well-developed and well-nourished. No distress.   ECOG: (0) Fully active, able to carry on all predisease performance without restriction  HENT:   Head: Normocephalic.   Mouth/Throat: Oropharynx is clear and moist.   Eyes: Conjunctivae are normal. Pupils are equal, round, and reactive to light. No scleral icterus.   Neck: Neck supple. No JVD present. No thyromegaly present.   Cardiovascular: Normal rate, regular rhythm and normal heart sounds.    Pulmonary/Chest: Breath sounds normal. No respiratory distress.   Abdominal: Soft. Exhibits no distension and no mass. There is no hepatosplenomegaly. There is no tenderness. There is no rebound and no guarding.   Musculoskeletal:Exhibits no edema, tenderness or deformity.   Neurological: Alert and oriented to person, place, and time. Exhibits normal muscle tone.   Skin: No " ecchymosis, no petechiae and no rash noted. Not diaphoretic. No cyanosis. Nails show no clubbing.   Psychiatric: Normal mood and affect.   Vitals reviewed.      Mri Brain With & Without Contrast    Result Date: 6/27/2018  No evidence of intracranial metastatic disease or other acute disease.  D:  06/26/2018 E:  06/27/2018    This report was finalized on 6/27/2018 9:33 PM by DR. Savage Graham MD.      Nm Pet Whole Body    Result Date: 6/27/2018  1. Partially cavitary metabolic paramedian nodule posterior right lung is concerning for second primary lesion or metastasis. 2. Hypermetabolic gagandeep activity is stable nonenlarged lymph nodes. With the inflammatory changes in the upper lobes these might be reactive but followup should be considered. 3. Nonspecific tiny focus of hypermetabolic activity posterior to the thyroid gland. Attention on followup recommended.  D:  06/26/2018 E:  06/27/2018  This report was finalized on 6/27/2018 11:21 AM by Pawan Cain.              ASSESSMENT & PLAN:    1.  History of stage I lung cancer  2. Now with level VII gagandeep recurrence of adenocarcinoma and PET scan showing no extrathoracic involvement and no evidence of multifocal metastases within the chest but with one cavitary lesion as outlined above that is concerning for malignancy  3. COPD  4. Current smoker    Discussion: I have educated her on the plan for cisplatin and Alimta plus radiation for definitive doses.  I discussed her with Dr. Grey and given the level VII node with lung disease and the relatively nature of this gagandeep recurrence, the decision between us was she would be best served by definitive doses of radiation with chemotherapy rather than considering resection.  We'll get her to Dr. Ham for port placement and we have educated her on the cisplatin and Alimta with informational sheets on each on over by our pharmacy staff as well as I went over the majority of that information.  We will get her referred to  radiation oncology and see her back on July 20 to see my nurse practitioner to get started.  Informed consent has been signed indicating our intent to attempt cure but she knows that this is far from a guarantee.  Discussed with patient and Dr. Carrasquillo 30 minutes greater than 50% spent in counseling.      Segundo Patterson MD    06/28/2018

## 2018-07-05 NOTE — PROGRESS NOTES
SW met with pt to provide support and resources.  Pt lives about three hours from [place].  She is  and has several sisters and children who are planning to participate in her care.  Pt is wanting to stay at [facility] during her treatments.  SW educated her about the requirements of [facility] and will put the referral in as soon as her dates are confirmed.  SW provided contact information to pt and will follow up to continue supporting her as needed.

## 2018-07-05 NOTE — PROGRESS NOTES
CONSULTATION NOTE    NAME:      Moriah Qiu  :                                                          1970  DATE OF CONSULTATION:                       2018   REQUESTING PHYSICIAN:                   Segundo Patterson MD  REASON FOR CONSULTATION:           recurrent lung cancer       BRIEF HISTORY:  Moriah Qiu  is a very pleasant 48 y.o. female  who underwent right lower lobectomy on 9/15/2016 for stage I A moderately differentiated adenocarcinoma of the right lung.  Tumor size was 1.7 cm with no pleural invasion, no lymphovascular invasion, the margins were clear and 0 of 22 nodes were positive.  She also has a history of uterine cancer in .  She underwent PET/CT on 2018 and it was found that the previously seen solitary hypermetabolic right pulmonary nodule had been resected.  However, there was recurrent disease seen on 2018 PET scan.  There was a 1.37 m nodule in the anterior mediastinum and small areas of adenopathy just anterior to the right mainstem bronchus and in subcarinal space.  There was also a small hypermetabolic nodule in the right lower lobe medially measuring 1 cm with maximum SUV of 2.39.  The small spiculated nodule noted on the CT scan of 2017 was not hypermetabolic.    MRI of the brain on 2018 was negative for metastatic disease repeat PET scan on 2018 showed in the medial segment of the middle lobe now occupying the posterior medial to the cavity was a pleural-based hypermetabolic lesion with some cavitation that had not changed since May 2018 that had increased in size.  The SUV max was 4.5.  There was a tiny focus of hypermetabolic activity posterior to the right thyroid lobe with maximum SUV of 3.7 and high right paratracheal gagandeep activity with maximum SUV of 4.7.  There is also right hilar gagandeep activity with SUV of 5.6 and subcarinal gagandeep activity of 3.4.  Bronchoscopy on 2018 showed adenocarcinoma of stations 7 and 11 right.  Genetic  testing was negative.     Dr. Patterson has seen the patient and recommended combined chemoradiotherapy of cisplatin and Alimta.  She was presented at tumor board and this was also the recommendation.  Today she complains of fatigue, cough, shortness of breath and wheezing.  She denies weight loss, fever, chills or night sweats.    No Known Allergies    Social History   Substance Use Topics   • Smoking status: Current Every Day Smoker     Packs/day: 1.00     Years: 28.00     Types: Cigarettes   • Smokeless tobacco: Never Used   • Alcohol use No     [place]  She continues to smoke 1 pack per day.    Past Medical History:   Diagnosis Date   • Back pain    • COPD (chronic obstructive pulmonary disease) (CMS/HCC)     emphysema   • Esophagus hernia    • Gall stone    • GERD (gastroesophageal reflux disease)    • History of stomach ulcers    • Lung cancer (CMS/HCC)    • Osteoarthritis (arthritis due to wear and tear of joints)    • Osteoporosis    • Uterine cancer (CMS/HCC) 1997    ovarian 1997   • Wears dentures        family history includes Alzheimer's disease in her father; Cancer in her mother; Osteoarthritis in her other.     Past Surgical History:   Procedure Laterality Date   • BRONCHOSCOPY N/A 8/4/2016    Procedure: NAVIGATIONAL BRONCHOSCOPY;  Surgeon: Elder Tompkins MD;  Location:  [facility];  Service:    • BRONCHOSCOPY N/A 8/4/2016    Procedure: BRONCHOSCOPY WITH ENDOBRONCHIAL ULTRASOUND;  Surgeon: Elder Tompkins MD;  Location:  [facility];  Service:    • BRONCHOSCOPY N/A 6/5/2018    Procedure: BRONCHOSCOPY WITH ENDOBRONCHIAL ULTRASOUND WITH FLUORO;  Surgeon: Kingston Carrasquillo MD;  Location:  [facility];  Service: Pulmonary   • COLONOSCOPY      2016   • GALLBLADDER SURGERY  2016   • HYSTERECTOMY  1997   • THORACOSCOPY Right 9/15/2016    Procedure: BRONOSCOPY, RIGHT THORACOSCOPY VIDEO ASSISTED LOBECTOMY, MEDIASTINAL LYMPH NODE DISECTION;   "Surgeon: Elder Ham MD;  Location: [facility];  Service:         Review of Systems   Constitutional: Positive for fatigue.   Respiratory: Positive for cough, shortness of breath and wheezing.    All other systems reviewed and are negative.          Objective   VITAL SIGNS:   Vitals:    07/05/18 1325   BP: 104/72   Pulse: 89   Resp: 16   Temp: 98.3 °F (36.8 °C)   Weight: 63.9 kg (140 lb 12.8 oz)   Height: 170.2 cm (67\")   PainSc: 0-No pain        KPS       90%    Physical Exam   Constitutional: She is oriented to person, place, and time. She appears well-developed and well-nourished.   Eyes: EOM are normal.   Cardiovascular: Normal rate, regular rhythm and normal heart sounds.    Pulmonary/Chest: Effort normal and breath sounds normal.   Abdominal: Soft.   Neurological: She is alert and oriented to person, place, and time. No cranial nerve deficit.   Nursing note and vitals reviewed.           The following portions of the patient's history were reviewed and updated as appropriate: allergies, current medications, past family history, past medical history, past social history, past surgical history and problem list.    Assessment      IMPRESSION: Recurrent lung adenocarcinoma of the lung    RECOMMENDATIONS:  Moriah Qiu is a 48 y.o. -year-old female who now presents with recurrent lung cancer.  I spent time with the patient discussing the overall prognosis of the cancer, the available options, and specifically the role of radiation therapy. The patient had several questions which I believe were answered to her satisfaction.  After providing a full explanation of all of the risks and side effects of treatment, as well as benefits, she has elected to proceed with radiation therapy Here at [facility].  She lives closer to [place], [place] but plans to stay at the [facility] during therapy.  We signed consent today and I coordinated for her to undergo a CT simulation and treatment planning session after her port is placed " so that we can avoid it with radiation.  She meets with Dr. Ham on July 10.  Once this is completed, treatment should be ready to begin within approximately one week. I will coordinate all additional appointments with you as we determine the treatment schedule.          Vi Lozano MD      Errors in dictation may reflect use of voice recognition software and not all errors in transcription may have been detected prior to signing.

## 2018-07-10 NOTE — PROGRESS NOTES
07/10/2018  Patient Information  Moriah Qiu                                                                                          [address]  [place] [address]   1970  'PCP/Referring Physician'  Darline Velasco MD  [phone]  Segundo Patterson MD  [phone]  Chief Complaint   Patient presents with   • Consult     referred back by Dr. Patterson for lung cancer        History of Present Illness: Mrs. Qiu is a 48 year old  female with a history of uterine cancer, active tobacco abuse and COPD who is well known to me status post right VATS lower lobectomy on 9/15/16 for a [id number] Stage IA adenocarcinoma.  She has unfortunately continued to smoke and now has recurrent disease on PET scan 1/18/18 that is present on station 7 lymph nodes biopsied by Dr. Carrasquillo via EBUS.  Today, the patient is without complaints.  She denies weight loss, hemoptysis or lymphadenopathy.  The patient has a chronic cough with smoking.        Patient Active Problem List   Diagnosis   • Lung nodule   • Osteoporosis   • Syringomyelia (CMS/HCC)   • Adenocarcinoma of right lung, lower lobe, stage 1   • Osteoarthritis (arthritis due to wear and tear of joints)   • COPD (chronic obstructive pulmonary disease) (CMS/HCC)   • Allergic rhinitis   • Tobacco use     Past Medical History:   Diagnosis Date   • Back pain    • COPD (chronic obstructive pulmonary disease) (CMS/HCC)     emphysema   • Esophagus hernia    • Gall stone    • GERD (gastroesophageal reflux disease)    • History of stomach ulcers    • Lung cancer (CMS/HCC)    • Osteoarthritis (arthritis due to wear and tear of joints)    • Osteoporosis    • Uterine cancer (CMS/HCC) 1997    ovarian 1997   • Wears dentures      Past Surgical History:   Procedure Laterality Date   • BRONCHOSCOPY N/A 8/4/2016    Procedure: NAVIGATIONAL BRONCHOSCOPY;  Surgeon: Elder Tompkins MD;  Location: [facility] ENDOSCOPY;  Service:    • BRONCHOSCOPY N/A 8/4/2016    Procedure: BRONCHOSCOPY  WITH ENDOBRONCHIAL ULTRASOUND;  Surgeon: Elder Tompkins MD;  Location:  [facility] ENDOSCOPY;  Service:    • BRONCHOSCOPY N/A 6/5/2018    Procedure: BRONCHOSCOPY WITH ENDOBRONCHIAL ULTRASOUND WITH FLUORO;  Surgeon: Kingston Carrasquillo MD;  Location:  [facility] ENDOSCOPY;  Service: Pulmonary   • COLONOSCOPY      2016   • GALLBLADDER SURGERY  2016   • HYSTERECTOMY  1997   • THORACOSCOPY Right 9/15/2016    Procedure: BRONOSCOPY, RIGHT THORACOSCOPY VIDEO ASSISTED LOBECTOMY, MEDIASTINAL LYMPH NODE DISECTION;  Surgeon: Elder Ham MD;  Location:  [facility] OR;  Service:        Current Outpatient Prescriptions:   •  cetirizine (zyrTEC) 10 MG tablet, Take 10 mg by mouth Daily., Disp: , Rfl:   •  Cholecalciferol (VITAMIN D3) 1000 UNITS capsule, Take 1 capsule by mouth daily., Disp: , Rfl:   •  dexamethasone (DECADRON) 4 MG tablet, Take 2 tablets in the morning daily on Days 2,3 and 4.  Take with food., Disp: 6 tablet, Rfl: 2  •  famotidine (PEPCID) 40 MG tablet, Take 40 mg by mouth daily., Disp: , Rfl: 0  •  fluticasone (FLONASE) 50 MCG/ACT nasal spray, 2 sprays into each nostril Daily., Disp: , Rfl: 1  •  folic acid (FOLVITE) 1 MG tablet, Take 1 tablet by mouth Daily. Start at least 7 days prior to chemotherapy until at least 3 weeks after all chemotherapy., Disp: 30 tablet, Rfl: 4  •  HYDROcodone-acetaminophen (NORCO)  MG per tablet, Take 1 tablet by mouth every 6 (six) hours as needed for severe pain (7-10)., Disp: , Rfl:   •  lidocaine-prilocaine (EMLA) 2.5-2.5 % cream, Apply  topically As Needed (prior to port access)., Disp: 30 g, Rfl: 3  •  montelukast (SINGULAIR) 10 MG tablet, Take 10 mg by mouth Daily., Disp: , Rfl: 1  •  ondansetron (ZOFRAN) 8 MG tablet, Take 1 tablet by mouth 3 (Three) Times a Day As Needed for Nausea or Vomiting., Disp: 30 tablet, Rfl: 5  •  ondansetron ODT (ZOFRAN-ODT) 4 MG disintegrating tablet, Take 1 tablet by mouth every 8 (eight) hours as needed (for nausea). (Patient taking differently:  Take 4 mg by mouth Every 8 (Eight) Hours As Needed for Nausea (for nausea).), Disp: 30 tablet, Rfl: 0  •  RA CALCIUM 600 600 MG tablet, Take 600 mg by mouth 2 (two) times a day with meals., Disp: , Rfl: 0  •  RA SALINE NASAL SPRAY 0.65 % nasal spray, 1 spray into each nostril Daily As Needed., Disp: , Rfl: 1  No Known Allergies  Social History     Social History   • Marital status:      Spouse name: N/A   • Number of children: 3   • Years of education: N/A     Occupational History   • NOT EMPLOYED       Previous Contractor for the [employer]     Social History Main Topics   • Smoking status: Current Every Day Smoker     Packs/day: 1.00     Years: 28.00     Types: Cigarettes   • Smokeless tobacco: Never Used   • Alcohol use No   • Drug use: No   • Sexual activity: Defer     Other Topics Concern   • Not on file     Social History Narrative    Lives in [place], [place] with .      Family History   Problem Relation Age of Onset   • Cancer Mother    • Alzheimer's disease Father    • Osteoarthritis Other      Review of Systems   Constitution: Negative for chills, fever, malaise/fatigue, night sweats and weight loss.   HENT: Positive for hearing loss. Negative for ear pain, odynophagia and sore throat.    Cardiovascular: Positive for dyspnea on exertion. Negative for chest pain, leg swelling, orthopnea and palpitations.   Respiratory: Positive for cough, shortness of breath and wheezing. Negative for hemoptysis.    Endocrine: Negative for cold intolerance, heat intolerance, polydipsia, polyphagia and polyuria.   Hematologic/Lymphatic: Bruises/bleeds easily.   Skin: Negative for itching and rash.   Musculoskeletal: Negative for joint pain, joint swelling and myalgias.   Gastrointestinal: Positive for diarrhea and heartburn. Negative for abdominal pain, constipation, hematemesis, hematochezia, melena, nausea and vomiting.   Genitourinary: Positive for incomplete emptying, nocturia and urgency. Negative for dysuria,  "frequency and hematuria.   Neurological: Positive for headaches. Negative for focal weakness, numbness and seizures.   Psychiatric/Behavioral: Negative for suicidal ideas. The patient is nervous/anxious.    Allergic/Immunologic: Positive for environmental allergies.   All other systems reviewed and are negative.    Vitals:    07/10/18 1423   BP: 97/75   BP Location: Right arm   Patient Position: Sitting   Pulse: 100   Temp: 97.5 °F (36.4 °C)   TempSrc: Temporal Artery    SpO2: 93%   Weight: 64.2 kg (141 lb 9.6 oz)   Height: 167.6 cm (66\")      Physical Exam   Constitutional: She is oriented to person, place, and time. She appears well-developed and well-nourished. No distress.   HENT:   Head: Normocephalic and atraumatic.   Eyes: Conjunctivae and EOM are normal. No scleral icterus.   Neck: Normal range of motion. Neck supple. No JVD present. No tracheal deviation present.   Cardiovascular: Normal rate, regular rhythm and normal heart sounds.  Exam reveals no gallop and no friction rub.    No murmur heard.  Pulmonary/Chest: Effort normal and breath sounds normal. No stridor. No respiratory distress. She has no wheezes. She has no rales.   Abdominal: Soft. She exhibits no distension and no mass. There is no tenderness. There is no rebound and no guarding.   Musculoskeletal: Normal range of motion. She exhibits no deformity.   Lymphadenopathy:     She has no cervical adenopathy.     She has no axillary adenopathy.        Right: No supraclavicular adenopathy present.        Left: No supraclavicular adenopathy present.   Neurological: She is alert and oriented to person, place, and time.   Skin: No rash noted. No erythema.   Psychiatric: She has a normal mood and affect. Her behavior is normal. Judgment and thought content normal.       Labs/Imaging:  -MRI brain performed 6/26/18, demonstrates no evidence of metastatic disease  -PET/CT performed 6/26/18, personally reviewed, demonstrates an increasing right middle lobe " lesion with an SUV of 4.5.  Right paratracheal lymph node with an SUV of 4.7.  Right hilar nodes with an SUV of 5.6.  Subcarinal lymph nodes with an SUV of 3.4.   Right thyroid with SUV of 3.7    Assessment/Plan: 48 year old  female with a history of uterine cancer, active tobacco abuse and COPD who is status post right VATS lower lobectomy on 9/15/16 for a [id number] Stage IA adenocarcinoma. She presents today for recurrent lung cancer. She has elected to receive treatment with chemoradiation.  Dr. Patterson has requested mediport placement for chemotherapy administration. The risks and benefits of the procedure were discussed with the patient including pain, bleeding, infection, pneumothorax requiring a chest tube, myocardial infarction and death.  She understood these risks and wished to proceed with surgery.     I, Dr. Elder Ham, personally performed the services described in the documentation as scribed in my presence and the documentation is both accurate and complete.        Patient Active Problem List   Diagnosis   • Lung nodule   • Osteoporosis   • Syringomyelia (CMS/HCC)   • Adenocarcinoma of right lung, lower lobe, stage 1   • Osteoarthritis (arthritis due to wear and tear of joints)   • COPD (chronic obstructive pulmonary disease) (CMS/HCC)   • Allergic rhinitis   • Tobacco use

## 2018-07-16 NOTE — TELEPHONE ENCOUNTER
SW called pt to inquire about her new appointment for port placement.  Her appointment is on July 20 at 9:30 am.  Radiation can work her in for her CT sim at 8:30 am that morning.  MARIANA called pt to relay this information to pt.  SW verbalized understanding and agreement.  SW available for ongoing support and resource needs.

## 2018-07-20 NOTE — ANESTHESIA PREPROCEDURE EVALUATION
Anesthesia Evaluation                  Airway   Mallampati: I  TM distance: >3 FB  Neck ROM: full  No difficulty expected  Dental - normal exam     Pulmonary - normal exam   (+) a smoker Current, lung cancer, COPD,   Cardiovascular - normal exam        Neuro/Psych  GI/Hepatic/Renal/Endo    (+)  hiatal hernia, GERD,      Musculoskeletal     (+) back pain,   Abdominal  - normal exam    Bowel sounds: normal.   Substance History      OB/GYN          Other   (+) arthritis   history of cancer (LUNG, UTERINE)                    Anesthesia Plan    ASA 3     general     intravenous induction   Anesthetic plan and risks discussed with patient.    Plan discussed with CRNA.

## 2018-07-20 NOTE — INTERVAL H&P NOTE
Pre-Op H&P (See Recent Office Note Attached for Full H&P)    History and physical note from office (dated 7/10/2018) reviewed and updated with the following, otherwise there are no changes in H&P:    Review of Systems:  General ROS:  no fever, chills, rashes, No change since last office visit  Cardiovascular ROS: no chest pain or dyspnea on exertion  Respiratory ROS: no cough, shortness of breath, or wheezing    Immunization History:   Influenza:  No   Pneumococcal:  No    Tetanus:  Unknown     Meds:    No current facility-administered medications on file prior to encounter.      Current Outpatient Prescriptions on File Prior to Encounter   Medication Sig Dispense Refill   • cetirizine (zyrTEC) 10 MG tablet Take 10 mg by mouth Daily.     • Cholecalciferol (VITAMIN D3) 1000 UNITS capsule Take 1 capsule by mouth daily.     • dexamethasone (DECADRON) 4 MG tablet Take 2 tablets in the morning daily on Days 2,3 and 4.  Take with food. 6 tablet 2   • famotidine (PEPCID) 40 MG tablet Take 40 mg by mouth daily.  0   • fluticasone (FLONASE) 50 MCG/ACT nasal spray 2 sprays into each nostril Daily.  1   • folic acid (FOLVITE) 1 MG tablet Take 1 tablet by mouth Daily. Start at least 7 days prior to chemotherapy until at least 3 weeks after all chemotherapy. 30 tablet 4   • HYDROcodone-acetaminophen (NORCO)  MG per tablet Take 1 tablet by mouth every 6 (six) hours as needed for severe pain (7-10).     • ondansetron (ZOFRAN) 8 MG tablet Take 1 tablet by mouth 3 (Three) Times a Day As Needed for Nausea or Vomiting. 30 tablet 5   • ondansetron ODT (ZOFRAN-ODT) 4 MG disintegrating tablet Take 1 tablet by mouth every 8 (eight) hours as needed (for nausea). (Patient taking differently: Take 4 mg by mouth Every 8 (Eight) Hours As Needed for Nausea (for nausea).) 30 tablet 0   • RA CALCIUM 600 600 MG tablet Take 600 mg by mouth 2 (two) times a day with meals.  0   • RA SALINE NASAL SPRAY 0.65 % nasal spray 1 spray into each  "nostril Daily As Needed.  1   • lidocaine-prilocaine (EMLA) 2.5-2.5 % cream Apply  topically As Needed (prior to port access). 30 g 3   • montelukast (SINGULAIR) 10 MG tablet Take 10 mg by mouth Daily.  1       Vital Signs:  BP 99/68 (BP Location: Right arm, Patient Position: Lying)   Pulse 69   Resp 16   Ht 167.6 cm (66\")   Wt 63.5 kg (140 lb)   SpO2 98%   BMI 22.60 kg/m²     Physical Exam:    CV:  S1S2 regular rate and rhythm, no murmur               Resp:  Clear to auscultation; respirations regular, even and unlabored    Results Review:    I reviewed the patient's new clinical results.    Cancer Staging (if applicable)  Cancer Patient: X  yes __no __unknown; If yes, [id number] Stage IA adenocarcinoma of the lung (recurrent)  Assessment/Plan:  Recurrent lung cancer  /  Mediport placement for chemotherapy administration      VEGA Dudley  7/20/2018   11:58 AM    "

## 2018-07-20 NOTE — ANESTHESIA POSTPROCEDURE EVALUATION
Patient: Moriah Qiu    Procedure Summary     Date:  07/20/18 Room / Location:   [facility] /  [facility]    Anesthesia Start:  1305 Anesthesia Stop:  1357    Procedure:  PORT PLACEMENT (N/A Chest) Diagnosis:       Lung nodule      (Lung nodule [R91.1])    Provider:  Elder Ham MD Provider:  Lit Linda MD    Anesthesia Type:  general ASA Status:  3          Anesthesia Type: general  Last vitals  BP   104/68 (07/20/18 1357)   Temp   97 °F (36.1 °C) (07/20/18 1357)   Pulse   74 (07/20/18 1357)   Resp   16 (07/20/18 1357)     SpO2   92 % (07/20/18 1357)     Post Anesthesia Care and Evaluation    Patient location during evaluation: PACU  Patient participation: complete - patient participated  Level of consciousness: awake and alert  Pain management: adequate  Airway patency: patent  Anesthetic complications: No anesthetic complications  PONV Status: none  Cardiovascular status: acceptable  Respiratory status: acceptable  Hydration status: acceptable

## 2018-07-21 NOTE — OP NOTE
DATE OF PROCEDURE: 7/20/2018     PREOPERATIVE DIAGNOSES:  1. Adenocarcinoma of the right lower lobe (J1qB5T1) status post resection 9/15/16  2. Right upper lobe recurrence with Station 7 lymph node involvement  3. History of uterine cancer  4. Tobacco abuse     POSTOPERATIVE DIAGNOSES:    1. Adenocarcinoma of the right lower lobe (K1bF6G8) status post resection 9/15/16  2. Right upper lobe recurrence with Station 7 lymph node involvement  3. History of uterine cancer  4. Tobacco abuse    PROCEDURES PERFORMED:    1. Mediport insertion (left subclavian vein)    SURGEON: Elder Ham MD      ANESTHESIA: Monitored anesthesia care with Aubree Francois CRNA     ESTIMATED BLOOD LOSS: Less than 25 mL.      TOTAL FLUROSCOPY TIME: 18 seconds    EXPOSURE: 2 mGy     INDICATIONS:  48 year old  female well known to me status post right VATS lower lobectomy on 9/15/16 for a [id number] Adenocarcinoma who was found to have recurrent disease in the right upper lobe and a station 7 lymph node diagnosed via EBUS.  Dr. Patterson is evaluating the patient for chemotherapy and Dr. Lozano for radiation.  The patient was felt to be a reasonable candidate for mediport insertion at the request of Dr. Patterson. The risks and benefits of surgery were discussed with the patient including pain, bleeding, infection, pneumothorax and death. The patient understood these risks and wished to proceed with surgery.      DESCRIPTION OF PROCEDURE:  The patient was taken to the operating room and placed under monitored anesthesia care.  She was prepped and draped in the usual sterile fashion and a timeout was performed including the patient's name, procedure and antibiotic administration.  One percent lidocaine was administered in the left deltopectoral groove.  Needle access of the left subclavian vein was achieved on the first needle stick with good return of dark nonpulsatile blood.  The wire was easily threaded into the superior vena cava under  fluoroscopy.  Additional local anesthetic was infiltrated for a pocket incision.  The pocket was developed with electrocautery and hemostasis was achieved on thorough inspection.  The needle access site was enlarged with an 11 blade and tunneler passed into the Mediport pocket incision.  The breakaway sheath was then advanced over the wire using modified Seldinger technique under direct fluoroscopy.  The dilator and wire were removed and the Mediport tubing was advanced to level of the atriocaval junction.  This was connected to the tunneler and pulled within the Mediport pocket incision.  The tubing was trimmed to the appropriate length, connected to the Mediport housing and overlying locking nut secured to the Mediport.  A straight Villagran needle was utilized to aspirate easily dark venous blood.  The Mediport was flushed with concentrated heparin solution.  An additional fluoroscopic image was obtained and revealed no kinks within the Mediport tubing and satisfactory position at the level of the atriocaval junction.  The needle access site was closed with a single interrupted 3-0 Vicryl suture.  Interrupted 3-0 Vicryl sutures were placed in the dermal layer ×3 of the Mediport pocket incision.  It should be noted that the patient's tissues were extremely thin and friable.  Sutures easily pulled through the tissue despite deep bites.  A running dermal closure was performed with a 3-0 Vicryl followed by 4-0 Monocryl subcuticular stitch.  Overlying skin glue was applied to the incisions.  Steri strips were also applied to provide additional support to the wound.  The patient tolerated the procedure well and was transported to the recovery room in stable condition.

## 2018-07-26 NOTE — PROGRESS NOTES
CHIEF COMPLAINT: History of stage I lung cancer with subsequent gagandeep recurrence   Problem List:     Adenocarcinoma of right lung, lower lobe, stage 1    8/10/2016 Initial Diagnosis     Adenocarcinoma of right lung, lower lobe, stage 1A status post right lower lobectomy September 2016.  On serial CT had new right upper lobe abnormality that was spiculated but actually decreased in size on May 2018 CT but nonetheless underwent bronchoscopy which showed level VII node involvement on 6/5/18.  No clear-cut disease at the left lower lobe stump.  CBC and CMP unremarkable.  Saw me for the first time 6/14/18.  This was completely asymptomatic.  I recommended PET scan and MRI of the brain.  Results are below with no extrathoracic metastasis.  I discussed the results of her 6/26/18 PET and MRI brain on 6/28/18 with Dr. Grey and he does not think that she would be eligible for resection.  Likewise, this known level VII node involvement on biopsy is under represented by the PET and we could not be certain that he had thoroughly treated this area.  Hence, he and I came to the mutual consensus that definitive chemotherapy and radiation would be her best bet at eradication though certainly not guarantee by any stretch.  She was subsequently educated on cisplatin and Alimta plus radiation and port will be placed by Dr. Ham.         6/26/2018 Imaging     Pet:  Impression:     1. Partially cavitary metabolic paramedian nodule posterior right lung  is concerning for second primary lesion or metastasis.  2. Hypermetabolic gagandeep activity is stable nonenlarged lymph nodes. With  the inflammatory changes in the upper lobes these might be reactive but  followup should be considered.  3. Nonspecific tiny focus of hypermetabolic activity posterior to the  thyroid gland. Attention on followup recommended     MRI brain negative for metastasis    CMP and CBC unremarkable save for alkaline phosphatase of 122            HISTORY OF PRESENT  ILLNESS:  The patient is a 48 y.o. female, here for follow up on management of stage I non-small cell lung cancer now with level VII gagandeep recurrence due for definitive chemotherapy radiation with no distant metastases.  Port now in place.      Past Medical History:   Diagnosis Date   • Back pain    • COPD (chronic obstructive pulmonary disease) (CMS/HCC)     emphysema   • Esophagus hernia    • Gall stone    • GERD (gastroesophageal reflux disease)    • History of stomach ulcers    • Lung cancer (CMS/HCC)    • Osteoarthritis (arthritis due to wear and tear of joints)    • Osteoporosis    • Uterine cancer (CMS/HCC) 1997    ovarian 1997   • Wears dentures      Past Surgical History:   Procedure Laterality Date   • BRONCHOSCOPY N/A 8/4/2016    Procedure: NAVIGATIONAL BRONCHOSCOPY;  Surgeon: Elder Tompkins MD;  Location:  [facility];  Service:    • BRONCHOSCOPY N/A 8/4/2016    Procedure: BRONCHOSCOPY WITH ENDOBRONCHIAL ULTRASOUND;  Surgeon: Elder Tompkins MD;  Location:  [facility];  Service:    • BRONCHOSCOPY N/A 6/5/2018    Procedure: BRONCHOSCOPY WITH ENDOBRONCHIAL ULTRASOUND WITH FLUORO;  Surgeon: Kingston Carrasquillo MD;  Location:  [facility];  Service: Pulmonary   • COLONOSCOPY      2016   • GALLBLADDER SURGERY  2016   • HYSTERECTOMY  1997   • THORACOSCOPY Right 9/15/2016    Procedure: BRONOSCOPY, RIGHT THORACOSCOPY VIDEO ASSISTED LOBECTOMY, MEDIASTINAL LYMPH NODE DISECTION;  Surgeon: Elder Ham MD;  Location: [facility];  Service:    • VENOUS ACCESS DEVICE (PORT) INSERTION N/A 7/20/2018    Procedure: PORT PLACEMENT;  Surgeon: Elder Ham MD;  Location: [facility] HYBRID OR 15;  Service: Cardiothoracic       No Known Allergies    Family History and Social History reviewed and changed as necessary      REVIEW OF SYSTEM:   Review of Systems   Constitutional: Negative for appetite change, chills, diaphoresis, fatigue, fever and unexpected weight change.   HENT:   Negative for mouth sores,  "sore throat and trouble swallowing.    Eyes: Negative for icterus.   Respiratory: Negative for cough, hemoptysis and shortness of breath.    Cardiovascular: Negative for chest pain, leg swelling and palpitations.   Gastrointestinal: Negative for abdominal distention, abdominal pain, blood in stool, constipation, diarrhea, nausea and vomiting.   Endocrine: Negative for hot flashes.   Genitourinary: Negative for bladder incontinence, difficulty urinating, dysuria, frequency and hematuria.    Musculoskeletal: Negative for gait problem, neck pain and neck stiffness.   Skin: Negative for rash.   Neurological: Negative for dizziness, gait problem, headaches, light-headedness and numbness.   Hematological: Negative for adenopathy. Does not bruise/bleed easily.   Psychiatric/Behavioral: Negative for depression. The patient is not nervous/anxious.    All other systems reviewed and are negative.       PHYSICAL EXAM    Vitals:    07/26/18 0913   BP: 102/69   Pulse: 89   Resp: 18   Temp: 97.9 °F (36.6 °C)   Weight: 64.4 kg (142 lb)   Height: 167.6 cm (66\")     Constitutional: Appears well-developed and well-nourished. No distress.   ECOG: (0) Fully active, able to carry on all predisease performance without restriction  HENT:   Head: Normocephalic.   Mouth/Throat: Oropharynx is clear and moist.   Eyes: Conjunctivae are normal. Pupils are equal, round, and reactive to light. No scleral icterus.   Neck: Neck supple. No JVD present. No thyromegaly present.   Cardiovascular: Normal rate, regular rhythm and normal heart sounds.    Pulmonary/Chest: Breath sounds normal. No respiratory distress.   Abdominal: Soft. Exhibits no distension and no mass. There is no hepatosplenomegaly. There is no tenderness. There is no rebound and no guarding.   Musculoskeletal:Exhibits no edema, tenderness or deformity.   Neurological: Alert and oriented to person, place, and time. Exhibits normal muscle tone.   Skin: No ecchymosis, no petechiae and no " rash noted. Not diaphoretic. No cyanosis. Nails show no clubbing.   Psychiatric: Normal mood and affect.   Vitals reviewed.      Xr Chest 1 View    Result Date: 7/20/2018  A Port-A-Cath is well positioned. There is no pneumothorax.  D:  07/20/2018 E:  07/20/2018  This report was finalized on 7/20/2018 4:21 PM by Dr. Fredrick Martinez MD.              ASSESSMENT & PLAN:    1.  History of stage I lung cancer  2. Now with gagandeep recurrence    Discussion: I spoke with the patient as well as with Dr. Lozano.  She has been simulated.  We will get her B12 and folic acid started today and we will start her treatment a week from today with cisplatin and Alimta and see her 3 weeks after that with my nurse practitioner assuming she's tolerating it in the interim.  Education and informed consent has been performed.  Following the radiation and chemotherapy I would plan on giving her a total of 3 courses of cisplatin and Alimta with radiation and then repeat the PET and if that's negative go to follow-up serial imaging.  She knows that we cannot promise permanent remission but there is a possibility of such.  Discussed with patient 45 minutes greater than 50% spent in counseling.      Segundo Patterson MD    07/26/2018

## 2018-07-26 NOTE — PROGRESS NOTES
Oncology Nutrition Screening    Patient Name:  Moriah Qiu  YOB: 1970  MRN: [id number]  Date:  07/26/18  Physician:  Dr. Patterson / Dr. Lozano    Type of Cancer Treatment:   Radiation/Cyberknife: definitive x 30 treatments  Chemotherapy:  Cisplatin / Alimta - every 21 days x 3    Patient Active Problem List   Diagnosis   • Lung nodule   • Osteoporosis   • Syringomyelia (CMS/HCC)   • Adenocarcinoma of right lung, lower lobe, stage 1   • Osteoarthritis (arthritis due to wear and tear of joints)   • COPD (chronic obstructive pulmonary disease) (CMS/HCC)   • Allergic rhinitis   • Tobacco use       Current Outpatient Prescriptions   Medication Sig Dispense Refill   • cetirizine (zyrTEC) 10 MG tablet Take 10 mg by mouth Daily.     • Cholecalciferol (VITAMIN D3) 1000 UNITS capsule Take 1 capsule by mouth daily.     • dexamethasone (DECADRON) 4 MG tablet Take 2 tablets in the morning daily on Days 2,3 and 4.  Take with food. 6 tablet 2   • famotidine (PEPCID) 40 MG tablet Take 40 mg by mouth daily.  0   • fluticasone (FLONASE) 50 MCG/ACT nasal spray 2 sprays into each nostril Daily.  1   • folic acid (FOLVITE) 1 MG tablet Take 1 tablet by mouth Daily. Start at least 7 days prior to chemotherapy until at least 3 weeks after all chemotherapy. 30 tablet 4   • HYDROcodone-acetaminophen (NORCO)  MG per tablet Take 1 tablet by mouth every 6 (six) hours as needed for severe pain (7-10).     • lidocaine-prilocaine (EMLA) 2.5-2.5 % cream Apply  topically As Needed (prior to port access). 30 g 3   • montelukast (SINGULAIR) 10 MG tablet Take 10 mg by mouth Daily.  1   • ondansetron (ZOFRAN) 8 MG tablet Take 1 tablet by mouth 3 (Three) Times a Day As Needed for Nausea or Vomiting. 30 tablet 5   • ondansetron ODT (ZOFRAN-ODT) 4 MG disintegrating tablet Take 1 tablet by mouth every 8 (eight) hours as needed (for nausea). (Patient taking differently: Take 4 mg by mouth Every 8 (Eight) Hours As Needed for Nausea (for  nausea).) 30 tablet 0   • pantoprazole (PROTONIX) 40 MG EC tablet   0   • RA CALCIUM 600 600 MG tablet Take 600 mg by mouth 2 (two) times a day with meals.  0   • RA SALINE NASAL SPRAY 0.65 % nasal spray 1 spray into each nostril Daily As Needed.  1     No current facility-administered medications for this visit.        Glycemic Risk:   Deepti    Weight:   Height: 66 inches  Weight: 142 lbs.  Usual Body Weight: same lbs.   BMI: 22.9  Normal  Weight has been stable    Oral Food Intake:  Regular Diet - No Restrictions  Ensure    Hydration Status:   How many 8 ounce glass of water of fluid do you drink per day?  Will assess at time of consult    Enteral Feeding:   n/a    Nutrition Symptoms:   Dry Mouth  Constipation  Fatigue  Abdominal Pain    Activity:   Not my normal self, but able to be up and about with fairly normal activities     reports that she has been smoking Cigarettes.  She has a 28.00 pack-year smoking history. She has never used smokeless tobacco. She reports that she does not drink alcohol or use drugs.    Evaluation of Nutritional Risk:   Patient has been identified at nutritional risk due to diagnosis, treatment plan, and nutrition impact symptoms.  Note patient's initial chemotherapy infusion appointment has been rescheduled to Friday 8/3 and Radiation to start on Monday 8/6.  Will plan to met with patient at upcoming appointment for nutritional assessment.    Electronically signed by:  Valeria Dewey RD  3:32 PM

## 2018-08-03 NOTE — TELEPHONE ENCOUNTER
----- Message from Elder Ham MD sent at 7/24/2018  7:46 PM EDT -----  Yes please.  At patient's convenience 2-4 weeks    ----- Message -----  From: Oliverio Nicole  Sent: 7/24/2018   4:32 PM  To: MD Dr. Fatoumata Khan you did a port placement on this pt on 07/20/2018. Would you like to see this pt back in the office for a F/U?    Thanks.

## 2018-08-03 NOTE — PROGRESS NOTES
SW met with pt and her  during her first infusion to provide support and resources.  Pt starts her radiation on Monday and plans on driving back and forth with her .  SW provided a brochure about [facility] and encouraged pt to notify if she would like to stay there instead of traveling back and forth.  Pt states that she does not have any other psychosocial needs at this time. She states that she does not feel very stressed today.  SW provided contact information for future needs or concerns.  SW available for ongoing support and resource needs.

## 2018-08-24 NOTE — PROGRESS NOTES
CHIEF COMPLAINT: History of stage I lung cancer with subsequent gagandeep recurrence     Problem List:     Adenocarcinoma of right lung, lower lobe, stage 1    8/10/2016 Initial Diagnosis     Adenocarcinoma of right lung, lower lobe, stage 1A status post right lower lobectomy September 2016.  On serial CT had new right upper lobe abnormality that was spiculated but actually decreased in size on May 2018 CT but nonetheless underwent bronchoscopy which showed level VII node involvement on 6/5/18.  No clear-cut disease at the left lower lobe stump.  CBC and CMP unremarkable.  Saw me for the first time 6/14/18.  This was completely asymptomatic.  I recommended PET scan and MRI of the brain.  Results are below with no extrathoracic metastasis.  I discussed the results of her 6/26/18 PET and MRI brain on 6/28/18 with Dr. Grey and he does not think that she would be eligible for resection.  Likewise, this known level VII node involvement on biopsy is under represented by the PET and we could not be certain that he had thoroughly treated this area.  Hence, he and I came to the mutual consensus that definitive chemotherapy and radiation would be her best bet at eradication though certainly not guarantee by any stretch.  She was subsequently educated on cisplatin and Alimta plus radiation and port will be placed by Dr. Ham.         6/26/2018 Imaging     Pet:  Impression:     1. Partially cavitary metabolic paramedian nodule posterior right lung  is concerning for second primary lesion or metastasis.  2. Hypermetabolic gagandeep activity is stable nonenlarged lymph nodes. With  the inflammatory changes in the upper lobes these might be reactive but  followup should be considered.  3. Nonspecific tiny focus of hypermetabolic activity posterior to the  thyroid gland. Attention on followup recommended     MRI brain negative for metastasis    CMP and CBC unremarkable save for alkaline phosphatase of 122            HISTORY OF  PRESENT ILLNESS:  The patient is a 48 y.o. female, here for follow up on management of stage I non-small cell lung cancer now with level VII gagandeep recurrence due for definitive chemotherapy radiation with no distant metastases.  Tolerating chemotherapy and radiation without significant side effects.  Mild fatigue.  Is driving daily for treatments.        Past Medical History:   Diagnosis Date   • Back pain    • COPD (chronic obstructive pulmonary disease) (CMS/HCC)     emphysema   • Esophagus hernia    • Gall stone    • GERD (gastroesophageal reflux disease)    • History of stomach ulcers    • Lung cancer (CMS/HCC)    • Osteoarthritis (arthritis due to wear and tear of joints)    • Osteoporosis    • Uterine cancer (CMS/HCC) 1997    ovarian 1997   • Wears dentures      Past Surgical History:   Procedure Laterality Date   • BRONCHOSCOPY N/A 8/4/2016    Procedure: NAVIGATIONAL BRONCHOSCOPY;  Surgeon: Elder Tompkins MD;  Location:  [facility];  Service:    • BRONCHOSCOPY N/A 8/4/2016    Procedure: BRONCHOSCOPY WITH ENDOBRONCHIAL ULTRASOUND;  Surgeon: Elder Tompkins MD;  Location:  [facility];  Service:    • BRONCHOSCOPY N/A 6/5/2018    Procedure: BRONCHOSCOPY WITH ENDOBRONCHIAL ULTRASOUND WITH FLUORO;  Surgeon: Kingston Carrasquillo MD;  Location:  [facility];  Service: Pulmonary   • COLONOSCOPY      2016   • GALLBLADDER SURGERY  2016   • HYSTERECTOMY  1997   • THORACOSCOPY Right 9/15/2016    Procedure: BRONOSCOPY, RIGHT THORACOSCOPY VIDEO ASSISTED LOBECTOMY, MEDIASTINAL LYMPH NODE DISECTION;  Surgeon: Elder Ham MD;  Location:  [facility];  Service:    • VENOUS ACCESS DEVICE (PORT) INSERTION N/A 7/20/2018    Procedure: PORT PLACEMENT;  Surgeon: Elder Ham MD;  Location: [facility] HYBRID OR 15;  Service: Cardiothoracic       No Known Allergies    Family History and Social History reviewed and changed as necessary      REVIEW OF SYSTEM:   Review of Systems   Constitutional: Negative for appetite  "change, chills, diaphoresis, fever and unexpected weight change. Positive for mild fatigue.  HENT:   Negative for mouth sores, sore throat and trouble swallowing.    Eyes: Negative for icterus.   Respiratory: Negative for cough, hemoptysis and shortness of breath.    Cardiovascular: Negative for chest pain, leg swelling and palpitations.   Gastrointestinal: Negative for abdominal distention, abdominal pain, blood in stool, constipation, diarrhea, nausea and vomiting.   Endocrine: Negative for hot flashes.   Genitourinary: Negative for bladder incontinence, difficulty urinating, dysuria, frequency and hematuria.    Musculoskeletal: Negative for gait problem, neck pain and neck stiffness.   Skin: Negative for rash.   Neurological: Negative for dizziness, gait problem, headaches, light-headedness and numbness.   Hematological: Negative for adenopathy. Does not bruise/bleed easily.   Psychiatric/Behavioral: Negative for depression. The patient is not nervous/anxious.    All other systems reviewed and are negative.       PHYSICAL EXAM    Vitals:    08/24/18 0928   BP: 104/72   Pulse: 84   Resp: 16   Temp: 97.5 °F (36.4 °C)   Weight: 64.4 kg (142 lb)   Height: 167.6 cm (66\")     Constitutional: Appears well-developed and well-nourished. No distress.   ECOG: (0) Fully active, able to carry on all predisease performance without restriction  HENT:   Head: Normocephalic.   Mouth/Throat: Oropharynx is clear and moist.   Eyes: Conjunctivae are normal. Pupils are equal, round, and reactive to light. No scleral icterus.   Neck: Neck supple. No JVD present. No thyromegaly present.   Cardiovascular: Normal rate, regular rhythm and normal heart sounds.    Pulmonary/Chest: Breath sounds normal. No respiratory distress.   Abdominal: Soft. Exhibits no distension and no mass. There is no hepatosplenomegaly. There is no tenderness. There is no rebound and no guarding.   Musculoskeletal:Exhibits no edema, tenderness or deformity. "   Neurological: Alert and oriented to person, place, and time. Exhibits normal muscle tone.   Skin: No ecchymosis, no petechiae and no rash noted. Not diaphoretic. No cyanosis. Nails show no clubbing.   Psychiatric: Normal mood and affect.   Vitals reviewed.  Labs reviewed.    ASSESSMENT & PLAN:    1. History of stage I lung cancer  2. Now with gagandeep recurrence  3. Treatment related fatigue    Discussion: Overall tolerating treatment well, mild fatigue.  No fevers or chills, no mucositis.  Mild treatment related fatigue.  We will continue with cisplatin and Alimta and see her back in 3 weeks for follow up.  We plan on giving her a total of 3 courses of cisplatin and Alimta with radiation and then repeat the PET and if that's negative go to follow-up serial imaging.  She knows that we cannot promise permanent remission but there is a possibility of such.      I spent 15 minutes with the patient. I spent > 50% percent of this time counseling and discussing prognosis, diagnostic testing, evaluation, current status and management.    Renu Sanders, APRN    08/24/2018

## 2018-09-14 NOTE — PROGRESS NOTES
CHIEF COMPLAINT:  History of stage I lung cancer with subsequent gagandeep recurrence     Problem List:     Adenocarcinoma of right lung, lower lobe, stage 1    8/10/2016 Initial Diagnosis     Adenocarcinoma of right lung, lower lobe, stage 1A status post right lower lobectomy September 2016.  On serial CT had new right upper lobe abnormality that was spiculated but actually decreased in size on May 2018 CT but nonetheless underwent bronchoscopy which showed level VII node involvement on 6/5/18.  No clear-cut disease at the left lower lobe stump.  CBC and CMP unremarkable.  Saw me for the first time 6/14/18.  This was completely asymptomatic.  I recommended PET scan and MRI of the brain.  Results are below with no extrathoracic metastasis.  I discussed the results of her 6/26/18 PET and MRI brain on 6/28/18 with Dr. Grey and he does not think that she would be eligible for resection.  Likewise, this known level VII node involvement on biopsy is under represented by the PET and we could not be certain that he had thoroughly treated this area.  Hence, he and I came to the mutual consensus that definitive chemotherapy and radiation would be her best bet at eradication though certainly not guarantee by any stretch.  She was subsequently educated on cisplatin and Alimta plus radiation and port will be placed by Dr. Ham.         6/26/2018 Imaging     Pet:  Impression:     1. Partially cavitary metabolic paramedian nodule posterior right lung  is concerning for second primary lesion or metastasis.  2. Hypermetabolic gagandeep activity is stable nonenlarged lymph nodes. With  the inflammatory changes in the upper lobes these might be reactive but  followup should be considered.  3. Nonspecific tiny focus of hypermetabolic activity posterior to the  thyroid gland. Attention on followup recommended     MRI brain negative for metastasis    CMP and CBC unremarkable save for alkaline phosphatase of 122            HISTORY OF  PRESENT ILLNESS:  The patient is a 48 y.o. female, here for follow up on management of stage I non-small cell lung cancer now with level VII gagandeep recurrence with no distant metastases undergoing definitive chemotherapy radiation.  Tolerating chemotherapy and radiation without significant side effects.  Mild fatigue and otherwise doing well, no mouth sores, no diarrhea.  Denies any fevers or chills.      Past Medical History:   Diagnosis Date   • Back pain    • COPD (chronic obstructive pulmonary disease) (CMS/HCC)     emphysema   • Esophagus hernia    • Gall stone    • GERD (gastroesophageal reflux disease)    • History of stomach ulcers    • Lung cancer (CMS/HCC)    • Osteoarthritis (arthritis due to wear and tear of joints)    • Osteoporosis    • Uterine cancer (CMS/HCC) 1997    ovarian 1997   • Wears dentures      Past Surgical History:   Procedure Laterality Date   • BRONCHOSCOPY N/A 8/4/2016    Procedure: NAVIGATIONAL BRONCHOSCOPY;  Surgeon: Elder Tompkins MD;  Location:  [facility];  Service:    • BRONCHOSCOPY N/A 8/4/2016    Procedure: BRONCHOSCOPY WITH ENDOBRONCHIAL ULTRASOUND;  Surgeon: Elder Tompkins MD;  Location:  [facility];  Service:    • BRONCHOSCOPY N/A 6/5/2018    Procedure: BRONCHOSCOPY WITH ENDOBRONCHIAL ULTRASOUND WITH FLUORO;  Surgeon: Kingston Carrasquillo MD;  Location:  [facility];  Service: Pulmonary   • COLONOSCOPY      2016   • GALLBLADDER SURGERY  2016   • HYSTERECTOMY  1997   • THORACOSCOPY Right 9/15/2016    Procedure: BRONOSCOPY, RIGHT THORACOSCOPY VIDEO ASSISTED LOBECTOMY, MEDIASTINAL LYMPH NODE DISECTION;  Surgeon: Elder Ham MD;  Location:  [facility];  Service:    • VENOUS ACCESS DEVICE (PORT) INSERTION N/A 7/20/2018    Procedure: PORT PLACEMENT;  Surgeon: Elder Ham MD;  Location:  [facility];  Service: Cardiothoracic       No Known Allergies    Family History and Social History reviewed and changed as necessary      REVIEW OF SYSTEM:   Review  "of Systems   Constitutional: Negative for appetite change, chills, diaphoresis, fever and unexpected weight change. Positive for mild fatigue.  HENT:   Negative for mouth sores, sore throat and trouble swallowing.    Eyes: Negative for icterus.   Respiratory: Negative for cough, hemoptysis and shortness of breath.    Cardiovascular: Negative for chest pain, leg swelling and palpitations.   Gastrointestinal: Negative for abdominal distention, abdominal pain, blood in stool, constipation, diarrhea, nausea and vomiting.   Endocrine: Negative for hot flashes.   Genitourinary: Negative for bladder incontinence, difficulty urinating, dysuria, frequency and hematuria.    Musculoskeletal: Negative for gait problem, neck pain and neck stiffness.   Skin: Negative for rash.   Neurological: Negative for dizziness, gait problem, headaches, light-headedness and numbness.   Hematological: Negative for adenopathy. Does not bruise/bleed easily.   Psychiatric/Behavioral: Negative for depression. The patient is not nervous/anxious.    All other systems reviewed and are negative.       PHYSICAL EXAM    Vitals:    09/14/18 0830   BP: 109/70   Pulse: 115   Resp: 20   Temp: 97.6 °F (36.4 °C)   SpO2: 94%   Weight: 62.1 kg (137 lb)   Height: 167.6 cm (66\")     Constitutional: Appears well-developed and well-nourished. No distress.   ECOG: (0) Fully active, able to carry on all predisease performance without restriction  HENT:   Head: Normocephalic.   Mouth/Throat: Oropharynx is clear and moist.   Eyes: Conjunctivae are normal. Pupils are equal, round, and reactive to light. No scleral icterus.   Neck: Neck supple. No JVD present. No thyromegaly present.   Cardiovascular: Normal rate, regular rhythm and normal heart sounds.    Pulmonary/Chest: Breath sounds normal. No respiratory distress.   Abdominal: Soft. Exhibits no distension and no mass. There is no hepatosplenomegaly. There is no tenderness. There is no rebound and no guarding. "   Musculoskeletal:Exhibits no edema, tenderness or deformity.   Neurological: Alert and oriented to person, place, and time. Exhibits normal muscle tone.   Skin: No ecchymosis, no petechiae and no rash noted. Not diaphoretic. No cyanosis. Nails show no clubbing.   Psychiatric: Normal mood and affect.   Vitals reviewed.  Labs reviewed.    ASSESSMENT & PLAN:    1. History of stage I lung cancer  2. Now with gagandeep recurrence  3. Treatment related fatigue    Discussion: Overall tolerating treatment well other than for mild fatigue.  No fevers or chills, no mucositis.  We will continue with cisplatin and Alimta today for her last cycle, she completes radiation around September 26.  About 6 weeks out from completion we will repeat the PET and if that's negative go to follow-up serial imaging.  We will see her back following her PET/CT to go over the results and for further treatment planning.    I spent 15 minutes with the patient. I spent > 50% percent of this time counseling and discussing prognosis, diagnostic testing, evaluation, current status and management.    Renu Sanders, APRN    09/14/2018

## 2018-09-18 NOTE — PROGRESS NOTES
[facility] Nutrition    Weight 135.4 lbs / weight decrease 3 lbs. over the past 12 days.    Patient has completed 24/30 radiation treatments and 3/3 cycles chemotherapy.  Patient attributes weight changes to nausea following chemotherapy; nausea is somewhat better today and has allowed her to eat some selected foods. Discussed tips for management and made suggestions for types of foods that patient may find more appealing to her at this time.  Will continue to follow as needed.

## 2018-09-20 NOTE — TELEPHONE ENCOUNTER
Ok to send rx for compazine per Dr. Patterson.  Patient notified and advised to alternate with zofran.  Verbalized understanding.

## 2018-09-20 NOTE — TELEPHONE ENCOUNTER
----- Message from Charlotte OLIVARES Fely sent at 9/20/2018  1:29 PM EDT -----  Regarding: FLORES-MEDICATION AND SICK TO STOMACH  Contact: [phone]  Patient had treatment on 9/14/18 and is still sick to her stomach the Ondanstron is not working. She wants to know what else can be called in? Please call into [facility] in [place], [place].

## 2018-09-25 NOTE — PROGRESS NOTES
Millie from Dr. Hollis's office called, patient is having new chest pain.  He would like her PET/CT moved up to next week.  I will have Danny Beavers reschedule for next week.

## 2018-09-25 NOTE — PROGRESS NOTES
[facility] Nutrition    Weight 131.7 lbs / additional weight loss of 3.6 lbs over the past week related to nausea    Patient states that she received additional medication for management of the nausea and she is now feeling much better.  She reports that eating has improved over the past couple of days and she is now eating well, despite the weight check reflecting additional weight loss.  Patient verbalized no additional nutritional issues.

## 2018-09-26 NOTE — TELEPHONE ENCOUNTER
----- Message from Charlotte Geiger sent at 9/26/2018  3:33 PM EDT -----  Regarding: MARK-MRI RESULTS  Contact: [phone]  Patient called and wants to get her MRI results.

## 2018-09-26 NOTE — TELEPHONE ENCOUNTER
Patient called for results of CT ordered by Dr. Carrasquillo.  Advised patient she would need to contact his office for results since he ordered the test.  Verbalized understanding.

## 2018-10-02 NOTE — TELEPHONE ENCOUNTER
I called the patient after I reviewed CT of the chest she had on 9/17/2018, there is concern over increase in size of paramedian subsolid lesion located in what is the medial segment of the lobe.  She is having pain in her upper back mid spine area.  She is scheduled for PET/CT tomorrow.  We will see her for follow-up on Friday to go over the results and further plan of care.  I offered to send in something for pain, she declined at this time.

## 2018-10-05 PROBLEM — C79.51 BONE METASTASIS: Status: ACTIVE | Noted: 2018-01-01

## 2018-10-05 PROBLEM — Z79.899 ENCOUNTER FOR LONG-TERM (CURRENT) USE OF HIGH-RISK MEDICATION: Status: ACTIVE | Noted: 2018-01-01

## 2018-10-05 NOTE — PROGRESS NOTES
CHIEF COMPLAINT:    1.  Mid back pain                                          2.  History of stage I lung cancer with subsequent gagandeep recurrence     Problem List:     Adenocarcinoma, lung, right (CMS/HCC)    8/10/2016 Initial Diagnosis     Adenocarcinoma of right lung, lower lobe, stage 1A status post right lower lobectomy September 2016.  On serial CT had new right upper lobe abnormality that was spiculated but actually decreased in size on May 2018 CT but nonetheless underwent bronchoscopy which showed level VII node involvement on 6/5/18.  No clear-cut disease at the left lower lobe stump.  CBC and CMP unremarkable.  Saw me for the first time 6/14/18.  This was completely asymptomatic.  I recommended PET scan and MRI of the brain.  Results are below with no extrathoracic metastasis.  I discussed the results of her 6/26/18 PET and MRI brain on 6/28/18 with Dr. Grey and he does not think that she would be eligible for resection.  Likewise, this known level VII node involvement on biopsy is under represented by the PET and we could not be certain that he had thoroughly treated this area.  Hence, he and I came to the mutual consensus that definitive chemotherapy and radiation would be her best bet at eradication though certainly not guarantee by any stretch.  She was subsequently educated on cisplatin and Alimta plus radiation and port will be placed by Dr. Ham.         6/26/2018 Imaging     Pet:  Impression:     1. Partially cavitary metabolic paramedian nodule posterior right lung  is concerning for second primary lesion or metastasis.  2. Hypermetabolic gagandeep activity is stable nonenlarged lymph nodes. With  the inflammatory changes in the upper lobes these might be reactive but  followup should be considered.  3. Nonspecific tiny focus of hypermetabolic activity posterior to the  thyroid gland. Attention on followup recommended     MRI brain negative for metastasis    CMP and CBC unremarkable save for  alkaline phosphatase of 122         8/3/2018 - 9/14/2018 Chemotherapy     OP LUNG PEMEtrexed / CISplatin + XRT           8/9/2018 - 9/26/2018 Radiation     Radiation OncologyTreatment Course:  Moriah Qiu received 6000 cGy in 30 fractions to right lung via External Beam Radiation - EBRT.         9/17/2018 Progression     CT chest IMPRESSION:  1. Continued increase in size of the paramedian subsolid lesion located  in what is the medial segment middle lobe (unusual location relative to  the spine is due to prior right lower lobectomy).  2. Grossly unchanged size of the mediastinal and right hilar lymph  nodes.  3. New sub solid nodule in the posterior aspect of the right upper lobe.  4. New osseous sclerotic metastases involving the imaged spine.           10/3/2018 Imaging     PET/CT IMPRESSION:  Mixed appearance of response to therapy with decreased  hypermetabolism within the anterior mediastinal lymph nodes and  decreased activity within the right lower lobe medial segment pulmonary  nodule indicating treatment response however noted increased sclerosis  and abnormal hypermetabolism within the sacral prominence and body  indicating progression of osseous metastatic involvement. Various other  sites of scattered sclerotic foci within the osseous structures however  no other sites of abnormal hypermetabolism associated with these  lesions.            HISTORY OF PRESENT ILLNESS:  The patient is a 48 y.o. female, here for follow up on management of adenocarcinoma of the lung.  She has finished chemotherapy and radiation without significant side effects.  Within the last 2 weeks she began noting mid back pain that is fairly persistent.  She had CT of the chest on 9/17/2018 as ordered previously by Dr. Carrasquillo this that was concerning for progression of disease.  We then obtained PET scan that shows progression of disease with osseous metastatic involvement.  She is here today to go over those results and further  plan of care.    Past Medical History:   Diagnosis Date   • Back pain    • COPD (chronic obstructive pulmonary disease) (CMS/HCC)     emphysema   • Esophagus hernia    • Gall stone    • GERD (gastroesophageal reflux disease)    • History of stomach ulcers    • Lung cancer (CMS/HCC)    • Osteoarthritis (arthritis due to wear and tear of joints)    • Osteoporosis    • Uterine cancer (CMS/HCC) 1997    ovarian 1997   • Wears dentures      Past Surgical History:   Procedure Laterality Date   • BRONCHOSCOPY N/A 8/4/2016    Procedure: NAVIGATIONAL BRONCHOSCOPY;  Surgeon: Elder Tompkins MD;  Location:  [facility];  Service:    • BRONCHOSCOPY N/A 8/4/2016    Procedure: BRONCHOSCOPY WITH ENDOBRONCHIAL ULTRASOUND;  Surgeon: Elder Tompkins MD;  Location:  [facility];  Service:    • BRONCHOSCOPY N/A 6/5/2018    Procedure: BRONCHOSCOPY WITH ENDOBRONCHIAL ULTRASOUND WITH FLUORO;  Surgeon: Kingston Carrasquillo MD;  Location:  [facility];  Service: Pulmonary   • COLONOSCOPY      2016   • GALLBLADDER SURGERY  2016   • HYSTERECTOMY  1997   • THORACOSCOPY Right 9/15/2016    Procedure: BRONOSCOPY, RIGHT THORACOSCOPY VIDEO ASSISTED LOBECTOMY, MEDIASTINAL LYMPH NODE DISECTION;  Surgeon: Elder Ham MD;  Location:  [facility];  Service:    • VENOUS ACCESS DEVICE (PORT) INSERTION N/A 7/20/2018    Procedure: PORT PLACEMENT;  Surgeon: Elder Ham MD;  Location:  [facility];  Service: Cardiothoracic       No Known Allergies    Family History and Social History reviewed and changed as necessary      REVIEW OF SYSTEM:   Review of Systems   Constitutional: Negative for appetite change, chills, diaphoresis, fever and unexpected weight change. Positive for mild fatigue.  HENT:   Negative for mouth sores, sore throat and trouble swallowing.    Eyes: Negative for icterus.   Respiratory: Negative for cough, hemoptysis and shortness of breath.    Cardiovascular: Negative for chest pain, leg swelling and palpitations.  "  Gastrointestinal: Negative for abdominal distention, abdominal pain, blood in stool, constipation, diarrhea, nausea and vomiting.   Endocrine: Negative for hot flashes.   Genitourinary: Negative for bladder incontinence, difficulty urinating, dysuria, frequency and hematuria.    Musculoskeletal: Negative for gait problem, neck pain and neck stiffness.  positive for mid back pain.  Skin: Negative for rash.   Neurological: Negative for dizziness, gait problem, headaches, light-headedness and numbness.   Hematological: Negative for adenopathy. Does not bruise/bleed easily.   Psychiatric/Behavioral: Negative for depression. The patient is not nervous/anxious.    All other systems reviewed and are negative.       PHYSICAL EXAM    Vitals:    10/05/18 0854   BP: 92/71   Pulse: 110   Resp: 18   Temp: 97.5 °F (36.4 °C)   SpO2: 95%   Weight: 59.4 kg (131 lb)   Height: 167.6 cm (66\")     Constitutional: Appears well-developed and well-nourished. No distress.   ECOG: (0) Fully active, able to carry on all predisease performance without restriction  HENT:   Head: Normocephalic.   Mouth/Throat: Oropharynx is clear and moist.   Eyes: Conjunctivae are normal. Pupils are equal, round, and reactive to light. No scleral icterus.   Neck: Neck supple. No JVD present.   Cardiovascular: Normal rate, regular rhythm and normal heart sounds.    Pulmonary/Chest: Breath sounds normal. No respiratory distress.   Abdominal: Soft. Exhibits no distension. There is no tenderness.    Musculoskeletal:Exhibits no edema, tenderness or deformity.   Neurological: Alert and oriented to person, place, and time. Exhibits normal muscle tone.   Skin: No ecchymosis, no petechiae and no rash noted. Not diaphoretic. No cyanosis.    Psychiatric: Normal mood and affect.   Vitals reviewed.  Labs reviewed.    ASSESSMENT & PLAN:    1. History of stage I lung cancer with shekhar recurrence treated with concurrent chemotherapy and radiation  2. Shekhar " recurrence  3. Bone metastasis  4. Cancer related pain    Discussion:  Patient has had good response to the treated area in the anterior mediastinal lymph nodes and right lower lobe but unfortunately now showing osseous metastatic involvement on current CT scan and PET/CT.  She is having mid back pain.  I will restage her with MRI of the brain and also get MRI of the thoracic spine in light of her pain make sure there is no instability.  We will also get her to interventional radiology to see if we can get a biopsy of the thoracic metastatic disease and would want to send that off for Caris MI testing to help in further decision making for treatment.  In the event that she does not have any actionable targets we plan on starting therapy with Opdivo in about 3 weeks.  If we are unable to get biopsy of thoracic mets then we will send off the level VII lymph node from previous biopsy in June.  We will get her to our pharmacy staff for chemotherapy education.  We will see her back in 3 weeks for follow-up.  Dr. Patterson also met with the patient and her  today to go over results of her scans and plan of care.  All questions were answered to their satisfaction.  The patient does understand the palliative nature of our treatment now that her disease is metastatic.  She does have pain medicine home to take for her back pain.    I spent 25 minutes with the patient. I spent > 50% percent of this time counseling and discussing prognosis, diagnostic testing, evaluation, current status and management.    Renu Sanders, APRN    10/05/2018

## 2018-10-19 NOTE — DISCHARGE INSTR - ACTIVITY
Rest quietly at home today.  You may resume light activity tomorrow as tolerated.  You may shower and remove the bandage tomorrow.

## 2018-10-19 NOTE — NURSING NOTE
Patient placed on cardiac monitors and initial images taken and reviewed by Dr. Cain. Vitals stable. A tissue and cytology sample were able to be obtained from the bone and reviewed by pathology in the room. There was not enough sample for CARIS testing. The patient tolerated well. Report to JUHI.

## 2018-10-19 NOTE — NURSING NOTE
Returned to room. Tolerated procedure well. Band aid dressing clean, dry, and intact ant Lt hip. HOB elev and provided lunch tray.

## 2018-10-19 NOTE — NURSING NOTE
Provided printed and oral discharge instructions. Verbalizes understanding. Discharged per wheelchair to front entrance with sister driving.

## 2018-10-25 NOTE — PROGRESS NOTES
FOLLOW UP NOTE    PATIENT:                                                      Moriah Qiu  MEDICAL RECORD #:                        [id number]  :                                                          1970  COMPLETION DATE:    2018  DIAGNOSIS:     Recurrent lung cancer    BRIEF HISTORY:    Mrs. Qiu is a very pleasant 48-year-old female who underwent right lower lobectomy on 9/15/2016 for stage I A moderately differentiated adenocarcinoma of the right lung.  She was found to have recurrence on PET scan and underwent combined chemoradiotherapy in our department of 60 Gray in 30 fractions completing 2018.  Toward the end of treatment she complained of back pain.  She was scheduled for PET scan on 10/3/2018.  It revealed various sclerotic foci within the osseous structures that no abnormal hypermetabolism associated with these lesions except in the sacral prominence with maximum SUV of 3.2.    ASHER Fan ordered MRI of the thoracic spine that showed multifocal areas of abnormal hypoenhancement and low signal throughout the visualized cervicothoracic osseous structures concerning for diffuse metastatic involvement.  Within the C4 vertebral body there was near entire involvement and replacement of the normal bone marrow fat signal.  There are other dominant lesions at T3 vertebral body, posterior T6 and T11 inferiorly.  There was nothing to suggest cord involvement.  MRI of the brain was negative for metastatic disease.  Mrs. Qiu is scheduled for chemotherapy tomorrow.  She describes pain in the midthoracic region.  She has an occasional pain in the neck when asked.        MEDICATIONS: Medication reconciliation for the patient was reviewed and confirmed in the electronic medical record.    Review of Systems   Constitutional: Positive for fatigue.   Musculoskeletal: Positive for back pain.   All other systems reviewed and are negative.      KPS 80%    Physical Exam   Constitutional: She  "appears well-developed and well-nourished.   Neck: Neck supple.   Cardiovascular: Normal rate, regular rhythm and normal heart sounds.    Pulmonary/Chest: Effort normal and breath sounds normal.   Nursing note and vitals reviewed.      VITAL SIGNS:   Vitals:    10/25/18 1017   BP: 108/71   Pulse: 112   Resp: 16   Temp: 98 °F (36.7 °C)   Weight: 60.1 kg (132 lb 8 oz)   Height: 167.6 cm (65.98\")   PainSc: 0-No pain       The following portions of the patient's history were reviewed and updated as appropriate: allergies, current medications, past family history, past medical history, past social history, past surgical history and problem list.         There are no diagnoses linked to this encounter.     IMPRESSION:  Mrs. Qiu has metastatic disease from lung to bone.  She is completed 60 Gray of radiotherapy with concurrent chemotherapy to the primary recurrent lung cancer.    RECOMMENDATIONS: I reviewed the results of all the studies with Mrs. Qiu and her .  I then spoke with ASHER Acosta.  Mrs. Qiu is to start a new chemotherapy tomorrow.  They're going to proceed with chemotherapy and adjust her pain medicines.  At this time she does not have a large burden of disease.  I discussed with her the signs and symptoms of tingling or weakness in hands, arms, legs or feet or increased pain that could be representative of progressive bone disease.  If she develops any of this she will return to see me otherwise we will wait until she is finished chemotherapy and treat if needed.  She knows to call any time.      Vi Lozano MD    Errors in dictation may reflect use of voice recognition software and not all errors in transcription may have been detected prior to signing.  "

## 2018-10-26 NOTE — PROGRESS NOTES
CHIEF COMPLAINT:    1. History of stage I lung cancer with subsequent gagandeep recurrence                                              Problem List:     Adenocarcinoma, lung, right (CMS/HCC)    8/10/2016 Initial Diagnosis     Adenocarcinoma of right lung, lower lobe, stage 1A status post right lower lobectomy September 2016.  On serial CT had new right upper lobe abnormality that was spiculated but actually decreased in size on May 2018 CT but nonetheless underwent bronchoscopy which showed level VII node involvement on 6/5/18.  No clear-cut disease at the left lower lobe stump.  CBC and CMP unremarkable.  Saw me for the first time 6/14/18.  This was completely asymptomatic.  I recommended PET scan and MRI of the brain.  Results are below with no extrathoracic metastasis.  I discussed the results of her 6/26/18 PET and MRI brain on 6/28/18 with Dr. Grey and he does not think that she would be eligible for resection.  Likewise, this known level VII node involvement on biopsy is under represented by the PET and we could not be certain that he had thoroughly treated this area.  Hence, he and I came to the mutual consensus that definitive chemotherapy and radiation would be her best bet at eradication though certainly not guarantee by any stretch.  She was subsequently educated on cisplatin and Alimta plus radiation and port will be placed by Dr. Ham.         6/26/2018 Imaging     Pet:  Impression:     1. Partially cavitary metabolic paramedian nodule posterior right lung  is concerning for second primary lesion or metastasis.  2. Hypermetabolic gagandeep activity is stable nonenlarged lymph nodes. With  the inflammatory changes in the upper lobes these might be reactive but  followup should be considered.  3. Nonspecific tiny focus of hypermetabolic activity posterior to the  thyroid gland. Attention on followup recommended     MRI brain negative for metastasis    CMP and CBC unremarkable save for alkaline phosphatase  of 122         8/3/2018 - 9/14/2018 Chemotherapy     OP LUNG PEMEtrexed / CISplatin + XRT           8/9/2018 - 9/26/2018 Radiation     Radiation OncologyTreatment Course:  Moriah Qiu received 6000 cGy in 30 fractions to right lung via External Beam Radiation - EBRT.         9/17/2018 Progression     CT chest IMPRESSION:  1. Continued increase in size of the paramedian subsolid lesion located  in what is the medial segment middle lobe (unusual location relative to  the spine is due to prior right lower lobectomy).  2. Grossly unchanged size of the mediastinal and right hilar lymph  nodes.  3. New sub solid nodule in the posterior aspect of the right upper lobe.  4. New osseous sclerotic metastases involving the imaged spine.           10/3/2018 Imaging     PET/CT IMPRESSION:  Mixed appearance of response to therapy with decreased  hypermetabolism within the anterior mediastinal lymph nodes and  decreased activity within the right lower lobe medial segment pulmonary  nodule indicating treatment response however noted increased sclerosis  and abnormal hypermetabolism within the sacral prominence and body  indicating progression of osseous metastatic involvement. Various other  sites of scattered sclerotic foci within the osseous structures however  no other sites of abnormal hypermetabolism associated with these  lesions.         10/19/2018 Imaging     MRI Brain IMPRESSION:  1.  No acute intracranial abnormality specifically, no evidence for  abnormal enhancement to suggest intracranial metastasis.  2.  Minimal amount of T2 and FLAIR increased signal abnormality within  the periventricular and deep white matter may represent early chronic  small vessel ischemic disease and/or sequela of migraines, however, no  associated abnormal enhancement or restriction.  MRI Thoracic spine  IMPRESSION:  Multilevel areas of abnormal hypoenhancing lesions  throughout the cervicothoracic spine as detailed above,  greatest  involvement at T3, T6 and T11 levels, without pathologic compression  deformity or retropulsion. The findings are consistent with osseous  metastasis, however, no intracanalicular involvement specifically, no  intramedullary or intradural abnormal enhancement to suggest thoracic  cord involvement or neuroforaminal/spinal canal stenosis. No significant  interval change in mid thoracic syrinx appearance from 2016 comparison  examination.            HISTORY OF PRESENT ILLNESS:  The patient is a 48 y.o. female, here for follow up on management of adenocarcinoma of the lung. Patient is scheduled to receive cycle 1 of Opdivo today for osseous metastatic involvement on current CT scan and PET/CT. She continues to have nausea and emesis daily. Compazine helps relieve nausea. She reports a migraine headache that lasted 6-7 hours this morning. She had photosensitivity and nausea and vomiting during the headache. She states the pain improved with Goody's headache powder. The pain had been a 10/10 on the pain scale this am and is currently 5/10. She denies any light sensitivity or nausea/vomiting at this time.  She still has a productive cough and some dyspnea with exertion and wheezing but denies any hemoptysis. No unintentional weight loss, fevers or chills.       Past Medical History:   Diagnosis Date   • Back pain    • COPD (chronic obstructive pulmonary disease) (CMS/HCC)     emphysema   • Esophagus hernia    • Gall stone    • GERD (gastroesophageal reflux disease)    • History of stomach ulcers    • Lung cancer (CMS/HCC)    • Osteoarthritis (arthritis due to wear and tear of joints)    • Osteoporosis    • Uterine cancer (CMS/HCC) 1997    ovarian 1997   • Wears dentures      Past Surgical History:   Procedure Laterality Date   • BILATERAL OOPHORECTOMY     • BRONCHOSCOPY N/A 8/4/2016    Procedure: NAVIGATIONAL BRONCHOSCOPY;  Surgeon: Elder Tompkins MD;  Location: [facility] ENDOSCOPY;  Service:    • BRONCHOSCOPY N/A  8/4/2016    Procedure: BRONCHOSCOPY WITH ENDOBRONCHIAL ULTRASOUND;  Surgeon: Elder Tompkins MD;  Location:  [facility] ENDOSCOPY;  Service:    • BRONCHOSCOPY N/A 6/5/2018    Procedure: BRONCHOSCOPY WITH ENDOBRONCHIAL ULTRASOUND WITH FLUORO;  Surgeon: Kingston Carrasquillo MD;  Location:  [facility] ENDOSCOPY;  Service: Pulmonary   • COLONOSCOPY      2016   • GALLBLADDER SURGERY  2016   • HYSTERECTOMY  1997   • THORACOSCOPY Right 9/15/2016    Procedure: BRONOSCOPY, RIGHT THORACOSCOPY VIDEO ASSISTED LOBECTOMY, MEDIASTINAL LYMPH NODE DISECTION;  Surgeon: Elder Ham MD;  Location:  [facility] OR;  Service:    • VENOUS ACCESS DEVICE (PORT) INSERTION N/A 7/20/2018    Procedure: PORT PLACEMENT;  Surgeon: Elder Ham MD;  Location:  [facility] HYBRID OR 15;  Service: Cardiothoracic       No Known Allergies    Family History and Social History reviewed and changed as necessary      REVIEW OF SYSTEM:   Review of Systems   Constitutional: Negative for appetite change, chills, diaphoresis, fever and unexpected weight change. Positive for mild fatigue.  HENT:   Negative for mouth sores, sore throat and trouble swallowing.    Eyes: Negative for icterus.   Respiratory: Negative for hemoptysis.    Cardiovascular: Negative for chest pain, leg swelling and palpitations.   Gastrointestinal: Negative for abdominal distention, abdominal pain, blood in stool, constipation, diarrhea, nausea and vomiting.   Endocrine: Negative for hot flashes.   Genitourinary: Negative for bladder incontinence, difficulty urinating, dysuria, frequency and hematuria.    Musculoskeletal: Negative for gait problem, neck pain and neck stiffness.  positive for mid back pain.  Skin: Negative for rash.   Neurological: Negative for dizziness, gait problem, headaches, light-headedness and numbness.   Hematological: Negative for adenopathy. Does not bruise/bleed easily.   Psychiatric/Behavioral: Negative for depression. The patient is not nervous/anxious.    All other  "systems reviewed and are negative.       PHYSICAL EXAM    Vitals:    10/26/18 1331   BP: 109/68   Pulse: 104   Resp: 12   Temp: 97.9 °F (36.6 °C)   TempSrc: Temporal Artery    Weight: 59.9 kg (132 lb)   Height: 167.6 cm (66\")     Constitutional: Appears well-developed and well-nourished. No distress.   ECOG: (0) Fully active, able to carry on all predisease performance without restriction  HENT:   Head: Normocephalic.   Mouth/Throat: Oropharynx is clear and moist.   Eyes: Conjunctivae are normal. Pupils are equal, round, and reactive to light. No scleral icterus.   Neck: Neck supple. No JVD present.   Cardiovascular: Normal rate, regular rhythm and normal heart sounds.    Pulmonary/Chest: Breath sounds normal. No respiratory distress.   Abdominal: Soft. Exhibits no distension. There is no tenderness.    Musculoskeletal:Exhibits no edema, tenderness or deformity.   Neurological: Alert and oriented to person, place, and time. Exhibits normal muscle tone.   Skin: No ecchymosis, no petechiae and no rash noted. Not diaphoretic. No cyanosis.    Psychiatric: Normal mood and affect.   Vitals reviewed.  Labs reviewed.    ASSESSMENT & PLAN:    1. History of stage I lung cancer with shekhar recurrence treated with concurrent chemotherapy and radiation  2. Shekhar recurrence  3. Bone metastasis  4. Cancer related pain    Discussion:  Patient had good response to the treated area in the anterior mediastinal lymph nodes and right lower lobe but unfortunately showed osseous metastatic involvement on current CT scan and PET/CT.  She is scheduled to start cycle 1 Opdivo today. I discussed risk versus benefits and most common side effects to be expected with the Opdivo. Patient verbalizes understanding and is ready to proceed with treatment.      I spent 25 minutes with the patient. I spent 17 minutes with patient counseling and discussing prognosis, evaluation, new medications,  current status and management.    Kaylyn Young, " APRN  10/26/2018

## 2018-10-26 NOTE — PLAN OF CARE
Outpatient Infusion • [address] [facility] • [address] • [place] [id number] • [phone]      CHEMOTHERAPY EDUCATION SHEET    NAME:  Moriah Qiu      : 1970           DATE: 10/26/18    Booklets Given: Chemotherapy and You []  Eating Hints []    Sexuality/Fertility Books []     Chemotherapy/Biotherapy Education Sheets: (list all that apply)  Nivolumab (Opdivo)                                                                                                                                                                Chemotherapy Regimen:  Nivolumab (Opdivo)    TOPICS EDUCATION PROVIDED EDUCATION REINFORCED COMMENTS   ANEMIA:  role of RBC, cause, s/s, ways to manage, role of transfusion [x] [] RBC carry oxygen, may experience additional fatigue   THROMBOCYTOPENIA:  role of platelet, cause, s/s, ways to prevent bleeding, things to avoid, when to seek help [x] [] Platelets help the body clot, educated on signs and symptoms of bleeding (unexplained brusing etc.)   NEUTROPENIA:  role of WBC, cause, infection precautions, s/s of infection, when to call MD [x] [] WBC fight infection, fever considered temp >100.4 degrees F   NUTRITION & APPETITE CHANGES:  importance of maintaining healthy diet & weight, ways to manage to improve intake, dietary consult, exercise regimen [x] [] Important to receive adequate nutrition to fight the cancer and help body recover    DIARRHEA:  causes, s/s of dehydration, ways to manage, dietary changes, when to call MD [x] [] Educated on OTC options such as immodium, when to call    CONSTIPATION:  causes, ways to manage, dietary changes, when to call MD [x] [] Educated on OTC options such as docusate, miralax, etc   NAUSEA & VOMITING:  cause, use of antiemetics, dietary changes, when to call MD [x] [] Discussed importance of staying hydrated    MOUTH SORES:  causes, oral care, ways to manage [x] [] Reviewed salt/water/baking soda solution   ALOPECIA:  cause, ways to manage,  resources [x] [] Discussed this may be a possibility, although unlike with today's treatment   INFERTILITY & SEXUALITY:  causes, fertility preservation options, sexuality changes, ways to manage, importance of birth control [x] [] Two forms of protection, can make it's way into bodily fluids   NERVOUS SYSTEM CHANGES:  causes, s/s, neuropathies, cognitive changes, ways to manage [x] [] Peripheral neuropathies, fatigue, etc. Recommend exercise   PAIN:  causes, ways to manage [x] [] Over the counter options first, let us know if pain does not subside   SKIN & NAIL CHANGES:  cause, s/s, ways to manage [x] [] Rash may develop, treat with hydrocortisone unless severe, then contact us   ORGAN TOXICITIES:  cause, s/s, need for diagnostic tests, labs, when to notify MD [x] [] Hepatitis, pneumonitis, nephritis, and s/sx   SURVIVORSHIP:  distress, distress assessment, secondary malignancies, early/late effects, follow-up, social issues, social support [x] []    HOME CARE:  use of spill kits, storing of PO chemo, how to manage bodily fluids [x] [] Discussed possibility of drug finding it's way into bodily fluids, using gloves when cleaning bodily fluids   MISCELLANEOUS:  drug interactions, administration, vesicant, et [x] [] Patient has not receive flu shot yet this year      Referrals:    n/a    Notes:   Patient was accompanied by her  for this encounter. We discussed the patient wanted to get a flu shot, but her local pharmacy advised her not to. Explained why it may not be as effective as it would otherwise be (due to low WBC), but it would still be acceptable to get one. No additional questions were asked.     Thanks,  Harry Chi, PharmD  Pharmacy Resident  10/26/2018  1:16 PM

## 2018-12-11 NOTE — PROGRESS NOTES
12/11/2018  Patient Information  Moriah Qiu                                                                                           [address]  [place] [address]   1970  'PCP/Referring Physician'  Darline Velasco MD  [phone]  No ref. provider found    Chief Complaint   Patient presents with   • Lung Cancer   • Follow-up     follow up regardging port placement.       History of Present Illness:   Patient is a 48-year-old  female with history of tobacco abuse, COPD, lung nodule and bone metastases who presents to office today for follow-up and evaluation of MediPort placement from 7/20/18.  The patient had a right VATS and right lower lobectomy performed on 9/15/16 secondary to stage IA adenocarcinoma with right upper lobe recurrence, thus Mediport insertion.  The patient is followed closely by her oncologist, Dr. Patterson, who she will follow up with on 12/20/18.  She is currently using her Mediport for injections of Opdivo ×2 years per Dr. Patterson' recommendation.  The patient states that she is tolerating her injections well.  She denies any nausea, vomiting, fever, chills, shortness of breath, chest pain, difficulty with ambulation, hemoptysis or weight loss.  She further denies any new or worsening lymphadenopathy or bone pain.  The patient continues to smoke 1 pack per day.      Patient Active Problem List   Diagnosis   • Lung nodule   • Osteoporosis   • Syringomyelia (CMS/HCC)   • Adenocarcinoma, lung, right (CMS/HCC)   • Osteoarthritis (arthritis due to wear and tear of joints)   • COPD (chronic obstructive pulmonary disease) (CMS/HCC)   • Allergic rhinitis   • Tobacco use   • Bone metastases (CMS/HCC)   • Encounter for long-term (current) use of high-risk medication     Past Medical History:   Diagnosis Date   • Back pain    • COPD (chronic obstructive pulmonary disease) (CMS/HCC)     emphysema   • Esophagus hernia    • Gall stone    • GERD (gastroesophageal reflux disease)    • History  of stomach ulcers    • Lung cancer (CMS/HCC)    • Osteoarthritis (arthritis due to wear and tear of joints)    • Osteoporosis    • Uterine cancer (CMS/HCC) 1997    ovarian 1997   • Wears dentures      Past Surgical History:   Procedure Laterality Date   • BILATERAL OOPHORECTOMY     • COLONOSCOPY      2016   • GALLBLADDER SURGERY  2016   • HYSTERECTOMY  1997       Current Outpatient Medications:   •  cetirizine (zyrTEC) 10 MG tablet, Take 10 mg by mouth Daily., Disp: , Rfl:   •  Cholecalciferol (VITAMIN D3) 1000 UNITS capsule, Take 1 capsule by mouth daily., Disp: , Rfl:   •  famotidine (PEPCID) 40 MG tablet, Take 40 mg by mouth daily., Disp: , Rfl: 0  •  fluticasone (FLONASE) 50 MCG/ACT nasal spray, 2 sprays into each nostril Daily., Disp: , Rfl: 1  •  folic acid (FOLVITE) 1 MG tablet, TAKE 1 TABLET BY MOUTH DAILY, START AT LEAST 7 DAYS PRIOR TO CHEMO UNTIL AT LEAST 3 WEEKS AFTER ALL CHEMO, Disp: 30 tablet, Rfl: 4  •  HYDROcodone-acetaminophen (NORCO)  MG per tablet, Take 1 tablet by mouth every 6 (six) hours as needed for severe pain (7-10)., Disp: , Rfl:   •  Magic mouthwash Radonc, Swish and swallow 10 mL 4 (Four) Times a Day (Before Meals & at Bedtime) for sore throat and swallowing., Disp: 480 mL, Rfl: 5  •  montelukast (SINGULAIR) 10 MG tablet, Take 10 mg by mouth Daily., Disp: , Rfl: 1  •  ondansetron ODT (ZOFRAN-ODT) 4 MG disintegrating tablet, Take 1 tablet by mouth every 8 (eight) hours as needed (for nausea). (Patient taking differently: Take 4 mg by mouth Every 8 (Eight) Hours As Needed for Nausea (for nausea).), Disp: 30 tablet, Rfl: 0  •  pantoprazole (PROTONIX) 40 MG EC tablet, , Disp: , Rfl: 0  •  prochlorperazine (COMPAZINE) 10 MG tablet, Take 1 tablet by mouth Every 6 (Six) Hours As Needed for Nausea or Vomiting., Disp: 60 tablet, Rfl: 1  •  Pseudoephedrine-Acetaminophen (SM NON-ASPRIN SINUS PO), Take  by mouth 5 (Five) Times a Day., Disp: , Rfl:   •  RA CALCIUM 600 600 MG tablet, Take 600 mg  by mouth 2 (two) times a day with meals., Disp: , Rfl: 0  •  RA SALINE NASAL SPRAY 0.65 % nasal spray, 1 spray into each nostril Daily As Needed., Disp: , Rfl: 1  No Known Allergies  Social History     Socioeconomic History   • Marital status:      Spouse name: NANCY   • Number of children: 3   • Years of education: Not on file   • Highest education level: Not on file   Social Needs   • Financial resource strain: Not on file   • Food insecurity - worry: Not on file   • Food insecurity - inability: Not on file   • Transportation needs - medical: Not on file   • Transportation needs - non-medical: Not on file   Occupational History   • Occupation: NOT EMPLOYED      Comment: Previous Contractor for the [employer]   Tobacco Use   • Smoking status: Current Every Day Smoker     Packs/day: 1.00     Years: 28.00     Pack years: 28.00     Types: Cigarettes   • Smokeless tobacco: Never Used   Substance and Sexual Activity   • Alcohol use: No   • Drug use: No   • Sexual activity: Defer   Other Topics Concern   • Not on file   Social History Narrative    Lives in [place], [place] with .     Adelaide. Sister with today     Family History   Problem Relation Age of Onset   • Cancer Mother    • Alzheimer's disease Father    • Osteoarthritis Other      Review of Systems   Constitution: Positive for weakness, malaise/fatigue and night sweats. Negative for chills, diaphoresis, fever, weight gain and weight loss.   HENT: Positive for congestion and hearing loss. Negative for ear pain, nosebleeds and sore throat.    Eyes: Negative for blurred vision and double vision.   Cardiovascular: Positive for claudication, dyspnea on exertion and near-syncope. Negative for chest pain, leg swelling, palpitations and syncope.   Respiratory: Positive for cough and shortness of breath. Negative for hemoptysis and wheezing.    Hematologic/Lymphatic: Bruises/bleeds easily.   Skin: Negative for itching, poor wound healing and rash.  "  Musculoskeletal: Positive for back pain, joint pain, muscle cramps and neck pain. Negative for arthritis, falls, joint swelling and muscle weakness.   Gastrointestinal: Positive for constipation, diarrhea and vomiting (vomiting black. states looks like coffee grounds. pt doesnt drink coffee). Negative for abdominal pain, dysphagia, hematochezia and nausea.   Genitourinary: Positive for frequency. Negative for hematuria, hesitancy, incomplete emptying and urgency.   Neurological: Positive for dizziness, headaches, light-headedness and loss of balance. Negative for numbness, seizures and tremors.   Psychiatric/Behavioral: Negative for depression and suicidal ideas. The patient is not nervous/anxious.    Allergic/Immunologic: Positive for environmental allergies. Negative for HIV exposure.     Vitals:    12/11/18 1350   BP: 110/50   BP Location: Right arm   Patient Position: Sitting   Pulse: 96   Temp: 97.4 °F (36.3 °C)   SpO2: 90%   Weight: 61.3 kg (135 lb 3.2 oz)   Height: 170.2 cm (67\")      Physical Exam:  Gen - NAD, pleasant, cooperative  CV - Regular rate and rhythm, no murmur gallop or rub  Pulm - Lungs clear to auscultation without wheeze or rhonchi   GI - Soft, normoactive bowel sounds, non-tender  Ext - Without edema  Skin - Warm and dry     Labs/Imaging: 10/19/18 CT biopsy of left ilium bone  FINDINGS: Informed written consent was obtained from the patient prior  to beginning. The procedure was discussed in detail including potential  risks of bleeding and infection as well as how bleeding complications  are managed. The patient indicated understanding and agreed  to proceed.     The patient was prone on the CT table and a planning scan of the pelvis  was performed. The access point over the left ilium was chosen by the  radiologist, grid was placed by the technologist and the skin was marked  by the radiologist.     With the patient in unchanged position, the skin overlying the access  point was numbed " with 1% lidocaine using a 3 cm 25-gauge needle as was  the periosteum. Then using a [employer] biopsy set, several samples of the  sclerotic lesion in the left ilium were obtained under meticulous  imaging guidance. Pathology was present. The needle was removed.     The patient tolerated the procedure very well and there were no  immediate complications.     IMPRESSION:  CT-guided biopsy of the left ilium.    Assessment/Plan:   Patient is a 48-year-old  female with history of tobacco abuse, COPD, lung nodule and bone metastases who presents to office today for follow-up and evaluation of MediPort placement from 7/20/18.  Since the patient was last seen she has been found to have bone metastases, for which she is receiving treatment from her oncologist, Dr. Patterson, who she follows up with next on 12/20/18.  The patient and I had a 3-5 minute conversation about the importance of tobacco cessation.  She'll need a follow-up with our office in 6 months with CT scan of the chest.  If the patient develops any questions, concerns or acutely worsening symptoms, she may call our office or present to the nearest emergency department immediately.    Patient Active Problem List   Diagnosis   • Lung nodule   • Osteoporosis   • Syringomyelia (CMS/HCC)   • Adenocarcinoma, lung, right (CMS/HCC)   • Osteoarthritis (arthritis due to wear and tear of joints)   • COPD (chronic obstructive pulmonary disease) (CMS/HCC)   • Allergic rhinitis   • Tobacco use   • Bone metastases (CMS/HCC)   • Encounter for long-term (current) use of high-risk medication

## 2018-12-20 NOTE — TELEPHONE ENCOUNTER
[facility]anta from out patient infusion called to report that Ms Qiu was there for her infusion but they could not use her port because it looked like part of her port was sticking through her skin. They wanted her to be seen in our office today if possible. I called and spoke to Dr Ham, he said to have her come to the office today to see Macario Mahmood PA-C. I called infusion to let them know we would see Ms Qiu today.

## 2018-12-20 NOTE — PROGRESS NOTES
12/20/2018  Patient Information  Moriah Qiu                                                                                          [address]  [place] [address]   1970  'PCP/Referring Physician'  Darline Velasco MD  [phone]  No ref. provider found    Chief Complaint   Patient presents with   • Follow-up     Follow up to check port site,complains of an area that looks like it is sticking out of the skin.   • Lung Cancer   • Wound Check       History of Present Illness:  Patient is a 48-year-old  female with history of tobacco abuse, COPD, lung nodule and bone metastasis who presents to office for evaluation of Mediport.  The patient is currently receiving Opdivo injections via her port.  She is followed closely by her oncologist, Dr. Patterson.  She was recently seen on 12/11/18, and denies any changes since her last visit.  She further denies any fever, chills, nausea, vomiting or night sweats.  She was scheduled to have her chemotherapy injection today, but it was noted by one of the nurses that her Mediport was protruding through her skin.  She is in office today for evaluation of this port.    Patient Active Problem List   Diagnosis   • Lung nodule   • Osteoporosis   • Syringomyelia (CMS/HCC)   • Adenocarcinoma, lung, right (CMS/HCC)   • Osteoarthritis (arthritis due to wear and tear of joints)   • COPD (chronic obstructive pulmonary disease) (CMS/HCC)   • Allergic rhinitis   • Tobacco use   • Bone metastases (CMS/HCC)   • Encounter for long-term (current) use of high-risk medication     Past Medical History:   Diagnosis Date   • Back pain    • COPD (chronic obstructive pulmonary disease) (CMS/HCC)     emphysema   • Esophagus hernia    • Gall stone    • GERD (gastroesophageal reflux disease)    • History of stomach ulcers    • Lung cancer (CMS/HCC)    • Osteoarthritis (arthritis due to wear and tear of joints)    • Osteoporosis    • Uterine cancer (CMS/HCC) 1997    ovarian 1997   • Wears  dentures      Past Surgical History:   Procedure Laterality Date   • BILATERAL OOPHORECTOMY     • BRONCHOSCOPY N/A 8/4/2016    Procedure: NAVIGATIONAL BRONCHOSCOPY;  Surgeon: Elder Tompkins MD;  Location:  [facility];  Service:    • BRONCHOSCOPY N/A 8/4/2016    Procedure: BRONCHOSCOPY WITH ENDOBRONCHIAL ULTRASOUND;  Surgeon: Elder Tompkins MD;  Location:  [facility];  Service:    • BRONCHOSCOPY N/A 6/5/2018    Procedure: BRONCHOSCOPY WITH ENDOBRONCHIAL ULTRASOUND WITH FLUORO;  Surgeon: Kingston Carrasquillo MD;  Location:  [facility];  Service: Pulmonary   • COLONOSCOPY      2016   • GALLBLADDER SURGERY  2016   • HYSTERECTOMY  1997   • THORACOSCOPY Right 9/15/2016    Procedure: BRONOSCOPY, RIGHT THORACOSCOPY VIDEO ASSISTED LOBECTOMY, MEDIASTINAL LYMPH NODE DISECTION;  Surgeon: Elder Ham MD;  Location:  [facility];  Service:    • VENOUS ACCESS DEVICE (PORT) INSERTION N/A 7/20/2018    Procedure: PORT PLACEMENT;  Surgeon: Elder Ham MD;  Location:  [facility];  Service: Cardiothoracic       Current Outpatient Medications:   •  cetirizine (zyrTEC) 10 MG tablet, Take 10 mg by mouth Daily., Disp: , Rfl:   •  Cholecalciferol (VITAMIN D3) 1000 UNITS capsule, Take 1 capsule by mouth daily., Disp: , Rfl:   •  famotidine (PEPCID) 40 MG tablet, Take 40 mg by mouth daily., Disp: , Rfl: 0  •  fluticasone (FLONASE) 50 MCG/ACT nasal spray, 2 sprays into each nostril Daily., Disp: , Rfl: 1  •  folic acid (FOLVITE) 1 MG tablet, TAKE 1 TABLET BY MOUTH DAILY, START AT LEAST 7 DAYS PRIOR TO CHEMO UNTIL AT LEAST 3 WEEKS AFTER ALL CHEMO, Disp: 30 tablet, Rfl: 4  •  HYDROcodone-acetaminophen (NORCO)  MG per tablet, Take 1 tablet by mouth every 6 (six) hours as needed for severe pain (7-10)., Disp: , Rfl:   •  Magic mouthwash Radonc, Swish and swallow 10 mL 4 (Four) Times a Day (Before Meals & at Bedtime) for sore throat and swallowing., Disp: 480 mL, Rfl: 5  •  montelukast (SINGULAIR) 10 MG tablet,  Take 10 mg by mouth Daily., Disp: , Rfl: 1  •  ondansetron ODT (ZOFRAN-ODT) 4 MG disintegrating tablet, Take 1 tablet by mouth every 8 (eight) hours as needed (for nausea). (Patient taking differently: Take 4 mg by mouth Every 8 (Eight) Hours As Needed for Nausea (for nausea).), Disp: 30 tablet, Rfl: 0  •  pantoprazole (PROTONIX) 40 MG EC tablet, , Disp: , Rfl: 0  •  prochlorperazine (COMPAZINE) 10 MG tablet, Take 1 tablet by mouth Every 6 (Six) Hours As Needed for Nausea or Vomiting., Disp: 60 tablet, Rfl: 1  •  Pseudoephedrine-Acetaminophen (SM NON-ASPRIN SINUS PO), Take  by mouth 5 (Five) Times a Day., Disp: , Rfl:   •  RA CALCIUM 600 600 MG tablet, Take 600 mg by mouth 2 (two) times a day with meals., Disp: , Rfl: 0  •  RA SALINE NASAL SPRAY 0.65 % nasal spray, 1 spray into each nostril Daily As Needed., Disp: , Rfl: 1  No Known Allergies  Social History     Socioeconomic History   • Marital status:      Spouse name: NANCY   • Number of children: 3   • Years of education: Not on file   • Highest education level: Not on file   Social Needs   • Financial resource strain: Not on file   • Food insecurity - worry: Not on file   • Food insecurity - inability: Not on file   • Transportation needs - medical: Not on file   • Transportation needs - non-medical: Not on file   Occupational History   • Occupation: NOT EMPLOYED      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     Adelaide. Sister with today     Family History   Problem Relation Age of Onset   • Cancer Mother    • Alzheimer's disease Father    • Osteoarthritis Other      Review of  "Systems   Constitution: Negative for chills, fever, malaise/fatigue, night sweats and weight loss.   HENT: Positive for congestion and hearing loss. Negative for odynophagia and sore throat.    Cardiovascular: Positive for claudication, dyspnea on exertion and near-syncope. Negative for chest pain, leg swelling, orthopnea and palpitations.   Respiratory: Positive for cough and shortness of breath. Negative for hemoptysis.    Endocrine: Negative for cold intolerance, heat intolerance, polydipsia, polyphagia and polyuria.   Hematologic/Lymphatic: Does not bruise/bleed easily.   Skin: Negative for itching and rash.   Musculoskeletal: Positive for back pain, joint pain, muscle cramps and neck pain. Negative for joint swelling and myalgias.   Gastrointestinal: Positive for constipation, diarrhea and vomiting. Negative for abdominal pain, hematemesis, hematochezia, melena and nausea.   Genitourinary: Positive for frequency. Negative for dysuria and hematuria.   Neurological: Positive for dizziness, headaches, light-headedness and loss of balance. Negative for focal weakness, numbness and seizures.   Psychiatric/Behavioral: Negative for suicidal ideas.   All other systems reviewed and are negative.    Vitals:    12/20/18 1038   BP: 97/67   BP Location: Right arm   Patient Position: Sitting   Pulse: 102   Temp: 97.9 °F (36.6 °C)   SpO2: (!) 87%   Weight: 58.5 kg (129 lb)   Height: 167.6 cm (66\")      Physical Exam:  Gen - NAD, pleasant, cooperative  CV - Regular rate and rhythm, no murmur gallop or rub  Pulm - Lungs clear to auscultation without wheeze or rhonchi   GI - Soft, normoactive bowel sounds, non-tender  Ext - Without edema  Port  Site - There is protrusion of mediport through her skin - no evidence of erythema or purulence    Assessment/Plan:  Patient is a 48-year-old  female with history of tobacco abuse, COPD, lung nodule and bone metastasis who presents to office for evaluation of Mediport.  It appears " that the patient's Mediport has protruded through her skin, and is currently exposed.  The patient will require removal of current Mediport, and placement of a new venous access device.  I discussed wound care with the patient, instructing her to apply Betadine to this area 2 times per day, then keep it clean, dry and covered.  Our office will schedule the patient for a Port-A-Cath removal and placement next week.  If she has any questions, concerns or acutely worsening symptoms, she may call our office or present to the nearest emergency department immediately.    Patient Active Problem List   Diagnosis   • Lung nodule   • Osteoporosis   • Syringomyelia (CMS/HCC)   • Adenocarcinoma, lung, right (CMS/HCC)   • Osteoarthritis (arthritis due to wear and tear of joints)   • COPD (chronic obstructive pulmonary disease) (CMS/HCC)   • Allergic rhinitis   • Tobacco use   • Bone metastases (CMS/HCC)   • Encounter for long-term (current) use of high-risk medication

## 2018-12-20 NOTE — DISCHARGE INSTRUCTIONS

## 2018-12-20 NOTE — PROGRESS NOTES
PAC appears to be exposed through skin. No redness or drainage. Reported to Dr.John Ham office, spoke with AMANDEEP Parr. Office to call back with plan. Fanta Penn RN

## 2018-12-20 NOTE — PROGRESS NOTES
CHIEF COMPLAINT: Management adenocarcinoma the lung with bone metastases    Problem List:     Adenocarcinoma, lung, right (CMS/HCC)    8/10/2016 Initial Diagnosis     Adenocarcinoma of right lung, lower lobe, stage 1A status post right lower lobectomy September 2016.  On serial CT had new right upper lobe abnormality that was spiculated but actually decreased in size on May 2018 CT but nonetheless underwent bronchoscopy which showed level VII node involvement on 6/5/18.  No clear-cut disease at the left lower lobe stump.  CBC and CMP unremarkable.  Saw me for the first time 6/14/18.  This was completely asymptomatic.  I recommended PET scan and MRI of the brain.  Results are below with no extrathoracic metastasis.  I discussed the results of her 6/26/18 PET and MRI brain on 6/28/18 with Dr. Grey and he does not think that she would be eligible for resection.  Likewise, this known level VII node involvement on biopsy is under represented by the PET and we could not be certain that he had thoroughly treated this area.  Hence, he and I came to the mutual consensus that definitive chemotherapy and radiation would be her best bet at eradication though certainly not guarantee by any stretch.  She was subsequently educated on cisplatin and Alimta plus radiation and port will be placed by Dr. Ham.         6/26/2018 Imaging     Pet:  Impression:     1. Partially cavitary metabolic paramedian nodule posterior right lung  is concerning for second primary lesion or metastasis.  2. Hypermetabolic gagandeep activity is stable nonenlarged lymph nodes. With  the inflammatory changes in the upper lobes these might be reactive but  followup should be considered.  3. Nonspecific tiny focus of hypermetabolic activity posterior to the  thyroid gland. Attention on followup recommended     MRI brain negative for metastasis    CMP and CBC unremarkable save for alkaline phosphatase of 122         8/3/2018 - 9/14/2018 Chemotherapy     OP  LUNG PEMEtrexed / CISplatin + XRT           8/9/2018 - 9/26/2018 Radiation     Radiation OncologyTreatment Course:  Moriah Qiu received 6000 cGy in 30 fractions to right lung via External Beam Radiation - EBRT.         9/17/2018 Progression     CT chest IMPRESSION:  1. Continued increase in size of the paramedian subsolid lesion located  in what is the medial segment middle lobe (unusual location relative to  the spine is due to prior right lower lobectomy).  2. Grossly unchanged size of the mediastinal and right hilar lymph  nodes.  3. New sub solid nodule in the posterior aspect of the right upper lobe.  4. New osseous sclerotic metastases involving the imaged spine.           10/3/2018 Imaging     PET/CT IMPRESSION:  Mixed appearance of response to therapy with decreased  hypermetabolism within the anterior mediastinal lymph nodes and  decreased activity within the right lower lobe medial segment pulmonary  nodule indicating treatment response however noted increased sclerosis  and abnormal hypermetabolism within the sacral prominence and body  indicating progression of osseous metastatic involvement. Various other  sites of scattered sclerotic foci within the osseous structures however  no other sites of abnormal hypermetabolism associated with these  lesions.         10/19/2018 Imaging     MRI Brain IMPRESSION:  1.  No acute intracranial abnormality specifically, no evidence for  abnormal enhancement to suggest intracranial metastasis.  2.  Minimal amount of T2 and FLAIR increased signal abnormality within  the periventricular and deep white matter may represent early chronic  small vessel ischemic disease and/or sequela of migraines, however, no  associated abnormal enhancement or restriction.  MRI Thoracic spine  IMPRESSION:  Multilevel areas of abnormal hypoenhancing lesions  throughout the cervicothoracic spine as detailed above, greatest  involvement at T3, T6 and T11 levels, without pathologic  compression  deformity or retropulsion. The findings are consistent with osseous  metastasis, however, no intracanalicular involvement specifically, no  intramedullary or intradural abnormal enhancement to suggest thoracic  cord involvement or neuroforaminal/spinal canal stenosis. No significant  interval change in mid thoracic syrinx appearance from 2016 comparison  examination.            HISTORY OF PRESENT ILLNESS:  The patient is a 48 y.o. female, here for follow up on management of adenocarcinoma lung with bone metastases.    Her Opdivo in October.  There was no November but apparently did not get that dose and is here for for what is her second course now 8 weeks later.  Otherwise tolerating therapy reasonably well with no new complaints.      Past Medical History:   Diagnosis Date   • Back pain    • COPD (chronic obstructive pulmonary disease) (CMS/HCC)     emphysema   • Esophagus hernia    • Gall stone    • GERD (gastroesophageal reflux disease)    • History of stomach ulcers    • Lung cancer (CMS/HCC)    • Osteoarthritis (arthritis due to wear and tear of joints)    • Osteoporosis    • Uterine cancer (CMS/HCC) 1997    ovarian 1997   • Wears dentures      Past Surgical History:   Procedure Laterality Date   • BILATERAL OOPHORECTOMY     • BRONCHOSCOPY N/A 8/4/2016    Procedure: NAVIGATIONAL BRONCHOSCOPY;  Surgeon: Elder Tompkins MD;  Location:  [facility];  Service:    • BRONCHOSCOPY N/A 8/4/2016    Procedure: BRONCHOSCOPY WITH ENDOBRONCHIAL ULTRASOUND;  Surgeon: Elder Tompkins MD;  Location:  [facility];  Service:    • BRONCHOSCOPY N/A 6/5/2018    Procedure: BRONCHOSCOPY WITH ENDOBRONCHIAL ULTRASOUND WITH FLUORO;  Surgeon: Kingston Carrasquillo MD;  Location:  [facility];  Service: Pulmonary   • COLONOSCOPY      2016   • GALLBLADDER SURGERY  2016   • HYSTERECTOMY  1997   • THORACOSCOPY Right 9/15/2016    Procedure: BRONOSCOPY, RIGHT THORACOSCOPY VIDEO ASSISTED LOBECTOMY, MEDIASTINAL  "LYMPH NODE DISECTION;  Surgeon: Elder Ham MD;  Location:  [facility];  Service:    • VENOUS ACCESS DEVICE (PORT) INSERTION N/A 7/20/2018    Procedure: PORT PLACEMENT;  Surgeon: Elder Ham MD;  Location:  [facility];  Service: Cardiothoracic       No Known Allergies    Family History and Social History reviewed and changed as necessary      REVIEW OF SYSTEM:   Review of Systems   Constitutional: Negative for appetite change, chills, diaphoresis, fatigue, fever and unexpected weight change.   HENT:   Negative for mouth sores, sore throat and trouble swallowing.    Eyes: Negative for icterus.   Respiratory: Negative for cough, hemoptysis and shortness of breath.    Cardiovascular: Negative for chest pain, leg swelling and palpitations.   Gastrointestinal: Negative for abdominal distention, abdominal pain, blood in stool, constipation, diarrhea, nausea and vomiting.   Endocrine: Negative for hot flashes.   Genitourinary: Negative for bladder incontinence, difficulty urinating, dysuria, frequency and hematuria.    Musculoskeletal: Negative for gait problem, neck pain and neck stiffness.   Skin: Negative for rash.   Neurological: Negative for dizziness, gait problem, headaches, light-headedness and numbness.   Hematological: Negative for adenopathy. Does not bruise/bleed easily.   Psychiatric/Behavioral: Negative for depression. The patient is not nervous/anxious.    All other systems reviewed and are negative.       PHYSICAL EXAM    Vitals:    12/20/18 0854   BP: 97/67   Pulse: 104   Resp: 18   Temp: 97 °F (36.1 °C)   SpO2: 90%   Weight: 58.1 kg (128 lb)   Height: 170.2 cm (67\")     Constitutional: Appears well-developed and well-nourished. No distress.   ECOG: (0) Fully active, able to carry on all predisease performance without restriction  HENT:   Head: Normocephalic.   Mouth/Throat: Oropharynx is clear and moist.   Eyes: Conjunctivae are normal. Pupils are equal, round, and reactive to light. No " scleral icterus.   Neck: Neck supple. No JVD present. No thyromegaly present.   Cardiovascular: Normal rate, regular rhythm and normal heart sounds.    Pulmonary/Chest: Breath sounds normal. No respiratory distress.   Abdominal: Soft. Exhibits no distension and no mass. There is no hepatosplenomegaly. There is no tenderness. There is no rebound and no guarding.   Musculoskeletal:Exhibits no edema, tenderness or deformity.   Neurological: Alert and oriented to person, place, and time. Exhibits normal muscle tone.   Skin: No ecchymosis, no petechiae and no rash noted. Not diaphoretic. No cyanosis. Nails show no clubbing.   Psychiatric: Normal mood and affect.   Vitals reviewed.      No radiology results for the last 7 days          ASSESSMENT & PLAN:    1.  Adenocarcinoma lung  2. Probable bone metastases radiographically    Discussion: she will get course 2 of Opdivo today and we'll get course #3 in January with my nurse practitioner and we will rescan her in mid February before course #4.  Discussed with patient the importance of getting in for her monthly doses.  If there is no then we need to reschedule her for a few days later and not a month later.  Discussed with patient 15 minutes greater than 50% spent counseling.      Segundo Patterson MD    12/20/2018

## 2018-12-28 NOTE — ANESTHESIA POSTPROCEDURE EVALUATION
Patient: Moriah Qiu    Procedure Summary     Date:  12/28/18 Room / Location:   [facility] /  [facility]    Anesthesia Start:  1741 Anesthesia Stop:  1851    Procedures:       REMOVAL VENOUS ACCESS DEVICE (N/A )      INSERTION VENOUS ACCESS DEVICE (N/A Chest) Diagnosis:       Bone metastases (CMS/HCC)      (Bone metastases (CMS/HCC) [C79.51])    Surgeon:  Elder Ham MD Provider:  Kanchan Holliday MD    Anesthesia Type:  MAC ASA Status:  3          Anesthesia Type: MAC  Last vitals  BP   (!) 88/66 (12/28/18 1851)   Temp   97.2 °F (36.2 °C) (12/28/18 1851)   Pulse   102 (12/28/18 1851)   Resp   20 (12/28/18 1851)     SpO2   96 % (12/28/18 1851)     Post Anesthesia Care and Evaluation    Patient location during evaluation: PACU  Patient participation: complete - patient participated  Level of consciousness: awake  Pain score: 0  Pain management: adequate  Airway patency: patent  Anesthetic complications: No anesthetic complications  PONV Status: none  Cardiovascular status: acceptable and hemodynamically stable  Respiratory status: acceptable, nasal cannula, nonlabored ventilation and spontaneous ventilation  Hydration status: acceptable    Comments: No apparent anesthesia complications noted

## 2018-12-28 NOTE — ANESTHESIA PREPROCEDURE EVALUATION
Anesthesia Evaluation     Patient summary reviewed and Nursing notes reviewed   no history of anesthetic complications:  NPO Solid Status: > 8 hours  NPO Liquid Status: > 8 hours           Airway   Mallampati: I  TM distance: >3 FB  Neck ROM: full  No difficulty expected  Dental - normal exam   (+) lower dentures and upper dentures    Pulmonary - normal exam   (+) a smoker Current, lung cancer, COPD,   Cardiovascular - normal exam    ECG reviewed        Neuro/Psych  (+) headaches,     GI/Hepatic/Renal/Endo    (+)  hiatal hernia, GERD,      Musculoskeletal     (+) back pain,   Abdominal  - normal exam    Bowel sounds: normal.   Substance History      OB/GYN          Other   (+) arthritis   history of cancer (LUNG, UTERINE with bone mets)                    Anesthesia Plan    ASA 3     MAC   (Propofol gtt  MAC vs GA)  intravenous induction   Anesthetic plan, all risks, benefits, and alternatives have been provided, discussed and informed consent has been obtained with: patient.    Plan discussed with CRNA.

## 2019-01-01 ENCOUNTER — APPOINTMENT (OUTPATIENT)
Dept: ONCOLOGY | Facility: HOSPITAL | Age: 49
End: 2019-01-01

## 2019-01-01 ENCOUNTER — APPOINTMENT (OUTPATIENT)
Dept: GENERAL RADIOLOGY | Facility: HOSPITAL | Age: 49
End: 2019-01-01

## 2019-01-01 ENCOUNTER — HOSPITAL ENCOUNTER (OUTPATIENT)
Dept: PET IMAGING | Facility: HOSPITAL | Age: 49
Discharge: HOME | End: 2019-02-07
Admitting: NURSE PRACTITIONER

## 2019-01-01 ENCOUNTER — APPOINTMENT (OUTPATIENT)
Dept: CARDIOLOGY | Facility: HOSPITAL | Age: 49
End: 2019-01-01

## 2019-01-01 ENCOUNTER — APPOINTMENT (OUTPATIENT)
Dept: CT IMAGING | Facility: HOSPITAL | Age: 49
End: 2019-01-01

## 2019-01-01 ENCOUNTER — TELEPHONE (OUTPATIENT)
Dept: ONCOLOGY | Facility: CLINIC | Age: 49
End: 2019-01-01

## 2019-01-01 ENCOUNTER — OFFICE VISIT (OUTPATIENT)
Dept: ONCOLOGY | Facility: CLINIC | Age: 49
End: 2019-01-01

## 2019-01-01 ENCOUNTER — HOSPITAL ENCOUNTER (OUTPATIENT)
Dept: ONCOLOGY | Facility: HOSPITAL | Age: 49
Setting detail: INFUSION SERIES
Discharge: HOME | End: 2019-01-18

## 2019-01-01 ENCOUNTER — READMISSION MANAGEMENT (OUTPATIENT)
Dept: CALL CENTER | Facility: HOSPITAL | Age: 49
End: 2019-01-01

## 2019-01-01 ENCOUNTER — HOSPITAL ENCOUNTER (EMERGENCY)
Facility: HOSPITAL | Age: 49
Discharge: ANOTHER HOSPITAL | End: 2019-03-14
Attending: EMERGENCY MEDICINE | Admitting: EMERGENCY MEDICINE

## 2019-01-01 ENCOUNTER — HOSPITAL ENCOUNTER (INPATIENT)
Facility: HOSPITAL | Age: 49
LOS: 5 days | Discharge: HOSPICE | End: 2019-03-19
Attending: INTERNAL MEDICINE | Admitting: INTERNAL MEDICINE

## 2019-01-01 ENCOUNTER — HOSPITAL ENCOUNTER (OUTPATIENT)
Dept: PET IMAGING | Facility: HOSPITAL | Age: 49
Discharge: HOME | End: 2019-02-07

## 2019-01-01 ENCOUNTER — LAB (OUTPATIENT)
Dept: LAB | Facility: HOSPITAL | Age: 49
End: 2019-01-01

## 2019-01-01 ENCOUNTER — ANESTHESIA (OUTPATIENT)
Dept: GASTROENTEROLOGY | Facility: HOSPITAL | Age: 49
End: 2019-01-01

## 2019-01-01 ENCOUNTER — ANESTHESIA EVENT (OUTPATIENT)
Dept: GASTROENTEROLOGY | Facility: HOSPITAL | Age: 49
End: 2019-01-01

## 2019-01-01 VITALS
HEART RATE: 101 BPM | TEMPERATURE: 98.7 F | SYSTOLIC BLOOD PRESSURE: 103 MMHG | OXYGEN SATURATION: 96 % | HEIGHT: 66 IN | WEIGHT: 119 LBS | DIASTOLIC BLOOD PRESSURE: 71 MMHG | BODY MASS INDEX: 19.13 KG/M2 | RESPIRATION RATE: 16 BRPM

## 2019-01-01 VITALS
RESPIRATION RATE: 16 BRPM | OXYGEN SATURATION: 94 % | HEIGHT: 66 IN | TEMPERATURE: 97.8 F | HEART RATE: 101 BPM | BODY MASS INDEX: 20.25 KG/M2 | DIASTOLIC BLOOD PRESSURE: 66 MMHG | SYSTOLIC BLOOD PRESSURE: 97 MMHG | WEIGHT: 126 LBS

## 2019-01-01 VITALS
BODY MASS INDEX: 20.09 KG/M2 | HEIGHT: 66 IN | DIASTOLIC BLOOD PRESSURE: 79 MMHG | SYSTOLIC BLOOD PRESSURE: 113 MMHG | WEIGHT: 125 LBS | OXYGEN SATURATION: 94 % | TEMPERATURE: 97.1 F | HEART RATE: 102 BPM | RESPIRATION RATE: 20 BRPM

## 2019-01-01 VITALS
RESPIRATION RATE: 20 BRPM | OXYGEN SATURATION: 94 % | SYSTOLIC BLOOD PRESSURE: 104 MMHG | HEIGHT: 66 IN | HEART RATE: 73 BPM | BODY MASS INDEX: 19.64 KG/M2 | WEIGHT: 122.2 LBS | DIASTOLIC BLOOD PRESSURE: 61 MMHG | TEMPERATURE: 97.7 F

## 2019-01-01 DIAGNOSIS — G89.3 CANCER RELATED PAIN: ICD-10-CM

## 2019-01-01 DIAGNOSIS — C79.51 BONE METASTASES: ICD-10-CM

## 2019-01-01 DIAGNOSIS — D62 ACUTE BLOOD LOSS ANEMIA: Primary | ICD-10-CM

## 2019-01-01 DIAGNOSIS — D64.9 SYMPTOMATIC ANEMIA: Primary | ICD-10-CM

## 2019-01-01 DIAGNOSIS — Z79.899 ENCOUNTER FOR LONG-TERM (CURRENT) USE OF HIGH-RISK MEDICATION: ICD-10-CM

## 2019-01-01 DIAGNOSIS — C34.91 ADENOCARCINOMA, LUNG, RIGHT (HCC): Primary | ICD-10-CM

## 2019-01-01 DIAGNOSIS — C34.91 ADENOCARCINOMA, LUNG, RIGHT (HCC): ICD-10-CM

## 2019-01-01 DIAGNOSIS — R63.0 LACK OF APPETITE: ICD-10-CM

## 2019-01-01 DIAGNOSIS — C34.91 ADENOCARCINOMA, LUNG, RIGHT (HCC): Primary | Chronic | ICD-10-CM

## 2019-01-01 DIAGNOSIS — C34.91 ADENOCARCINOMA, LUNG, RIGHT (HCC): Chronic | ICD-10-CM

## 2019-01-01 LAB
ABO + RH BLD: NORMAL
ABO GROUP BLD: NORMAL
ALBUMIN SERPL-MCNC: 3.07 G/DL (ref 3.5–5.2)
ALBUMIN SERPL-MCNC: 3.5 G/DL (ref 3.2–4.8)
ALBUMIN SERPL-MCNC: 3.7 G/DL (ref 3.2–4.8)
ALBUMIN SERPL-MCNC: 3.9 G/DL (ref 3.2–4.8)
ALBUMIN SERPL-MCNC: 3.95 G/DL (ref 3.2–4.8)
ALBUMIN/GLOB SERPL: 1.4 G/DL
ALBUMIN/GLOB SERPL: 1.8 G/DL (ref 1.5–2.5)
ALBUMIN/GLOB SERPL: 1.8 G/DL (ref 1.5–2.5)
ALBUMIN/GLOB SERPL: 1.9 G/DL (ref 1.5–2.5)
ALBUMIN/GLOB SERPL: 1.9 G/DL (ref 1.5–2.5)
ALP SERPL-CCNC: 257 U/L (ref 25–100)
ALP SERPL-CCNC: 377 U/L (ref 25–100)
ALP SERPL-CCNC: 423 U/L (ref 25–100)
ALP SERPL-CCNC: 439 U/L (ref 39–117)
ALP SERPL-CCNC: 453 U/L (ref 25–100)
ALT SERPL W P-5'-P-CCNC: 10 U/L (ref 1–33)
ALT SERPL W P-5'-P-CCNC: 11 U/L (ref 7–40)
ALT SERPL W P-5'-P-CCNC: 14 U/L (ref 7–40)
ALT SERPL W P-5'-P-CCNC: 16 U/L (ref 7–40)
ALT SERPL W P-5'-P-CCNC: 7 U/L (ref 7–40)
ANION GAP SERPL CALCULATED.3IONS-SCNC: 10 MMOL/L (ref 3–11)
ANION GAP SERPL CALCULATED.3IONS-SCNC: 10 MMOL/L (ref 3–11)
ANION GAP SERPL CALCULATED.3IONS-SCNC: 13 MMOL/L (ref 3–11)
ANION GAP SERPL CALCULATED.3IONS-SCNC: 13.4 MMOL/L
ANION GAP SERPL CALCULATED.3IONS-SCNC: 6 MMOL/L (ref 3–11)
ANION GAP SERPL CALCULATED.3IONS-SCNC: 7 MMOL/L (ref 3–11)
ANION GAP SERPL CALCULATED.3IONS-SCNC: 9 MMOL/L (ref 3–11)
ANISOCYTOSIS BLD QL: ABNORMAL
APTT PPP: 102.4 SECONDS (ref 85–120)
APTT PPP: 28 SECONDS (ref 85–120)
APTT PPP: 32.1 SECONDS (ref 24–37)
APTT PPP: 36.5 SECONDS (ref 23.8–36.1)
APTT PPP: 65.4 SECONDS (ref 85–120)
APTT PPP: 78.7 SECONDS (ref 85–120)
APTT PPP: 81.2 SECONDS (ref 85–120)
APTT PPP: 82.9 SECONDS (ref 85–120)
APTT PPP: 89 SECONDS (ref 85–120)
APTT PPP: 89.1 SECONDS (ref 85–120)
APTT PPP: 92.9 SECONDS (ref 85–120)
AST SERPL-CCNC: 23 U/L (ref 0–33)
AST SERPL-CCNC: 24 U/L (ref 0–33)
AST SERPL-CCNC: 33 U/L (ref 1–32)
AST SERPL-CCNC: 38 U/L (ref 0–33)
AST SERPL-CCNC: 40 U/L (ref 0–33)
BACTERIA SPEC AEROBE CULT: NORMAL
BACTERIA SPEC AEROBE CULT: NORMAL
BASOPHILS # BLD AUTO: 0.04 10*3/MM3 (ref 0–0.2)
BASOPHILS # BLD AUTO: 0.09 10*3/MM3 (ref 0–0.2)
BASOPHILS # BLD MANUAL: 0 10*3/MM3 (ref 0–0.2)
BASOPHILS NFR BLD AUTO: 0 % (ref 0–1)
BASOPHILS NFR BLD AUTO: 0.8 % (ref 0–1)
BASOPHILS NFR BLD AUTO: 0.9 % (ref 0–1)
BH BB BLOOD EXPIRATION DATE: NORMAL
BH BB BLOOD TYPE BARCODE: 5100
BH BB BLOOD TYPE BARCODE: 6200
BH BB DISPENSE STATUS: NORMAL
BH BB PRODUCT CODE: NORMAL
BH BB UNIT NUMBER: NORMAL
BH CV ECHO MEAS - AO ROOT AREA (BSA CORRECTED): 1.7
BH CV ECHO MEAS - AO ROOT AREA: 5.7 CM^2
BH CV ECHO MEAS - AO ROOT DIAM: 2.7 CM
BH CV ECHO MEAS - BSA(HAYCOCK): 1.6 M^2
BH CV ECHO MEAS - BSA: 1.6 M^2
BH CV ECHO MEAS - BZI_BMI: 19.7 KILOGRAMS/M^2
BH CV ECHO MEAS - BZI_METRIC_HEIGHT: 167.6 CM
BH CV ECHO MEAS - BZI_METRIC_WEIGHT: 55.3 KG
BH CV ECHO MEAS - EDV(CUBED): 78.1 ML
BH CV ECHO MEAS - EDV(TEICH): 81.9 ML
BH CV ECHO MEAS - EF(CUBED): 83.8 %
BH CV ECHO MEAS - EF(TEICH): 77.2 %
BH CV ECHO MEAS - ESV(CUBED): 12.6 ML
BH CV ECHO MEAS - ESV(TEICH): 18.7 ML
BH CV ECHO MEAS - FS: 45.5 %
BH CV ECHO MEAS - IVS/LVPW: 1.1
BH CV ECHO MEAS - IVSD: 0.99 CM
BH CV ECHO MEAS - LA DIMENSION: 3.3 CM
BH CV ECHO MEAS - LA/AO: 1.2
BH CV ECHO MEAS - LAT PEAK E' VEL: 6.8 CM/SEC
BH CV ECHO MEAS - LATERAL E/E' RATIO: 21.3
BH CV ECHO MEAS - LV MASS(C)D: 134.7 GRAMS
BH CV ECHO MEAS - LV MASS(C)DI: 83.1 GRAMS/M^2
BH CV ECHO MEAS - LVIDD: 4.3 CM
BH CV ECHO MEAS - LVIDS: 2.3 CM
BH CV ECHO MEAS - LVPWD: 0.94 CM
BH CV ECHO MEAS - MED PEAK E' VEL: 9.9 CM/SEC
BH CV ECHO MEAS - MEDIAL E/E' RATIO: 14.7
BH CV ECHO MEAS - MV A MAX VEL: 107.1 CM/SEC
BH CV ECHO MEAS - MV DEC SLOPE: 578.1 CM/SEC^2
BH CV ECHO MEAS - MV DEC TIME: 0.26 SEC
BH CV ECHO MEAS - MV E MAX VEL: 147.1 CM/SEC
BH CV ECHO MEAS - MV E/A: 1.4
BH CV ECHO MEAS - MV MAX PG: 11.2 MMHG
BH CV ECHO MEAS - MV MEAN PG: 4.3 MMHG
BH CV ECHO MEAS - MV P1/2T MAX VEL: 162.9 CM/SEC
BH CV ECHO MEAS - MV P1/2T: 82.5 MSEC
BH CV ECHO MEAS - MV V2 MAX: 167.4 CM/SEC
BH CV ECHO MEAS - MV V2 MEAN: 96.2 CM/SEC
BH CV ECHO MEAS - MV V2 VTI: 48.1 CM
BH CV ECHO MEAS - MVA P1/2T LCG: 1.4 CM^2
BH CV ECHO MEAS - MVA(P1/2T): 2.7 CM^2
BH CV ECHO MEAS - PA ACC SLOPE: 699.6 CM/SEC^2
BH CV ECHO MEAS - PA ACC TIME: 0.09 SEC
BH CV ECHO MEAS - PA PR(ACCEL): 37.4 MMHG
BH CV ECHO MEAS - RAP SYSTOLE: 3 MMHG
BH CV ECHO MEAS - RV MAX PG: 0.72 MMHG
BH CV ECHO MEAS - RV V1 MAX: 42.4 CM/SEC
BH CV ECHO MEAS - RVSP: 35 MMHG
BH CV ECHO MEAS - SI(CUBED): 40.4 ML/M^2
BH CV ECHO MEAS - SI(TEICH): 39 ML/M^2
BH CV ECHO MEAS - SV(CUBED): 65.5 ML
BH CV ECHO MEAS - SV(TEICH): 63.2 ML
BH CV ECHO MEAS - TAPSE (>1.6): 2.3 CM2
BH CV ECHO MEAS - TR MAX PG: 32 MMHG
BH CV ECHO MEAS - TR MAX VEL: 279.8 CM/SEC
BH CV ECHO MEASUREMENTS AVERAGE E/E' RATIO: 17.62
BH CV XLRA - RV BASE: 4 CM
BH CV XLRA - RV LENGTH: 8 CM
BH CV XLRA - RV MID: 3.5 CM
BH CV XLRA - TDI S': 15.2 CM/SEC
BILIRUB SERPL-MCNC: 0.1 MG/DL (ref 0.3–1.2)
BILIRUB SERPL-MCNC: 0.2 MG/DL (ref 0.3–1.2)
BILIRUB SERPL-MCNC: 0.4 MG/DL (ref 0.1–1.2)
BILIRUB SERPL-MCNC: 0.7 MG/DL (ref 0.3–1.2)
BILIRUB SERPL-MCNC: 0.8 MG/DL (ref 0.3–1.2)
BILIRUB UR QL STRIP: NEGATIVE
BLD GP AB SCN SERPL QL: NEGATIVE
BLD GP AB SCN SERPL QL: NEGATIVE
BNP SERPL-MCNC: 413 PG/ML (ref 0–100)
BUN BLD-MCNC: 12 MG/DL (ref 9–23)
BUN BLD-MCNC: 12 MG/DL (ref 9–23)
BUN BLD-MCNC: 13 MG/DL (ref 9–23)
BUN BLD-MCNC: 19 MG/DL (ref 9–23)
BUN BLD-MCNC: 21 MG/DL (ref 6–20)
BUN/CREAT SERPL: 16 (ref 7–25)
BUN/CREAT SERPL: 17.6 (ref 7–25)
BUN/CREAT SERPL: 22.6 (ref 7–25)
BUN/CREAT SERPL: 23.6 (ref 7–25)
BUN/CREAT SERPL: 24.1 (ref 7–25)
BUN/CREAT SERPL: 27.1 (ref 7–25)
BUN/CREAT SERPL: 30 (ref 7–25)
C3 FRG RBC-MCNC: ABNORMAL
CA-I SERPL ISE-MCNC: 1.05 MMOL/L (ref 1.12–1.32)
CALCIUM SPEC-SCNC: 7 MG/DL (ref 8.6–10.5)
CALCIUM SPEC-SCNC: 7 MG/DL (ref 8.7–10.4)
CALCIUM SPEC-SCNC: 7.3 MG/DL (ref 8.7–10.4)
CALCIUM SPEC-SCNC: 7.3 MG/DL (ref 8.7–10.4)
CALCIUM SPEC-SCNC: 7.4 MG/DL (ref 8.7–10.4)
CALCIUM SPEC-SCNC: 7.4 MG/DL (ref 8.7–10.4)
CALCIUM SPEC-SCNC: 8.5 MG/DL (ref 8.7–10.4)
CHLORIDE SERPL-SCNC: 104 MMOL/L (ref 99–109)
CHLORIDE SERPL-SCNC: 105 MMOL/L (ref 98–107)
CHLORIDE SERPL-SCNC: 105 MMOL/L (ref 99–109)
CHLORIDE SERPL-SCNC: 106 MMOL/L (ref 99–109)
CHLORIDE SERPL-SCNC: 107 MMOL/L (ref 99–109)
CHLORIDE SERPL-SCNC: 107 MMOL/L (ref 99–109)
CHLORIDE SERPL-SCNC: 108 MMOL/L (ref 99–109)
CLARITY UR: CLEAR
CO2 SERPL-SCNC: 14.6 MMOL/L (ref 22–29)
CO2 SERPL-SCNC: 17 MMOL/L (ref 20–31)
CO2 SERPL-SCNC: 20 MMOL/L (ref 20–31)
CO2 SERPL-SCNC: 20 MMOL/L (ref 20–31)
CO2 SERPL-SCNC: 22 MMOL/L (ref 20–31)
COLOR UR: YELLOW
CORTIS AM PEAK SERPL-MCNC: 10.7 MCG/DL (ref 4.3–22.4)
CREAT BLD-MCNC: 0.53 MG/DL (ref 0.6–1.3)
CREAT BLD-MCNC: 0.54 MG/DL (ref 0.6–1.3)
CREAT BLD-MCNC: 0.55 MG/DL (ref 0.6–1.3)
CREAT BLD-MCNC: 0.7 MG/DL (ref 0.57–1)
CREAT BLD-MCNC: 0.7 MG/DL (ref 0.6–1.3)
CREAT BLD-MCNC: 0.74 MG/DL (ref 0.6–1.3)
CREAT BLD-MCNC: 0.75 MG/DL (ref 0.6–1.3)
CREAT BLDA-MCNC: 0.7 MG/DL
CREAT BLDA-MCNC: 0.7 MG/DL (ref 0.6–1.3)
CYTO UR: NORMAL
D-LACTATE SERPL-SCNC: 1.4 MMOL/L (ref 0.5–2)
D-LACTATE SERPL-SCNC: 1.5 MMOL/L (ref 0.5–2)
DACRYOCYTES BLD QL SMEAR: ABNORMAL
DEPRECATED RDW RBC AUTO: 57.7 FL (ref 37–54)
DEPRECATED RDW RBC AUTO: 61.8 FL (ref 37–54)
DEPRECATED RDW RBC AUTO: 63.4 FL (ref 37–54)
DEPRECATED RDW RBC AUTO: 64.1 FL (ref 37–54)
DEPRECATED RDW RBC AUTO: 64.3 FL (ref 37–54)
DEPRECATED RDW RBC AUTO: 65.7 FL (ref 37–54)
DEPRECATED RDW RBC AUTO: 70 FL (ref 37–54)
DEPRECATED RDW RBC AUTO: 95.6 FL (ref 37–54)
EOSINOPHIL # BLD AUTO: 0.02 10*3/MM3 (ref 0–0.3)
EOSINOPHIL # BLD AUTO: 0.06 10*3/MM3 (ref 0–0.3)
EOSINOPHIL # BLD MANUAL: 0 10*3/MM3 (ref 0.1–0.3)
EOSINOPHIL # BLD MANUAL: 0 10*3/MM3 (ref 0.1–0.3)
EOSINOPHIL # BLD MANUAL: 0.06 10*3/MM3 (ref 0.1–0.3)
EOSINOPHIL # BLD MANUAL: 0.08 10*3/MM3 (ref 0–0.4)
EOSINOPHIL NFR BLD AUTO: 0.2 % (ref 0–3)
EOSINOPHIL NFR BLD AUTO: 1.2 % (ref 0–3)
EOSINOPHIL NFR BLD MANUAL: 0 % (ref 0–3)
EOSINOPHIL NFR BLD MANUAL: 0 % (ref 0–3)
EOSINOPHIL NFR BLD MANUAL: 1 % (ref 0.3–6.2)
EOSINOPHIL NFR BLD MANUAL: 1 % (ref 0–3)
ERYTHROCYTE [DISTWIDTH] IN BLOOD BY AUTOMATED COUNT: 17 % (ref 11.3–14.5)
ERYTHROCYTE [DISTWIDTH] IN BLOOD BY AUTOMATED COUNT: 17.5 % (ref 11.3–14.5)
ERYTHROCYTE [DISTWIDTH] IN BLOOD BY AUTOMATED COUNT: 19.9 % (ref 11.3–14.5)
ERYTHROCYTE [DISTWIDTH] IN BLOOD BY AUTOMATED COUNT: 20.8 % (ref 11.3–14.5)
ERYTHROCYTE [DISTWIDTH] IN BLOOD BY AUTOMATED COUNT: 20.9 % (ref 11.3–14.5)
ERYTHROCYTE [DISTWIDTH] IN BLOOD BY AUTOMATED COUNT: 20.9 % (ref 11.3–14.5)
ERYTHROCYTE [DISTWIDTH] IN BLOOD BY AUTOMATED COUNT: 21 % (ref 11.3–14.5)
ERYTHROCYTE [DISTWIDTH] IN BLOOD BY AUTOMATED COUNT: 21.2 % (ref 11.3–14.5)
ERYTHROCYTE [DISTWIDTH] IN BLOOD BY AUTOMATED COUNT: 24.3 % (ref 12.3–15.4)
FLUAV AG NPH QL: NEGATIVE
FLUBV AG NPH QL IA: NEGATIVE
GFR SERPL CREATININE-BSD FRML MDRD: 117 ML/MIN/1.73
GFR SERPL CREATININE-BSD FRML MDRD: 120 ML/MIN/1.73
GFR SERPL CREATININE-BSD FRML MDRD: 123 ML/MIN/1.73
GFR SERPL CREATININE-BSD FRML MDRD: 82 ML/MIN/1.73
GFR SERPL CREATININE-BSD FRML MDRD: 83 ML/MIN/1.73
GFR SERPL CREATININE-BSD FRML MDRD: 89 ML/MIN/1.73
GFR SERPL CREATININE-BSD FRML MDRD: 89 ML/MIN/1.73
GLOBULIN UR ELPH-MCNC: 1.9 GM/DL
GLOBULIN UR ELPH-MCNC: 2 GM/DL
GLOBULIN UR ELPH-MCNC: 2.1 GM/DL
GLOBULIN UR ELPH-MCNC: 2.2 GM/DL
GLOBULIN UR ELPH-MCNC: 2.3 GM/DL
GLUCOSE BLD-MCNC: 105 MG/DL (ref 70–100)
GLUCOSE BLD-MCNC: 110 MG/DL (ref 65–99)
GLUCOSE BLD-MCNC: 120 MG/DL (ref 70–100)
GLUCOSE BLD-MCNC: 130 MG/DL (ref 70–100)
GLUCOSE BLD-MCNC: 147 MG/DL (ref 70–100)
GLUCOSE BLD-MCNC: 179 MG/DL (ref 70–100)
GLUCOSE BLD-MCNC: 89 MG/DL (ref 70–100)
GLUCOSE BLDC GLUCOMTR-MCNC: 96 MG/DL (ref 70–130)
GLUCOSE UR STRIP-MCNC: NEGATIVE MG/DL
HCT VFR BLD AUTO: 12.2 % (ref 34–46.6)
HCT VFR BLD AUTO: 21.4 % (ref 34.5–44)
HCT VFR BLD AUTO: 21.7 % (ref 34.5–44)
HCT VFR BLD AUTO: 21.7 % (ref 34.5–44)
HCT VFR BLD AUTO: 22.2 % (ref 34.5–44)
HCT VFR BLD AUTO: 23 % (ref 34.5–44)
HCT VFR BLD AUTO: 23 % (ref 34.5–44)
HCT VFR BLD AUTO: 28.2 % (ref 34.5–44)
HCT VFR BLD AUTO: 28.3 % (ref 34.5–44)
HCT VFR BLD AUTO: 28.4 % (ref 34.5–44)
HCT VFR BLD AUTO: 28.4 % (ref 34.5–44)
HCT VFR BLD AUTO: 28.7 % (ref 34.5–44)
HCT VFR BLD AUTO: 28.7 % (ref 34.5–44)
HCT VFR BLD AUTO: 28.9 % (ref 34.5–44)
HCT VFR BLD AUTO: 32.6 % (ref 34.5–44)
HCT VFR BLD AUTO: 34.5 % (ref 34.5–44)
HEMOCCULT STL QL: POSITIVE
HGB BLD-MCNC: 10.4 G/DL (ref 11.5–15.5)
HGB BLD-MCNC: 10.6 G/DL (ref 11.5–15.5)
HGB BLD-MCNC: 3.5 G/DL (ref 12–15.9)
HGB BLD-MCNC: 6.7 G/DL (ref 11.5–15.5)
HGB BLD-MCNC: 7.1 G/DL (ref 11.5–15.5)
HGB BLD-MCNC: 7.5 G/DL (ref 11.5–15.5)
HGB BLD-MCNC: 7.5 G/DL (ref 11.5–15.5)
HGB BLD-MCNC: 8.9 G/DL (ref 11.5–15.5)
HGB BLD-MCNC: 9.1 G/DL (ref 11.5–15.5)
HGB BLD-MCNC: 9.1 G/DL (ref 11.5–15.5)
HGB BLD-MCNC: 9.2 G/DL (ref 11.5–15.5)
HGB BLD-MCNC: 9.2 G/DL (ref 11.5–15.5)
HGB BLD-MCNC: 9.3 G/DL (ref 11.5–15.5)
HGB BLD-MCNC: 9.3 G/DL (ref 11.5–15.5)
HGB UR QL STRIP.AUTO: NEGATIVE
HYPOCHROMIA BLD QL: ABNORMAL
IMM GRANULOCYTES # BLD AUTO: 0.11 10*3/MM3 (ref 0–0.03)
IMM GRANULOCYTES # BLD AUTO: 0.82 10*3/MM3 (ref 0–0.05)
IMM GRANULOCYTES NFR BLD AUTO: 2.1 % (ref 0–0.6)
IMM GRANULOCYTES NFR BLD AUTO: 8.4 % (ref 0–0.6)
INR PPP: 1.44 (ref 0.85–1.16)
INR PPP: 1.49 (ref 0.85–1.16)
INR PPP: 1.58 (ref 0.9–1.1)
IRON 24H UR-MRATE: 333 MCG/DL (ref 50–175)
IRON SATN MFR SERPL: 87 % (ref 15–50)
KETONES UR QL STRIP: ABNORMAL
LAB AP CASE REPORT: NORMAL
LAB AP CLINICAL INFORMATION: NORMAL
LEUKOCYTE ESTERASE UR QL STRIP.AUTO: NEGATIVE
LV EF 2D ECHO EST: 60 %
LYMPHOCYTES # BLD AUTO: 0.7 10*3/MM3 (ref 0.6–4.8)
LYMPHOCYTES # BLD AUTO: 0.78 10*3/MM3 (ref 0.6–4.8)
LYMPHOCYTES # BLD AUTO: 1.33 10*3/MM3 (ref 0.6–4.8)
LYMPHOCYTES # BLD MANUAL: 0.24 10*3/MM3 (ref 0.6–4.8)
LYMPHOCYTES # BLD MANUAL: 0.5 10*3/MM3 (ref 0.6–4.8)
LYMPHOCYTES # BLD MANUAL: 0.65 10*3/MM3 (ref 0.6–4.8)
LYMPHOCYTES # BLD MANUAL: 1.06 10*3/MM3 (ref 0.7–3.1)
LYMPHOCYTES NFR BLD AUTO: 13.6 % (ref 24–44)
LYMPHOCYTES NFR BLD AUTO: 15.1 % (ref 24–44)
LYMPHOCYTES NFR BLD AUTO: 16 % (ref 24–44)
LYMPHOCYTES NFR BLD MANUAL: 10 % (ref 0–12)
LYMPHOCYTES NFR BLD MANUAL: 14 % (ref 19.6–45.3)
LYMPHOCYTES NFR BLD MANUAL: 4 % (ref 0–12)
LYMPHOCYTES NFR BLD MANUAL: 4 % (ref 24–44)
LYMPHOCYTES NFR BLD MANUAL: 5 % (ref 0–12)
LYMPHOCYTES NFR BLD MANUAL: 5 % (ref 5–12)
LYMPHOCYTES NFR BLD MANUAL: 6 % (ref 24–44)
LYMPHOCYTES NFR BLD MANUAL: 9 % (ref 24–44)
MACROCYTES BLD QL SMEAR: ABNORMAL
MACROCYTES BLD QL SMEAR: ABNORMAL
MAGNESIUM SERPL-MCNC: 1.9 MG/DL (ref 1.3–2.7)
MAGNESIUM SERPL-MCNC: 2.1 MG/DL (ref 1.3–2.7)
MAGNESIUM SERPL-MCNC: 2.6 MG/DL (ref 1.3–2.7)
MAXIMAL PREDICTED HEART RATE: 171 BPM
MCH RBC QN AUTO: 31.2 PG (ref 27–31)
MCH RBC QN AUTO: 31.2 PG (ref 27–31)
MCH RBC QN AUTO: 31.3 PG (ref 27–31)
MCH RBC QN AUTO: 31.6 PG (ref 27–31)
MCH RBC QN AUTO: 31.7 PG (ref 27–31)
MCH RBC QN AUTO: 31.9 PG (ref 27–31)
MCH RBC QN AUTO: 33.7 PG (ref 26.6–33)
MCH RBC QN AUTO: 34.3 PG (ref 27–31)
MCH RBC QN AUTO: 35.1 PG (ref 27–31)
MCHC RBC AUTO-ENTMCNC: 28.7 G/DL (ref 31.5–35.7)
MCHC RBC AUTO-ENTMCNC: 30.7 G/DL (ref 32–36)
MCHC RBC AUTO-ENTMCNC: 30.9 G/DL (ref 32–36)
MCHC RBC AUTO-ENTMCNC: 31.7 G/DL (ref 32–36)
MCHC RBC AUTO-ENTMCNC: 32 G/DL (ref 32–36)
MCHC RBC AUTO-ENTMCNC: 32.1 G/DL (ref 32–36)
MCHC RBC AUTO-ENTMCNC: 32.4 G/DL (ref 32–36)
MCHC RBC AUTO-ENTMCNC: 32.6 G/DL (ref 32–36)
MCHC RBC AUTO-ENTMCNC: 32.9 G/DL (ref 32–36)
MCV RBC AUTO: 102.4 FL (ref 80–99)
MCV RBC AUTO: 109.3 FL (ref 80–99)
MCV RBC AUTO: 111.7 FL (ref 80–99)
MCV RBC AUTO: 117.3 FL (ref 79–97)
MCV RBC AUTO: 95 FL (ref 80–99)
MCV RBC AUTO: 96.6 FL (ref 80–99)
MCV RBC AUTO: 97.9 FL (ref 80–99)
MCV RBC AUTO: 98.3 FL (ref 80–99)
MCV RBC AUTO: 99.1 FL (ref 80–99)
METAMYELOCYTES NFR BLD MANUAL: 3 % (ref 0–0)
METAMYELOCYTES NFR BLD MANUAL: 4 % (ref 0–0)
METAMYELOCYTES NFR BLD MANUAL: 5 % (ref 0–0)
METAMYELOCYTES NFR BLD MANUAL: 5 % (ref 0–0)
MICROCYTES BLD QL: ABNORMAL
MONOCYTES # BLD AUTO: 0.2 10*3/MM3 (ref 0–1)
MONOCYTES # BLD AUTO: 0.29 10*3/MM3 (ref 0–1)
MONOCYTES # BLD AUTO: 0.38 10*3/MM3 (ref 0.1–0.9)
MONOCYTES # BLD AUTO: 0.42 10*3/MM3 (ref 0–1)
MONOCYTES # BLD AUTO: 0.43 10*3/MM3 (ref 0–1)
MONOCYTES # BLD AUTO: 0.61 10*3/MM3 (ref 0–1)
MONOCYTES # BLD AUTO: 0.89 10*3/MM3 (ref 0–1)
MONOCYTES NFR BLD AUTO: 4.9 % (ref 0–12)
MONOCYTES NFR BLD AUTO: 8.3 % (ref 0–12)
MONOCYTES NFR BLD AUTO: 9.1 % (ref 0–12)
MRSA DNA SPEC QL NAA+PROBE: NEGATIVE
MYELOCYTES NFR BLD MANUAL: 1 % (ref 0–0)
MYELOCYTES NFR BLD MANUAL: 3 % (ref 0–0)
MYELOCYTES NFR BLD MANUAL: 3 % (ref 0–0)
NEUTROPHILS # BLD AUTO: 3.5 10*3/MM3 (ref 1.5–8.3)
NEUTROPHILS # BLD AUTO: 3.75 10*3/MM3 (ref 1.5–8.3)
NEUTROPHILS # BLD AUTO: 4.86 10*3/MM3 (ref 1.5–8.3)
NEUTROPHILS # BLD AUTO: 5.61 10*3/MM3 (ref 1.4–7)
NEUTROPHILS # BLD AUTO: 5.88 10*3/MM3 (ref 1.5–8.3)
NEUTROPHILS # BLD AUTO: 6.81 10*3/MM3 (ref 1.5–8.3)
NEUTROPHILS # BLD AUTO: 7.42 10*3/MM3 (ref 1.5–8.3)
NEUTROPHILS NFR BLD AUTO: 72.5 % (ref 41–71)
NEUTROPHILS NFR BLD AUTO: 76.2 % (ref 41–71)
NEUTROPHILS NFR BLD AUTO: 79.1 % (ref 41–71)
NEUTROPHILS NFR BLD MANUAL: 63 % (ref 41–71)
NEUTROPHILS NFR BLD MANUAL: 64 % (ref 41–71)
NEUTROPHILS NFR BLD MANUAL: 68 % (ref 42.7–76)
NEUTROPHILS NFR BLD MANUAL: 74 % (ref 41–71)
NEUTS BAND NFR BLD MANUAL: 17 % (ref 0–5)
NEUTS BAND NFR BLD MANUAL: 17 % (ref 0–5)
NEUTS BAND NFR BLD MANUAL: 6 % (ref 0–5)
NEUTS BAND NFR BLD MANUAL: 8 % (ref 0–5)
NITRITE UR QL STRIP: NEGATIVE
NRBC SPEC MANUAL: 13 /100 WBC (ref 0–0)
NRBC SPEC MANUAL: 14 /100 WBC (ref 0–0)
NRBC SPEC MANUAL: 4 /100 WBC (ref 0–0)
NRBC SPEC MANUAL: 5 /100 WBC (ref 0–0)
NT-PROBNP SERPL-MCNC: 3499 PG/ML (ref 5–450)
OVALOCYTES BLD QL SMEAR: ABNORMAL
PATH REPORT.FINAL DX SPEC: NORMAL
PATH REPORT.GROSS SPEC: NORMAL
PH UR STRIP.AUTO: 5.5 [PH] (ref 5–8)
PHOSPHATE SERPL-MCNC: 2.2 MG/DL (ref 2.4–5.1)
PHOSPHATE SERPL-MCNC: 2.8 MG/DL (ref 2.4–5.1)
PLAT MORPH BLD: NORMAL
PLATELET # BLD AUTO: 118 10*3/MM3 (ref 150–450)
PLATELET # BLD AUTO: 150 10*3/MM3 (ref 150–450)
PLATELET # BLD AUTO: 38 10*3/MM3 (ref 140–450)
PLATELET # BLD AUTO: 44 10*3/MM3 (ref 150–450)
PLATELET # BLD AUTO: 46 10*3/MM3 (ref 150–450)
PLATELET # BLD AUTO: 46 10*3/MM3 (ref 150–450)
PLATELET # BLD AUTO: 49 10*3/MM3 (ref 150–450)
PLATELET # BLD AUTO: 51 10*3/MM3 (ref 150–450)
PLATELET # BLD AUTO: 92 10*3/MM3 (ref 150–450)
PMV BLD AUTO: 10 FL (ref 6–12)
PMV BLD AUTO: 10.8 FL (ref 6–12)
PMV BLD AUTO: 10.9 FL (ref 6–12)
PMV BLD AUTO: 10.9 FL (ref 6–12)
PMV BLD AUTO: 11.1 FL (ref 6–12)
PMV BLD AUTO: 11.2 FL (ref 6–12)
PMV BLD AUTO: 11.5 FL (ref 6–12)
PMV BLD AUTO: 11.7 FL (ref 6–12)
PMV BLD AUTO: 7.6 FL (ref 6–12)
POLYCHROMASIA BLD QL SMEAR: ABNORMAL
POLYCHROMASIA BLD QL SMEAR: NORMAL
POTASSIUM BLD-SCNC: 3.8 MMOL/L (ref 3.5–5.5)
POTASSIUM BLD-SCNC: 3.9 MMOL/L (ref 3.5–5.5)
POTASSIUM BLD-SCNC: 3.9 MMOL/L (ref 3.5–5.5)
POTASSIUM BLD-SCNC: 4.2 MMOL/L (ref 3.5–5.5)
POTASSIUM BLD-SCNC: 4.3 MMOL/L (ref 3.5–5.5)
POTASSIUM BLD-SCNC: 4.5 MMOL/L (ref 3.5–5.2)
POTASSIUM BLD-SCNC: 4.5 MMOL/L (ref 3.5–5.5)
PROCALCITONIN SERPL-MCNC: 0.22 NG/ML
PROT SERPL-MCNC: 5.3 G/DL (ref 6–8.5)
PROT SERPL-MCNC: 5.4 G/DL (ref 5.7–8.2)
PROT SERPL-MCNC: 5.7 G/DL (ref 5.7–8.2)
PROT SERPL-MCNC: 6 G/DL (ref 5.7–8.2)
PROT SERPL-MCNC: 6.2 G/DL (ref 5.7–8.2)
PROT UR QL STRIP: NEGATIVE
PROTHROMBIN TIME: 16.9 SECONDS (ref 11.2–14.3)
PROTHROMBIN TIME: 17.3 SECONDS (ref 11.2–14.3)
PROTHROMBIN TIME: 19.1 SECONDS (ref 11–15.4)
RBC # BLD AUTO: 1.04 10*6/MM3 (ref 3.77–5.28)
RBC # BLD AUTO: 2.12 10*6/MM3 (ref 3.89–5.14)
RBC # BLD AUTO: 2.24 10*6/MM3 (ref 3.89–5.14)
RBC # BLD AUTO: 2.35 10*6/MM3 (ref 3.89–5.14)
RBC # BLD AUTO: 2.92 10*6/MM3 (ref 3.89–5.14)
RBC # BLD AUTO: 2.94 10*6/MM3 (ref 3.89–5.14)
RBC # BLD AUTO: 2.98 10*6/MM3 (ref 3.89–5.14)
RBC # BLD AUTO: 2.98 10*6/MM3 (ref 3.89–5.14)
RBC # BLD AUTO: 3.09 10*6/MM3 (ref 3.89–5.14)
RETICS # AUTO: 0.21 10*6/MM3 (ref 0.02–0.13)
RETICS/RBC NFR AUTO: 10.03 % (ref 0.5–1.5)
RH BLD: POSITIVE
SCAN SLIDE: NORMAL
SCAN SLIDE: NORMAL
SCHISTOCYTES BLD QL SMEAR: ABNORMAL
SMALL PLATELETS BLD QL SMEAR: ABNORMAL
SODIUM BLD-SCNC: 133 MMOL/L (ref 136–145)
SODIUM BLD-SCNC: 135 MMOL/L (ref 132–146)
SODIUM BLD-SCNC: 135 MMOL/L (ref 132–146)
SODIUM BLD-SCNC: 136 MMOL/L (ref 132–146)
SODIUM BLD-SCNC: 137 MMOL/L (ref 132–146)
SP GR UR STRIP: 1.03 (ref 1–1.03)
STRESS TARGET HR: 145 BPM
T&S EXPIRATION DATE: NORMAL
T&S EXPIRATION DATE: NORMAL
T4 FREE SERPL-MCNC: 0.9 NG/DL (ref 0.89–1.76)
T4 FREE SERPL-MCNC: 0.96 NG/DL (ref 0.89–1.76)
TIBC SERPL-MCNC: 383 MCG/DL (ref 250–450)
TROPONIN I SERPL-MCNC: 0.43 NG/ML
TROPONIN I SERPL-MCNC: 0.52 NG/ML
TROPONIN I SERPL-MCNC: 0.57 NG/ML
TROPONIN T SERPL-MCNC: 0.09 NG/ML (ref 0–0.03)
TSH SERPL DL<=0.05 MIU/L-ACNC: 0.95 MIU/ML (ref 0.35–5.35)
TSH SERPL DL<=0.05 MIU/L-ACNC: 1.75 MIU/ML (ref 0.35–5.35)
UNIT  ABO: NORMAL
UNIT  RH: NORMAL
UROBILINOGEN UR QL STRIP: ABNORMAL
VANCOMYCIN TROUGH SERPL-MCNC: 15.6 MCG/ML (ref 10–20)
VIT B12 BLD-MCNC: >2000 PG/ML (ref 211–911)
WBC MORPH BLD: NORMAL
WBC NRBC COR # BLD: 4.4 10*3/MM3 (ref 3.5–10.8)
WBC NRBC COR # BLD: 5.17 10*3/MM3 (ref 3.5–10.8)
WBC NRBC COR # BLD: 6.08 10*3/MM3 (ref 3.5–10.8)
WBC NRBC COR # BLD: 7.26 10*3/MM3 (ref 3.5–10.8)
WBC NRBC COR # BLD: 7.58 10*3/MM3 (ref 3.4–10.8)
WBC NRBC COR # BLD: 8.3 10*3/MM3 (ref 3.5–10.8)
WBC NRBC COR # BLD: 8.62 10*3/MM3 (ref 3.5–10.8)
WBC NRBC COR # BLD: 8.78 10*3/MM3 (ref 3.5–10.8)
WBC NRBC COR # BLD: 9.75 10*3/MM3 (ref 3.5–10.8)

## 2019-01-01 PROCEDURE — 86900 BLOOD TYPING SEROLOGIC ABO: CPT | Performed by: EMERGENCY MEDICINE

## 2019-01-01 PROCEDURE — 85610 PROTHROMBIN TIME: CPT | Performed by: INTERNAL MEDICINE

## 2019-01-01 PROCEDURE — 93010 ELECTROCARDIOGRAM REPORT: CPT | Performed by: INTERNAL MEDICINE

## 2019-01-01 PROCEDURE — P9016 RBC LEUKOCYTES REDUCED: HCPCS

## 2019-01-01 PROCEDURE — 83540 ASSAY OF IRON: CPT | Performed by: FAMILY MEDICINE

## 2019-01-01 PROCEDURE — 83735 ASSAY OF MAGNESIUM: CPT | Performed by: NURSE PRACTITIONER

## 2019-01-01 PROCEDURE — 85014 HEMATOCRIT: CPT | Performed by: INTERNAL MEDICINE

## 2019-01-01 PROCEDURE — 93005 ELECTROCARDIOGRAM TRACING: CPT | Performed by: EMERGENCY MEDICINE

## 2019-01-01 PROCEDURE — 86923 COMPATIBILITY TEST ELECTRIC: CPT

## 2019-01-01 PROCEDURE — 80053 COMPREHEN METABOLIC PANEL: CPT | Performed by: INTERNAL MEDICINE

## 2019-01-01 PROCEDURE — 99239 HOSP IP/OBS DSCHRG MGMT >30: CPT | Performed by: NURSE PRACTITIONER

## 2019-01-01 PROCEDURE — 85018 HEMOGLOBIN: CPT | Performed by: INTERNAL MEDICINE

## 2019-01-01 PROCEDURE — 86901 BLOOD TYPING SEROLOGIC RH(D): CPT | Performed by: NURSE PRACTITIONER

## 2019-01-01 PROCEDURE — 25010000002 VANCOMYCIN PER 500 MG

## 2019-01-01 PROCEDURE — 83735 ASSAY OF MAGNESIUM: CPT

## 2019-01-01 PROCEDURE — 84443 ASSAY THYROID STIM HORMONE: CPT

## 2019-01-01 PROCEDURE — 85610 PROTHROMBIN TIME: CPT | Performed by: FAMILY MEDICINE

## 2019-01-01 PROCEDURE — 85730 THROMBOPLASTIN TIME PARTIAL: CPT

## 2019-01-01 PROCEDURE — 85025 COMPLETE CBC W/AUTO DIFF WBC: CPT | Performed by: INTERNAL MEDICINE

## 2019-01-01 PROCEDURE — A9552 F18 FDG: HCPCS | Performed by: NURSE PRACTITIONER

## 2019-01-01 PROCEDURE — 84484 ASSAY OF TROPONIN QUANT: CPT | Performed by: FAMILY MEDICINE

## 2019-01-01 PROCEDURE — 25010000002 ENOXAPARIN PER 10 MG: Performed by: HOSPITALIST

## 2019-01-01 PROCEDURE — 63710000001 DEXAMETHASONE PER 0.25 MG: Performed by: FAMILY MEDICINE

## 2019-01-01 PROCEDURE — 25010000002 PROPOFOL 10 MG/ML EMULSION: Performed by: NURSE ANESTHETIST, CERTIFIED REGISTERED

## 2019-01-01 PROCEDURE — 99232 SBSQ HOSP IP/OBS MODERATE 35: CPT | Performed by: INTERNAL MEDICINE

## 2019-01-01 PROCEDURE — 83605 ASSAY OF LACTIC ACID: CPT | Performed by: EMERGENCY MEDICINE

## 2019-01-01 PROCEDURE — 83880 ASSAY OF NATRIURETIC PEPTIDE: CPT | Performed by: FAMILY MEDICINE

## 2019-01-01 PROCEDURE — 25010000002 HEPARIN (PORCINE) PER 1000 UNITS: Performed by: INTERNAL MEDICINE

## 2019-01-01 PROCEDURE — 80053 COMPREHEN METABOLIC PANEL: CPT | Performed by: FAMILY MEDICINE

## 2019-01-01 PROCEDURE — 99285 EMERGENCY DEPT VISIT HI MDM: CPT

## 2019-01-01 PROCEDURE — 25010000002 HEPARIN (PORCINE) PER 1000 UNITS

## 2019-01-01 PROCEDURE — 85025 COMPLETE CBC W/AUTO DIFF WBC: CPT | Performed by: FAMILY MEDICINE

## 2019-01-01 PROCEDURE — 87804 INFLUENZA ASSAY W/OPTIC: CPT | Performed by: EMERGENCY MEDICINE

## 2019-01-01 PROCEDURE — 25010000002 HYDROMORPHONE PER 4 MG: Performed by: INTERNAL MEDICINE

## 2019-01-01 PROCEDURE — 86900 BLOOD TYPING SEROLOGIC ABO: CPT

## 2019-01-01 PROCEDURE — 85007 BL SMEAR W/DIFF WBC COUNT: CPT | Performed by: INTERNAL MEDICINE

## 2019-01-01 PROCEDURE — 99233 SBSQ HOSP IP/OBS HIGH 50: CPT | Performed by: INTERNAL MEDICINE

## 2019-01-01 PROCEDURE — 25010000002 MORPHINE PER 10 MG: Performed by: EMERGENCY MEDICINE

## 2019-01-01 PROCEDURE — 94640 AIRWAY INHALATION TREATMENT: CPT

## 2019-01-01 PROCEDURE — 71045 X-RAY EXAM CHEST 1 VIEW: CPT | Performed by: RADIOLOGY

## 2019-01-01 PROCEDURE — 94799 UNLISTED PULMONARY SVC/PX: CPT

## 2019-01-01 PROCEDURE — 80053 COMPREHEN METABOLIC PANEL: CPT

## 2019-01-01 PROCEDURE — 85014 HEMATOCRIT: CPT | Performed by: FAMILY MEDICINE

## 2019-01-01 PROCEDURE — 84145 PROCALCITONIN (PCT): CPT | Performed by: FAMILY MEDICINE

## 2019-01-01 PROCEDURE — 99231 SBSQ HOSP IP/OBS SF/LOW 25: CPT | Performed by: THORACIC SURGERY (CARDIOTHORACIC VASCULAR SURGERY)

## 2019-01-01 PROCEDURE — 36415 COLL VENOUS BLD VENIPUNCTURE: CPT

## 2019-01-01 PROCEDURE — 99255 IP/OBS CONSLTJ NEW/EST HI 80: CPT | Performed by: INTERNAL MEDICINE

## 2019-01-01 PROCEDURE — 96374 THER/PROPH/DIAG INJ IV PUSH: CPT

## 2019-01-01 PROCEDURE — 36430 TRANSFUSION BLD/BLD COMPNT: CPT

## 2019-01-01 PROCEDURE — 25010000002 VANCOMYCIN 10 G RECONSTITUTED SOLUTION

## 2019-01-01 PROCEDURE — 85730 THROMBOPLASTIN TIME PARTIAL: CPT | Performed by: FAMILY MEDICINE

## 2019-01-01 PROCEDURE — 85018 HEMOGLOBIN: CPT | Performed by: FAMILY MEDICINE

## 2019-01-01 PROCEDURE — 82272 OCCULT BLD FECES 1-3 TESTS: CPT | Performed by: NURSE PRACTITIONER

## 2019-01-01 PROCEDURE — 99214 OFFICE O/P EST MOD 30 MIN: CPT | Performed by: NURSE PRACTITIONER

## 2019-01-01 PROCEDURE — 25010000002 PIPERACILLIN SOD-TAZOBACTAM PER 1 G: Performed by: NURSE PRACTITIONER

## 2019-01-01 PROCEDURE — 84439 ASSAY OF FREE THYROXINE: CPT

## 2019-01-01 PROCEDURE — 83880 ASSAY OF NATRIURETIC PEPTIDE: CPT | Performed by: EMERGENCY MEDICINE

## 2019-01-01 PROCEDURE — 70470 CT HEAD/BRAIN W/O & W/DYE: CPT

## 2019-01-01 PROCEDURE — 82330 ASSAY OF CALCIUM: CPT | Performed by: FAMILY MEDICINE

## 2019-01-01 PROCEDURE — 80048 BASIC METABOLIC PNL TOTAL CA: CPT | Performed by: INTERNAL MEDICINE

## 2019-01-01 PROCEDURE — 82533 TOTAL CORTISOL: CPT | Performed by: NURSE PRACTITIONER

## 2019-01-01 PROCEDURE — 99231 SBSQ HOSP IP/OBS SF/LOW 25: CPT | Performed by: PHYSICIAN ASSISTANT

## 2019-01-01 PROCEDURE — 80202 ASSAY OF VANCOMYCIN: CPT

## 2019-01-01 PROCEDURE — 86901 BLOOD TYPING SEROLOGIC RH(D): CPT

## 2019-01-01 PROCEDURE — 85025 COMPLETE CBC W/AUTO DIFF WBC: CPT | Performed by: EMERGENCY MEDICINE

## 2019-01-01 PROCEDURE — 87641 MR-STAPH DNA AMP PROBE: CPT

## 2019-01-01 PROCEDURE — 82962 GLUCOSE BLOOD TEST: CPT

## 2019-01-01 PROCEDURE — 87040 BLOOD CULTURE FOR BACTERIA: CPT | Performed by: NURSE PRACTITIONER

## 2019-01-01 PROCEDURE — 25010000002 FUROSEMIDE PER 20 MG: Performed by: INTERNAL MEDICINE

## 2019-01-01 PROCEDURE — 99223 1ST HOSP IP/OBS HIGH 75: CPT | Performed by: FAMILY MEDICINE

## 2019-01-01 PROCEDURE — 84100 ASSAY OF PHOSPHORUS: CPT | Performed by: NURSE PRACTITIONER

## 2019-01-01 PROCEDURE — 81003 URINALYSIS AUTO W/O SCOPE: CPT | Performed by: NURSE PRACTITIONER

## 2019-01-01 PROCEDURE — 83735 ASSAY OF MAGNESIUM: CPT | Performed by: FAMILY MEDICINE

## 2019-01-01 PROCEDURE — 80053 COMPREHEN METABOLIC PANEL: CPT | Performed by: EMERGENCY MEDICINE

## 2019-01-01 PROCEDURE — 86850 RBC ANTIBODY SCREEN: CPT | Performed by: NURSE PRACTITIONER

## 2019-01-01 PROCEDURE — 86900 BLOOD TYPING SEROLOGIC ABO: CPT | Performed by: NURSE PRACTITIONER

## 2019-01-01 PROCEDURE — 85610 PROTHROMBIN TIME: CPT | Performed by: EMERGENCY MEDICINE

## 2019-01-01 PROCEDURE — 25010000002 IOPAMIDOL 61 % SOLUTION: Performed by: EMERGENCY MEDICINE

## 2019-01-01 PROCEDURE — 82565 ASSAY OF CREATININE: CPT

## 2019-01-01 PROCEDURE — 70470 CT HEAD/BRAIN W/O & W/DYE: CPT | Performed by: RADIOLOGY

## 2019-01-01 PROCEDURE — 93005 ELECTROCARDIOGRAM TRACING: CPT | Performed by: FAMILY MEDICINE

## 2019-01-01 PROCEDURE — 99291 CRITICAL CARE FIRST HOUR: CPT | Performed by: INTERNAL MEDICINE

## 2019-01-01 PROCEDURE — 84439 ASSAY OF FREE THYROXINE: CPT | Performed by: NURSE PRACTITIONER

## 2019-01-01 PROCEDURE — 0 FLUDEOXYGLUCOSE F18 SOLUTION: Performed by: NURSE PRACTITIONER

## 2019-01-01 PROCEDURE — 25010000002 HYDROMORPHONE 1 MG/ML SOLUTION: Performed by: FAMILY MEDICINE

## 2019-01-01 PROCEDURE — 0DB78ZX EXCISION OF STOMACH, PYLORUS, VIA NATURAL OR ARTIFICIAL OPENING ENDOSCOPIC, DIAGNOSTIC: ICD-10-PCS | Performed by: INTERNAL MEDICINE

## 2019-01-01 PROCEDURE — 71045 X-RAY EXAM CHEST 1 VIEW: CPT

## 2019-01-01 PROCEDURE — 84443 ASSAY THYROID STIM HORMONE: CPT | Performed by: NURSE PRACTITIONER

## 2019-01-01 PROCEDURE — 25010000002 NIVOLUMAB 100 MG/10ML SOLUTION 10 ML VIAL: Performed by: NURSE PRACTITIONER

## 2019-01-01 PROCEDURE — 84100 ASSAY OF PHOSPHORUS: CPT

## 2019-01-01 PROCEDURE — 85045 AUTOMATED RETICULOCYTE COUNT: CPT | Performed by: FAMILY MEDICINE

## 2019-01-01 PROCEDURE — 93306 TTE W/DOPPLER COMPLETE: CPT

## 2019-01-01 PROCEDURE — 93306 TTE W/DOPPLER COMPLETE: CPT | Performed by: INTERNAL MEDICINE

## 2019-01-01 PROCEDURE — 82607 VITAMIN B-12: CPT | Performed by: FAMILY MEDICINE

## 2019-01-01 PROCEDURE — 85027 COMPLETE CBC AUTOMATED: CPT | Performed by: INTERNAL MEDICINE

## 2019-01-01 PROCEDURE — 84484 ASSAY OF TROPONIN QUANT: CPT | Performed by: EMERGENCY MEDICINE

## 2019-01-01 PROCEDURE — 99254 IP/OBS CNSLTJ NEW/EST MOD 60: CPT | Performed by: INTERNAL MEDICINE

## 2019-01-01 PROCEDURE — 83550 IRON BINDING TEST: CPT | Performed by: FAMILY MEDICINE

## 2019-01-01 PROCEDURE — 86901 BLOOD TYPING SEROLOGIC RH(D): CPT | Performed by: EMERGENCY MEDICINE

## 2019-01-01 PROCEDURE — 78815 PET IMAGE W/CT SKULL-THIGH: CPT

## 2019-01-01 PROCEDURE — 96413 CHEMO IV INFUSION 1 HR: CPT

## 2019-01-01 PROCEDURE — 25010000002 ENOXAPARIN PER 10 MG: Performed by: INTERNAL MEDICINE

## 2019-01-01 PROCEDURE — 25010000002 ONDANSETRON PER 1 MG: Performed by: EMERGENCY MEDICINE

## 2019-01-01 PROCEDURE — 25010000002 FUROSEMIDE PER 20 MG: Performed by: FAMILY MEDICINE

## 2019-01-01 PROCEDURE — 88305 TISSUE EXAM BY PATHOLOGIST: CPT | Performed by: INTERNAL MEDICINE

## 2019-01-01 PROCEDURE — 96375 TX/PRO/DX INJ NEW DRUG ADDON: CPT

## 2019-01-01 PROCEDURE — 86850 RBC ANTIBODY SCREEN: CPT | Performed by: EMERGENCY MEDICINE

## 2019-01-01 PROCEDURE — 85025 COMPLETE CBC W/AUTO DIFF WBC: CPT

## 2019-01-01 PROCEDURE — 25010000002 DENOSUMAB 120 MG/1.7ML SOLUTION: Performed by: NURSE PRACTITIONER

## 2019-01-01 PROCEDURE — 85007 BL SMEAR W/DIFF WBC COUNT: CPT | Performed by: EMERGENCY MEDICINE

## 2019-01-01 PROCEDURE — 99215 OFFICE O/P EST HI 40 MIN: CPT | Performed by: INTERNAL MEDICINE

## 2019-01-01 PROCEDURE — 96372 THER/PROPH/DIAG INJ SC/IM: CPT

## 2019-01-01 PROCEDURE — 96361 HYDRATE IV INFUSION ADD-ON: CPT

## 2019-01-01 PROCEDURE — P9035 PLATELET PHERES LEUKOREDUCED: HCPCS

## 2019-01-01 PROCEDURE — 83605 ASSAY OF LACTIC ACID: CPT | Performed by: FAMILY MEDICINE

## 2019-01-01 PROCEDURE — 85730 THROMBOPLASTIN TIME PARTIAL: CPT | Performed by: EMERGENCY MEDICINE

## 2019-01-01 PROCEDURE — 25010000002 NIVOLUMAB 40 MG/4ML SOLUTION 4 ML VIAL: Performed by: NURSE PRACTITIONER

## 2019-01-01 PROCEDURE — 99253 IP/OBS CNSLTJ NEW/EST LOW 45: CPT | Performed by: INTERNAL MEDICINE

## 2019-01-01 RX ORDER — MORPHINE SULFATE 15 MG/1
15 TABLET ORAL EVERY 6 HOURS PRN
Start: 2019-01-01 | End: 2019-01-01

## 2019-01-01 RX ORDER — LIDOCAINE 50 MG/G
1 PATCH TOPICAL
Qty: 30 EACH | Refills: 1 | Status: SHIPPED | OUTPATIENT
Start: 2019-01-01

## 2019-01-01 RX ORDER — EPHEDRINE SULFATE 50 MG/ML
5 INJECTION, SOLUTION INTRAVENOUS ONCE AS NEEDED
Status: DISCONTINUED | OUTPATIENT
Start: 2019-01-01 | End: 2019-01-01

## 2019-01-01 RX ORDER — DEXAMETHASONE 4 MG/1
TABLET ORAL
Qty: 12 TABLET | Refills: 5 | Status: CANCELLED | OUTPATIENT
Start: 2019-01-01

## 2019-01-01 RX ORDER — ALBUTEROL SULFATE 90 UG/1
2 AEROSOL, METERED RESPIRATORY (INHALATION) EVERY 4 HOURS PRN
Refills: 0 | COMMUNITY
Start: 2019-01-01

## 2019-01-01 RX ORDER — ASPIRIN 325 MG
325 TABLET ORAL DAILY
COMMUNITY
End: 2019-01-01 | Stop reason: HOSPADM

## 2019-01-01 RX ORDER — SUCRALFATE 1 G/1
1 TABLET ORAL
Status: DISCONTINUED | OUTPATIENT
Start: 2019-01-01 | End: 2019-01-01 | Stop reason: HOSPADM

## 2019-01-01 RX ORDER — NICOTINE 21 MG/24HR
1 PATCH, TRANSDERMAL 24 HOURS TRANSDERMAL
Status: DISCONTINUED | OUTPATIENT
Start: 2019-01-01 | End: 2019-01-01

## 2019-01-01 RX ORDER — SODIUM CHLORIDE 9 MG/ML
200 INJECTION, SOLUTION INTRAVENOUS CONTINUOUS
Status: DISCONTINUED | OUTPATIENT
Start: 2019-01-01 | End: 2019-01-01 | Stop reason: HOSPADM

## 2019-01-01 RX ORDER — FUROSEMIDE 10 MG/ML
20 INJECTION INTRAMUSCULAR; INTRAVENOUS ONCE AS NEEDED
Status: COMPLETED | OUTPATIENT
Start: 2019-01-01 | End: 2019-01-01

## 2019-01-01 RX ORDER — HYDROCODONE BITARTRATE AND ACETAMINOPHEN 10; 325 MG/1; MG/1
1 TABLET ORAL ONCE
Status: COMPLETED | OUTPATIENT
Start: 2019-01-01 | End: 2019-01-01

## 2019-01-01 RX ORDER — DEXAMETHASONE 4 MG/1
TABLET ORAL
Qty: 12 TABLET | Refills: 5 | Status: SHIPPED | OUTPATIENT
Start: 2019-01-01

## 2019-01-01 RX ORDER — AMOXICILLIN AND CLAVULANATE POTASSIUM 875; 125 MG/1; MG/1
1 TABLET, FILM COATED ORAL EVERY 12 HOURS SCHEDULED
Status: DISCONTINUED | OUTPATIENT
Start: 2019-01-01 | End: 2019-01-01 | Stop reason: HOSPADM

## 2019-01-01 RX ORDER — SODIUM CHLORIDE 0.9 % (FLUSH) 0.9 %
10 SYRINGE (ML) INJECTION AS NEEDED
Status: DISCONTINUED | OUTPATIENT
Start: 2019-01-01 | End: 2019-01-01 | Stop reason: HOSPADM

## 2019-01-01 RX ORDER — MORPHINE SULFATE 30 MG/1
30 TABLET, FILM COATED, EXTENDED RELEASE ORAL EVERY 8 HOURS SCHEDULED
Status: DISCONTINUED | OUTPATIENT
Start: 2019-01-01 | End: 2019-01-01 | Stop reason: HOSPADM

## 2019-01-01 RX ORDER — SODIUM CHLORIDE, SODIUM LACTATE, POTASSIUM CHLORIDE, CALCIUM CHLORIDE 600; 310; 30; 20 MG/100ML; MG/100ML; MG/100ML; MG/100ML
30 INJECTION, SOLUTION INTRAVENOUS CONTINUOUS
Status: DISCONTINUED | OUTPATIENT
Start: 2019-01-01 | End: 2019-01-01

## 2019-01-01 RX ORDER — SUCRALFATE 1 G/1
1 TABLET ORAL
Qty: 120 TABLET | Refills: 1 | Status: SHIPPED | OUTPATIENT
Start: 2019-01-01

## 2019-01-01 RX ORDER — PROPOFOL 10 MG/ML
VIAL (ML) INTRAVENOUS AS NEEDED
Status: DISCONTINUED | OUTPATIENT
Start: 2019-01-01 | End: 2019-01-01 | Stop reason: SURG

## 2019-01-01 RX ORDER — NICOTINE 21 MG/24HR
1 PATCH, TRANSDERMAL 24 HOURS TRANSDERMAL
Qty: 28 EACH | Refills: 1 | Status: SHIPPED | OUTPATIENT
Start: 2019-01-01 | End: 2019-01-01

## 2019-01-01 RX ORDER — NALOXONE HYDROCHLORIDE 4 MG/.1ML
4 SPRAY NASAL TAKE AS DIRECTED
Refills: 0 | COMMUNITY
Start: 2019-01-01

## 2019-01-01 RX ORDER — LACTULOSE 10 G/15ML
10 SOLUTION ORAL 3 TIMES DAILY
Status: COMPLETED | OUTPATIENT
Start: 2019-01-01 | End: 2019-01-01

## 2019-01-01 RX ORDER — IPRATROPIUM BROMIDE AND ALBUTEROL SULFATE 2.5; .5 MG/3ML; MG/3ML
3 SOLUTION RESPIRATORY (INHALATION)
Status: DISCONTINUED | OUTPATIENT
Start: 2019-01-01 | End: 2019-01-01 | Stop reason: HOSPADM

## 2019-01-01 RX ORDER — HYDROMORPHONE HYDROCHLORIDE 1 MG/ML
0.5 INJECTION, SOLUTION INTRAMUSCULAR; INTRAVENOUS; SUBCUTANEOUS ONCE
Status: COMPLETED | OUTPATIENT
Start: 2019-01-01 | End: 2019-01-01

## 2019-01-01 RX ORDER — SODIUM CHLORIDE 0.9 % (FLUSH) 0.9 %
3-10 SYRINGE (ML) INJECTION AS NEEDED
Status: DISCONTINUED | OUTPATIENT
Start: 2019-01-01 | End: 2019-01-01 | Stop reason: HOSPADM

## 2019-01-01 RX ORDER — SODIUM CHLORIDE 9 MG/ML
INJECTION, SOLUTION INTRAVENOUS
Status: COMPLETED
Start: 2019-01-01 | End: 2019-01-01

## 2019-01-01 RX ORDER — ONDANSETRON HYDROCHLORIDE 8 MG/1
8 TABLET, FILM COATED ORAL 3 TIMES DAILY PRN
Qty: 30 TABLET | Refills: 5 | Status: SHIPPED | OUTPATIENT
Start: 2019-01-01 | End: 2019-01-01 | Stop reason: HOSPADM

## 2019-01-01 RX ORDER — NICOTINE 21 MG/24HR
1 PATCH, TRANSDERMAL 24 HOURS TRANSDERMAL
Status: DISCONTINUED | OUTPATIENT
Start: 2019-01-01 | End: 2019-01-01 | Stop reason: HOSPADM

## 2019-01-01 RX ORDER — LIDOCAINE 50 MG/G
1 PATCH TOPICAL
Status: DISCONTINUED | OUTPATIENT
Start: 2019-01-01 | End: 2019-01-01 | Stop reason: HOSPADM

## 2019-01-01 RX ORDER — IPRATROPIUM BROMIDE AND ALBUTEROL SULFATE 2.5; .5 MG/3ML; MG/3ML
3 SOLUTION RESPIRATORY (INHALATION) ONCE
Status: COMPLETED | OUTPATIENT
Start: 2019-01-01 | End: 2019-01-01

## 2019-01-01 RX ORDER — MORPHINE SULFATE 30 MG/1
30 TABLET, FILM COATED, EXTENDED RELEASE ORAL NIGHTLY
Status: DISCONTINUED | OUTPATIENT
Start: 2019-01-01 | End: 2019-01-01

## 2019-01-01 RX ORDER — FUROSEMIDE 10 MG/ML
20 INJECTION INTRAMUSCULAR; INTRAVENOUS ONCE
Status: COMPLETED | OUTPATIENT
Start: 2019-01-01 | End: 2019-01-01

## 2019-01-01 RX ORDER — PANTOPRAZOLE SODIUM 40 MG/1
40 TABLET, DELAYED RELEASE ORAL
Status: DISCONTINUED | OUTPATIENT
Start: 2019-01-01 | End: 2019-01-01

## 2019-01-01 RX ORDER — DEXAMETHASONE 4 MG/1
8 TABLET ORAL 2 TIMES DAILY WITH MEALS
Status: DISCONTINUED | OUTPATIENT
Start: 2019-01-01 | End: 2019-01-01 | Stop reason: HOSPADM

## 2019-01-01 RX ORDER — ONDANSETRON HYDROCHLORIDE 8 MG/1
8 TABLET, FILM COATED ORAL 3 TIMES DAILY PRN
Qty: 30 TABLET | Refills: 5 | Status: CANCELLED | OUTPATIENT
Start: 2019-01-01

## 2019-01-01 RX ORDER — CHOLECALCIFEROL (VITAMIN D3) 125 MCG
5 CAPSULE ORAL ONCE
Status: COMPLETED | OUTPATIENT
Start: 2019-01-01 | End: 2019-01-01

## 2019-01-01 RX ORDER — SODIUM CHLORIDE/ALOE VERA
GEL (GRAM) NASAL 4 TIMES DAILY
Status: DISCONTINUED | OUTPATIENT
Start: 2019-01-01 | End: 2019-01-01 | Stop reason: HOSPADM

## 2019-01-01 RX ORDER — AZITHROMYCIN 250 MG/1
250 TABLET, FILM COATED ORAL
Status: DISCONTINUED | OUTPATIENT
Start: 2019-01-01 | End: 2019-01-01 | Stop reason: HOSPADM

## 2019-01-01 RX ORDER — SENNOSIDES 8.6 MG
2 TABLET ORAL 2 TIMES DAILY
Status: DISCONTINUED | OUTPATIENT
Start: 2019-01-01 | End: 2019-01-01 | Stop reason: HOSPADM

## 2019-01-01 RX ORDER — LIDOCAINE HYDROCHLORIDE 10 MG/ML
INJECTION, SOLUTION EPIDURAL; INFILTRATION; INTRACAUDAL; PERINEURAL AS NEEDED
Status: DISCONTINUED | OUTPATIENT
Start: 2019-01-01 | End: 2019-01-01 | Stop reason: SURG

## 2019-01-01 RX ORDER — ONDANSETRON 2 MG/ML
4 INJECTION INTRAMUSCULAR; INTRAVENOUS ONCE AS NEEDED
Status: DISCONTINUED | OUTPATIENT
Start: 2019-01-01 | End: 2019-01-01

## 2019-01-01 RX ORDER — AMOXICILLIN AND CLAVULANATE POTASSIUM 875; 125 MG/1; MG/1
1 TABLET, FILM COATED ORAL EVERY 12 HOURS SCHEDULED
Qty: 3 TABLET | Refills: 0 | Status: SHIPPED | OUTPATIENT
Start: 2019-01-01 | End: 2019-01-01

## 2019-01-01 RX ORDER — NICOTINE 21 MG/24HR
1 PATCH, TRANSDERMAL 24 HOURS TRANSDERMAL
Qty: 28 EACH | Refills: 1 | Status: SHIPPED | OUTPATIENT
Start: 2019-01-01

## 2019-01-01 RX ORDER — AZITHROMYCIN 250 MG/1
250 TABLET, FILM COATED ORAL DAILY
Qty: 1 TABLET | Refills: 0 | Status: SHIPPED | OUTPATIENT
Start: 2019-01-01

## 2019-01-01 RX ORDER — SACCHAROMYCES BOULARDII 250 MG
250 CAPSULE ORAL 2 TIMES DAILY
Status: DISCONTINUED | OUTPATIENT
Start: 2019-01-01 | End: 2019-01-01 | Stop reason: HOSPADM

## 2019-01-01 RX ORDER — SODIUM CHLORIDE 9 MG/ML
250 INJECTION, SOLUTION INTRAVENOUS ONCE
Status: CANCELLED | OUTPATIENT
Start: 2019-01-01

## 2019-01-01 RX ORDER — MORPHINE SULFATE 15 MG/1
15 TABLET, FILM COATED, EXTENDED RELEASE ORAL EVERY 8 HOURS SCHEDULED
Status: DISCONTINUED | OUTPATIENT
Start: 2019-01-01 | End: 2019-01-01

## 2019-01-01 RX ORDER — NALOXONE HCL 0.4 MG/ML
0.4 VIAL (ML) INJECTION AS NEEDED
Status: DISCONTINUED | OUTPATIENT
Start: 2019-01-01 | End: 2019-01-01

## 2019-01-01 RX ORDER — BISACODYL 5 MG/1
10 TABLET, DELAYED RELEASE ORAL DAILY PRN
Status: DISCONTINUED | OUTPATIENT
Start: 2019-01-01 | End: 2019-01-01 | Stop reason: HOSPADM

## 2019-01-01 RX ORDER — ONDANSETRON HYDROCHLORIDE 8 MG/1
8 TABLET, FILM COATED ORAL 3 TIMES DAILY PRN
Qty: 90 TABLET | Refills: 0 | Status: SHIPPED | OUTPATIENT
Start: 2019-01-01

## 2019-01-01 RX ORDER — PANTOPRAZOLE SODIUM 40 MG/1
40 TABLET, DELAYED RELEASE ORAL 2 TIMES DAILY
Qty: 60 TABLET | Refills: 0 | Status: SHIPPED | OUTPATIENT
Start: 2019-01-01

## 2019-01-01 RX ORDER — TEMAZEPAM 15 MG/1
15 CAPSULE ORAL NIGHTLY
Status: DISCONTINUED | OUTPATIENT
Start: 2019-01-01 | End: 2019-01-01 | Stop reason: HOSPADM

## 2019-01-01 RX ORDER — LACTULOSE 10 G/15ML
10 SOLUTION ORAL 3 TIMES DAILY PRN
Status: DISCONTINUED | OUTPATIENT
Start: 2019-01-01 | End: 2019-01-01 | Stop reason: HOSPADM

## 2019-01-01 RX ORDER — SODIUM CHLORIDE 0.9 % (FLUSH) 0.9 %
3 SYRINGE (ML) INJECTION EVERY 12 HOURS SCHEDULED
Status: DISCONTINUED | OUTPATIENT
Start: 2019-01-01 | End: 2019-01-01 | Stop reason: HOSPADM

## 2019-01-01 RX ORDER — BENZONATATE 100 MG/1
200 CAPSULE ORAL 3 TIMES DAILY PRN
Status: DISCONTINUED | OUTPATIENT
Start: 2019-01-01 | End: 2019-01-01 | Stop reason: HOSPADM

## 2019-01-01 RX ORDER — BISACODYL 5 MG/1
5-10 TABLET, DELAYED RELEASE ORAL 2 TIMES DAILY PRN
COMMUNITY

## 2019-01-01 RX ORDER — IPRATROPIUM BROMIDE AND ALBUTEROL SULFATE 2.5; .5 MG/3ML; MG/3ML
3 SOLUTION RESPIRATORY (INHALATION) EVERY 4 HOURS PRN
Status: DISCONTINUED | OUTPATIENT
Start: 2019-01-01 | End: 2019-01-01

## 2019-01-01 RX ORDER — DOCUSATE SODIUM 100 MG/1
200 CAPSULE, LIQUID FILLED ORAL DAILY
Refills: 0 | COMMUNITY
Start: 2019-01-01 | End: 2019-01-01

## 2019-01-01 RX ORDER — ALPRAZOLAM 0.25 MG/1
0.25 TABLET ORAL 2 TIMES DAILY PRN
Status: DISCONTINUED | OUTPATIENT
Start: 2019-01-01 | End: 2019-01-01 | Stop reason: HOSPADM

## 2019-01-01 RX ORDER — FOLIC ACID 1 MG/1
1 TABLET ORAL DAILY
Status: DISCONTINUED | OUTPATIENT
Start: 2019-01-01 | End: 2019-01-01 | Stop reason: HOSPADM

## 2019-01-01 RX ORDER — SODIUM CHLORIDE 0.9 % (FLUSH) 0.9 %
10 SYRINGE (ML) INJECTION AS NEEDED
Status: CANCELLED | OUTPATIENT
Start: 2019-01-01

## 2019-01-01 RX ORDER — SODIUM CHLORIDE 9 MG/ML
75 INJECTION, SOLUTION INTRAVENOUS CONTINUOUS
Status: DISCONTINUED | OUTPATIENT
Start: 2019-01-01 | End: 2019-01-01

## 2019-01-01 RX ORDER — MORPHINE SULFATE 15 MG/1
15 TABLET, FILM COATED, EXTENDED RELEASE ORAL EVERY 12 HOURS
Qty: 60 TABLET | Refills: 0 | Status: SHIPPED | OUTPATIENT
Start: 2019-01-01 | End: 2019-01-01

## 2019-01-01 RX ORDER — PANTOPRAZOLE SODIUM 40 MG/10ML
40 INJECTION, POWDER, LYOPHILIZED, FOR SOLUTION INTRAVENOUS 2 TIMES DAILY
Status: DISCONTINUED | OUTPATIENT
Start: 2019-01-01 | End: 2019-01-01 | Stop reason: HOSPADM

## 2019-01-01 RX ORDER — SODIUM CHLORIDE 9 MG/ML
INJECTION, SOLUTION INTRAVENOUS
Status: DISCONTINUED
Start: 2019-01-01 | End: 2019-01-01 | Stop reason: HOSPADM

## 2019-01-01 RX ORDER — MORPHINE SULFATE 15 MG/1
15 TABLET, FILM COATED, EXTENDED RELEASE ORAL EVERY 24 HOURS
Status: DISCONTINUED | OUTPATIENT
Start: 2019-01-01 | End: 2019-01-01

## 2019-01-01 RX ORDER — ONDANSETRON 4 MG/1
8 TABLET, FILM COATED ORAL 3 TIMES DAILY PRN
Status: DISCONTINUED | OUTPATIENT
Start: 2019-01-01 | End: 2019-01-01 | Stop reason: HOSPADM

## 2019-01-01 RX ORDER — SODIUM CHLORIDE, SODIUM LACTATE, POTASSIUM CHLORIDE, CALCIUM CHLORIDE 600; 310; 30; 20 MG/100ML; MG/100ML; MG/100ML; MG/100ML
75 INJECTION, SOLUTION INTRAVENOUS CONTINUOUS
Status: DISCONTINUED | OUTPATIENT
Start: 2019-01-01 | End: 2019-01-01

## 2019-01-01 RX ORDER — HYDROCODONE BITARTRATE AND ACETAMINOPHEN 10; 325 MG/1; MG/1
1 TABLET ORAL EVERY 6 HOURS PRN
Status: DISCONTINUED | OUTPATIENT
Start: 2019-01-01 | End: 2019-01-01

## 2019-01-01 RX ORDER — MORPHINE SULFATE 30 MG/1
15 TABLET ORAL EVERY 6 HOURS PRN
Status: DISCONTINUED | OUTPATIENT
Start: 2019-01-01 | End: 2019-01-01 | Stop reason: HOSPADM

## 2019-01-01 RX ORDER — SODIUM CHLORIDE 9 MG/ML
250 INJECTION, SOLUTION INTRAVENOUS ONCE
Status: DISCONTINUED | OUTPATIENT
Start: 2019-01-01 | End: 2019-01-01 | Stop reason: HOSPADM

## 2019-01-01 RX ORDER — BISACODYL 10 MG
10 SUPPOSITORY, RECTAL RECTAL DAILY PRN
Status: DISCONTINUED | OUTPATIENT
Start: 2019-01-01 | End: 2019-01-01 | Stop reason: HOSPADM

## 2019-01-01 RX ORDER — SUCRALFATE 1 G/1
1 TABLET ORAL
Qty: 120 TABLET | Refills: 1 | Status: SHIPPED | OUTPATIENT
Start: 2019-01-01 | End: 2019-01-01

## 2019-01-01 RX ORDER — HEPARIN SODIUM 1000 [USP'U]/ML
80 INJECTION, SOLUTION INTRAVENOUS; SUBCUTANEOUS AS NEEDED
Status: DISCONTINUED | OUTPATIENT
Start: 2019-01-01 | End: 2019-01-01

## 2019-01-01 RX ORDER — LABETALOL HYDROCHLORIDE 5 MG/ML
5 INJECTION, SOLUTION INTRAVENOUS
Status: DISCONTINUED | OUTPATIENT
Start: 2019-01-01 | End: 2019-01-01

## 2019-01-01 RX ORDER — FAMOTIDINE 20 MG/1
40 TABLET, FILM COATED ORAL DAILY
Status: DISCONTINUED | OUTPATIENT
Start: 2019-01-01 | End: 2019-01-01

## 2019-01-01 RX ORDER — DEXAMETHASONE 2 MG/1
2 TABLET ORAL TAKE AS DIRECTED
COMMUNITY
Start: 2019-01-01 | End: 2019-01-01 | Stop reason: SDUPTHER

## 2019-01-01 RX ORDER — HEPARIN SODIUM 1000 [USP'U]/ML
40 INJECTION, SOLUTION INTRAVENOUS; SUBCUTANEOUS AS NEEDED
Status: DISCONTINUED | OUTPATIENT
Start: 2019-01-01 | End: 2019-01-01

## 2019-01-01 RX ORDER — MORPHINE SULFATE 15 MG/1
15 TABLET, FILM COATED, EXTENDED RELEASE ORAL 3 TIMES DAILY
COMMUNITY
End: 2019-01-01 | Stop reason: HOSPADM

## 2019-01-01 RX ORDER — MEPERIDINE HYDROCHLORIDE 25 MG/ML
12.5 INJECTION INTRAMUSCULAR; INTRAVENOUS; SUBCUTANEOUS
Status: DISCONTINUED | OUTPATIENT
Start: 2019-01-01 | End: 2019-01-01

## 2019-01-01 RX ORDER — ONDANSETRON 2 MG/ML
4 INJECTION INTRAMUSCULAR; INTRAVENOUS ONCE
Status: COMPLETED | OUTPATIENT
Start: 2019-01-01 | End: 2019-01-01

## 2019-01-01 RX ORDER — MORPHINE SULFATE 30 MG/1
30 TABLET, FILM COATED, EXTENDED RELEASE ORAL EVERY 8 HOURS SCHEDULED
Qty: 90 TABLET | Refills: 0
Start: 2019-01-01

## 2019-01-01 RX ORDER — ACETAMINOPHEN 325 MG/1
650 TABLET ORAL EVERY 4 HOURS PRN
Status: DISCONTINUED | OUTPATIENT
Start: 2019-01-01 | End: 2019-01-01 | Stop reason: HOSPADM

## 2019-01-01 RX ORDER — ALPRAZOLAM 0.25 MG/1
0.25 TABLET ORAL 2 TIMES DAILY PRN
COMMUNITY
Start: 2019-01-01

## 2019-01-01 RX ORDER — PHENOL 1.4 %
600 AEROSOL, SPRAY (ML) MUCOUS MEMBRANE 2 TIMES DAILY WITH MEALS
Status: DISCONTINUED | OUTPATIENT
Start: 2019-01-01 | End: 2019-01-01 | Stop reason: HOSPADM

## 2019-01-01 RX ORDER — HYDROCODONE BITARTRATE AND ACETAMINOPHEN 10; 325 MG/1; MG/1
1 TABLET ORAL EVERY 8 HOURS PRN
Status: DISCONTINUED | OUTPATIENT
Start: 2019-01-01 | End: 2019-01-01

## 2019-01-01 RX ADMIN — PROPOFOL 50 MG: 10 INJECTION, EMULSION INTRAVENOUS at 15:38

## 2019-01-01 RX ADMIN — AMOXICILLIN AND CLAVULANATE POTASSIUM 1 TABLET: 875; 125 TABLET, FILM COATED ORAL at 21:32

## 2019-01-01 RX ADMIN — NICOTINE 1 PATCH: 21 PATCH, EXTENDED RELEASE TRANSDERMAL at 02:25

## 2019-01-01 RX ADMIN — SODIUM CHLORIDE, PRESERVATIVE FREE 3 ML: 5 INJECTION INTRAVENOUS at 20:26

## 2019-01-01 RX ADMIN — Medication: at 08:11

## 2019-01-01 RX ADMIN — SUCRALFATE 1 G: 1 TABLET ORAL at 21:32

## 2019-01-01 RX ADMIN — SUCRALFATE 1 G: 1 TABLET ORAL at 16:56

## 2019-01-01 RX ADMIN — TAZOBACTAM SODIUM AND PIPERACILLIN SODIUM 3.38 G: 375; 3 INJECTION, SOLUTION INTRAVENOUS at 00:26

## 2019-01-01 RX ADMIN — DEXAMETHASONE 8 MG: 4 TABLET ORAL at 08:35

## 2019-01-01 RX ADMIN — Medication 600 MG: at 18:19

## 2019-01-01 RX ADMIN — FUROSEMIDE 20 MG: 10 INJECTION, SOLUTION INTRAMUSCULAR; INTRAVENOUS at 12:48

## 2019-01-01 RX ADMIN — SUCRALFATE 1 G: 1 TABLET ORAL at 11:47

## 2019-01-01 RX ADMIN — Medication 600 MG: at 09:06

## 2019-01-01 RX ADMIN — Medication 750 MG: at 10:51

## 2019-01-01 RX ADMIN — Medication 750 MG: at 20:25

## 2019-01-01 RX ADMIN — Medication: at 18:13

## 2019-01-01 RX ADMIN — Medication: at 11:48

## 2019-01-01 RX ADMIN — IPRATROPIUM BROMIDE AND ALBUTEROL SULFATE 3 ML: 2.5; .5 SOLUTION RESPIRATORY (INHALATION) at 19:42

## 2019-01-01 RX ADMIN — IPRATROPIUM BROMIDE AND ALBUTEROL SULFATE 3 ML: 2.5; .5 SOLUTION RESPIRATORY (INHALATION) at 08:54

## 2019-01-01 RX ADMIN — IPRATROPIUM BROMIDE AND ALBUTEROL SULFATE 3 ML: 2.5; .5 SOLUTION RESPIRATORY (INHALATION) at 16:38

## 2019-01-01 RX ADMIN — HYDROCODONE BITARTRATE AND ACETAMINOPHEN 1 TABLET: 10; 325 TABLET ORAL at 00:09

## 2019-01-01 RX ADMIN — SODIUM CHLORIDE, PRESERVATIVE FREE 3 ML: 5 INJECTION INTRAVENOUS at 09:00

## 2019-01-01 RX ADMIN — ONDANSETRON 4 MG: 2 INJECTION, SOLUTION INTRAMUSCULAR; INTRAVENOUS at 15:10

## 2019-01-01 RX ADMIN — MORPHINE SULFATE 15 MG: 15 TABLET, EXTENDED RELEASE ORAL at 06:07

## 2019-01-01 RX ADMIN — HEPARIN SODIUM 18 UNITS/KG/HR: 10000 INJECTION, SOLUTION INTRAVENOUS at 18:04

## 2019-01-01 RX ADMIN — LACTULOSE 10 G: 10 SOLUTION ORAL at 22:29

## 2019-01-01 RX ADMIN — DEXAMETHASONE 8 MG: 4 TABLET ORAL at 08:38

## 2019-01-01 RX ADMIN — SODIUM CHLORIDE, PRESERVATIVE FREE 3 ML: 5 INJECTION INTRAVENOUS at 23:06

## 2019-01-01 RX ADMIN — Medication 250 MG: at 08:30

## 2019-01-01 RX ADMIN — Medication 600 MG: at 10:50

## 2019-01-01 RX ADMIN — DEXAMETHASONE 8 MG: 4 TABLET ORAL at 17:25

## 2019-01-01 RX ADMIN — Medication 750 MG: at 22:30

## 2019-01-01 RX ADMIN — POLYETHYLENE GLYCOL 3350 17 G: 17 POWDER, FOR SOLUTION ORAL at 08:48

## 2019-01-01 RX ADMIN — HEPARIN SODIUM 22 UNITS/KG/HR: 10000 INJECTION, SOLUTION INTRAVENOUS at 11:02

## 2019-01-01 RX ADMIN — IPRATROPIUM BROMIDE AND ALBUTEROL SULFATE 3 ML: 2.5; .5 SOLUTION RESPIRATORY (INHALATION) at 16:51

## 2019-01-01 RX ADMIN — PANTOPRAZOLE SODIUM 40 MG: 40 INJECTION, POWDER, FOR SOLUTION INTRAVENOUS at 11:03

## 2019-01-01 RX ADMIN — SENNOSIDES 17.2 MG: 8.6 TABLET, FILM COATED ORAL at 21:32

## 2019-01-01 RX ADMIN — SODIUM CHLORIDE, POTASSIUM CHLORIDE, SODIUM LACTATE AND CALCIUM CHLORIDE 30 ML/HR: 600; 310; 30; 20 INJECTION, SOLUTION INTRAVENOUS at 13:59

## 2019-01-01 RX ADMIN — MORPHINE SULFATE 15 MG: 15 TABLET, EXTENDED RELEASE ORAL at 14:26

## 2019-01-01 RX ADMIN — SODIUM CHLORIDE, PRESERVATIVE FREE 3 ML: 5 INJECTION INTRAVENOUS at 11:04

## 2019-01-01 RX ADMIN — TEMAZEPAM 15 MG: 15 CAPSULE ORAL at 21:33

## 2019-01-01 RX ADMIN — FAMOTIDINE 40 MG: 20 TABLET ORAL at 08:35

## 2019-01-01 RX ADMIN — Medication: at 17:25

## 2019-01-01 RX ADMIN — Medication: at 20:25

## 2019-01-01 RX ADMIN — Medication: at 12:43

## 2019-01-01 RX ADMIN — PANTOPRAZOLE SODIUM 40 MG: 40 INJECTION, POWDER, FOR SOLUTION INTRAVENOUS at 10:25

## 2019-01-01 RX ADMIN — Medication: at 21:31

## 2019-01-01 RX ADMIN — PANTOPRAZOLE SODIUM 40 MG: 40 INJECTION, POWDER, FOR SOLUTION INTRAVENOUS at 22:28

## 2019-01-01 RX ADMIN — LIDOCAINE 1 PATCH: 50 PATCH CUTANEOUS at 18:30

## 2019-01-01 RX ADMIN — IPRATROPIUM BROMIDE AND ALBUTEROL SULFATE 3 ML: 2.5; .5 SOLUTION RESPIRATORY (INHALATION) at 02:08

## 2019-01-01 RX ADMIN — IPRATROPIUM BROMIDE AND ALBUTEROL SULFATE 3 ML: 2.5; .5 SOLUTION RESPIRATORY (INHALATION) at 21:52

## 2019-01-01 RX ADMIN — SODIUM CHLORIDE, PRESERVATIVE FREE 3 ML: 5 INJECTION INTRAVENOUS at 08:50

## 2019-01-01 RX ADMIN — AMOXICILLIN AND CLAVULANATE POTASSIUM 1 TABLET: 875; 125 TABLET, FILM COATED ORAL at 08:35

## 2019-01-01 RX ADMIN — PROPOFOL 50 MG: 10 INJECTION, EMULSION INTRAVENOUS at 15:41

## 2019-01-01 RX ADMIN — SODIUM CHLORIDE, PRESERVATIVE FREE 3 ML: 5 INJECTION INTRAVENOUS at 22:23

## 2019-01-01 RX ADMIN — SODIUM CHLORIDE 500 ML: 9 INJECTION, SOLUTION INTRAVENOUS at 13:48

## 2019-01-01 RX ADMIN — SUCRALFATE 1 G: 1 TABLET ORAL at 20:25

## 2019-01-01 RX ADMIN — HEPARIN 500 UNITS: 100 SYRINGE at 13:52

## 2019-01-01 RX ADMIN — HYDROCODONE BITARTRATE AND ACETAMINOPHEN 1 TABLET: 10; 325 TABLET ORAL at 04:30

## 2019-01-01 RX ADMIN — SENNOSIDES 17.2 MG: 8.6 TABLET, FILM COATED ORAL at 20:25

## 2019-01-01 RX ADMIN — DEXAMETHASONE 8 MG: 4 TABLET ORAL at 10:25

## 2019-01-01 RX ADMIN — SODIUM CHLORIDE, PRESERVATIVE FREE 3 ML: 5 INJECTION INTRAVENOUS at 21:36

## 2019-01-01 RX ADMIN — MORPHINE SULFATE 15 MG: 15 TABLET, EXTENDED RELEASE ORAL at 06:03

## 2019-01-01 RX ADMIN — IOPAMIDOL 80 ML: 612 INJECTION, SOLUTION INTRAVENOUS at 14:38

## 2019-01-01 RX ADMIN — IPRATROPIUM BROMIDE AND ALBUTEROL SULFATE 3 ML: 2.5; .5 SOLUTION RESPIRATORY (INHALATION) at 13:22

## 2019-01-01 RX ADMIN — Medication: at 17:05

## 2019-01-01 RX ADMIN — DENOSUMAB 120 MG: 120 INJECTION SUBCUTANEOUS at 13:52

## 2019-01-01 RX ADMIN — FAMOTIDINE 40 MG: 20 TABLET ORAL at 08:48

## 2019-01-01 RX ADMIN — FUROSEMIDE 20 MG: 10 INJECTION, SOLUTION INTRAMUSCULAR; INTRAVENOUS at 08:38

## 2019-01-01 RX ADMIN — Medication 1 APPLICATION: at 10:50

## 2019-01-01 RX ADMIN — FLUDEOXYGLUCOSE F18 1 DOSE: 300 INJECTION INTRAVENOUS at 13:25

## 2019-01-01 RX ADMIN — TAZOBACTAM SODIUM AND PIPERACILLIN SODIUM 3.38 G: 375; 3 INJECTION, SOLUTION INTRAVENOUS at 00:23

## 2019-01-01 RX ADMIN — AZITHROMYCIN 250 MG: 250 TABLET, FILM COATED ORAL at 11:47

## 2019-01-01 RX ADMIN — Medication: at 08:32

## 2019-01-01 RX ADMIN — FOLIC ACID 1 MG: 1 TABLET ORAL at 08:31

## 2019-01-01 RX ADMIN — Medication 250 MG: at 21:32

## 2019-01-01 RX ADMIN — TAZOBACTAM SODIUM AND PIPERACILLIN SODIUM 3.38 G: 375; 3 INJECTION, SOLUTION INTRAVENOUS at 16:58

## 2019-01-01 RX ADMIN — PANTOPRAZOLE SODIUM 40 MG: 40 INJECTION, POWDER, FOR SOLUTION INTRAVENOUS at 20:26

## 2019-01-01 RX ADMIN — MORPHINE SULFATE 15 MG: 15 TABLET, EXTENDED RELEASE ORAL at 16:56

## 2019-01-01 RX ADMIN — SODIUM CHLORIDE, POTASSIUM CHLORIDE, SODIUM LACTATE AND CALCIUM CHLORIDE 75 ML/HR: 600; 310; 30; 20 INJECTION, SOLUTION INTRAVENOUS at 22:31

## 2019-01-01 RX ADMIN — Medication 600 MG: at 08:32

## 2019-01-01 RX ADMIN — HEPARIN SODIUM 23 UNITS/KG/HR: 10000 INJECTION, SOLUTION INTRAVENOUS at 04:07

## 2019-01-01 RX ADMIN — DEXAMETHASONE 8 MG: 4 TABLET ORAL at 08:31

## 2019-01-01 RX ADMIN — PANTOPRAZOLE SODIUM 40 MG: 40 INJECTION, POWDER, FOR SOLUTION INTRAVENOUS at 08:31

## 2019-01-01 RX ADMIN — SODIUM CHLORIDE 75 ML/HR: 9 INJECTION, SOLUTION INTRAVENOUS at 18:04

## 2019-01-01 RX ADMIN — HYDROCODONE BITARTRATE AND ACETAMINOPHEN 1 TABLET: 10; 325 TABLET ORAL at 18:03

## 2019-01-01 RX ADMIN — FAMOTIDINE 40 MG: 20 TABLET ORAL at 10:24

## 2019-01-01 RX ADMIN — PANTOPRAZOLE SODIUM 40 MG: 40 INJECTION, POWDER, FOR SOLUTION INTRAVENOUS at 08:34

## 2019-01-01 RX ADMIN — Medication: at 08:48

## 2019-01-01 RX ADMIN — Medication 600 MG: at 08:33

## 2019-01-01 RX ADMIN — NICOTINE 1 PATCH: 21 PATCH, EXTENDED RELEASE TRANSDERMAL at 08:51

## 2019-01-01 RX ADMIN — SUCRALFATE 1 G: 1 TABLET ORAL at 18:19

## 2019-01-01 RX ADMIN — ENOXAPARIN SODIUM 90 MG: 100 INJECTION SUBCUTANEOUS at 17:58

## 2019-01-01 RX ADMIN — HYDROMORPHONE HYDROCHLORIDE 1 MG: 1 INJECTION, SOLUTION INTRAMUSCULAR; INTRAVENOUS; SUBCUTANEOUS at 02:05

## 2019-01-01 RX ADMIN — SENNOSIDES 17.2 MG: 8.6 TABLET, FILM COATED ORAL at 08:48

## 2019-01-01 RX ADMIN — SODIUM CHLORIDE, POTASSIUM CHLORIDE, SODIUM LACTATE AND CALCIUM CHLORIDE 75 ML/HR: 600; 310; 30; 20 INJECTION, SOLUTION INTRAVENOUS at 16:08

## 2019-01-01 RX ADMIN — NICOTINE 1 PATCH: 21 PATCH, EXTENDED RELEASE TRANSDERMAL at 08:33

## 2019-01-01 RX ADMIN — TEMAZEPAM 15 MG: 15 CAPSULE ORAL at 21:32

## 2019-01-01 RX ADMIN — MORPHINE SULFATE 30 MG: 30 TABLET, EXTENDED RELEASE ORAL at 21:36

## 2019-01-01 RX ADMIN — PANTOPRAZOLE SODIUM 40 MG: 40 INJECTION, POWDER, FOR SOLUTION INTRAVENOUS at 21:31

## 2019-01-01 RX ADMIN — LACTULOSE 10 G: 10 SOLUTION ORAL at 10:25

## 2019-01-01 RX ADMIN — FOLIC ACID 1 MG: 1 TABLET ORAL at 11:21

## 2019-01-01 RX ADMIN — SODIUM CHLORIDE, POTASSIUM CHLORIDE, SODIUM LACTATE AND CALCIUM CHLORIDE: 600; 310; 30; 20 INJECTION, SOLUTION INTRAVENOUS at 15:37

## 2019-01-01 RX ADMIN — HYDROCODONE BITARTRATE AND ACETAMINOPHEN 1 TABLET: 10; 325 TABLET ORAL at 23:56

## 2019-01-01 RX ADMIN — POLYETHYLENE GLYCOL 3350 17 G: 17 POWDER, FOR SOLUTION ORAL at 10:25

## 2019-01-01 RX ADMIN — PANTOPRAZOLE SODIUM 40 MG: 40 INJECTION, POWDER, FOR SOLUTION INTRAVENOUS at 21:33

## 2019-01-01 RX ADMIN — Medication 750 MG: at 11:20

## 2019-01-01 RX ADMIN — IPRATROPIUM BROMIDE AND ALBUTEROL SULFATE 3 ML: 2.5; .5 SOLUTION RESPIRATORY (INHALATION) at 08:36

## 2019-01-01 RX ADMIN — PANTOPRAZOLE SODIUM 40 MG: 40 INJECTION, POWDER, FOR SOLUTION INTRAVENOUS at 11:05

## 2019-01-01 RX ADMIN — Medication: at 13:32

## 2019-01-01 RX ADMIN — Medication: at 11:57

## 2019-01-01 RX ADMIN — SODIUM CHLORIDE 200 ML/HR: 9 INJECTION, SOLUTION INTRAVENOUS at 15:10

## 2019-01-01 RX ADMIN — IPRATROPIUM BROMIDE AND ALBUTEROL SULFATE 3 ML: 2.5; .5 SOLUTION RESPIRATORY (INHALATION) at 12:36

## 2019-01-01 RX ADMIN — SODIUM CHLORIDE, PRESERVATIVE FREE 3 ML: 5 INJECTION INTRAVENOUS at 10:50

## 2019-01-01 RX ADMIN — IPRATROPIUM BROMIDE AND ALBUTEROL SULFATE 3 ML: 2.5; .5 SOLUTION RESPIRATORY (INHALATION) at 13:03

## 2019-01-01 RX ADMIN — MORPHINE SULFATE 15 MG: 30 TABLET ORAL at 16:05

## 2019-01-01 RX ADMIN — ENOXAPARIN SODIUM 90 MG: 100 INJECTION SUBCUTANEOUS at 11:48

## 2019-01-01 RX ADMIN — BISACODYL 10 MG: 5 TABLET, COATED ORAL at 08:38

## 2019-01-01 RX ADMIN — POLYETHYLENE GLYCOL 3350 17 G: 17 POWDER, FOR SOLUTION ORAL at 08:31

## 2019-01-01 RX ADMIN — TAZOBACTAM SODIUM AND PIPERACILLIN SODIUM 3.38 G: 375; 3 INJECTION, SOLUTION INTRAVENOUS at 08:39

## 2019-01-01 RX ADMIN — MORPHINE SULFATE 15 MG: 15 TABLET, FILM COATED, EXTENDED RELEASE ORAL at 13:58

## 2019-01-01 RX ADMIN — HYDROMORPHONE HYDROCHLORIDE 0.5 MG: 1 INJECTION, SOLUTION INTRAMUSCULAR; INTRAVENOUS; SUBCUTANEOUS at 08:40

## 2019-01-01 RX ADMIN — SENNOSIDES 17.2 MG: 8.6 TABLET, FILM COATED ORAL at 08:34

## 2019-01-01 RX ADMIN — IPRATROPIUM BROMIDE AND ALBUTEROL SULFATE 3 ML: .5; 3 SOLUTION RESPIRATORY (INHALATION) at 13:12

## 2019-01-01 RX ADMIN — MORPHINE SULFATE 2 MG: 4 INJECTION INTRAVENOUS at 15:11

## 2019-01-01 RX ADMIN — FOLIC ACID 1 MG: 1 TABLET ORAL at 08:35

## 2019-01-01 RX ADMIN — SODIUM CHLORIDE, POTASSIUM CHLORIDE, SODIUM LACTATE AND CALCIUM CHLORIDE 100 ML/HR: 600; 310; 30; 20 INJECTION, SOLUTION INTRAVENOUS at 16:37

## 2019-01-01 RX ADMIN — AMOXICILLIN AND CLAVULANATE POTASSIUM 1 TABLET: 875; 125 TABLET, FILM COATED ORAL at 08:30

## 2019-01-01 RX ADMIN — SODIUM CHLORIDE 75 ML/HR: 9 INJECTION, SOLUTION INTRAVENOUS at 23:06

## 2019-01-01 RX ADMIN — SODIUM CHLORIDE, POTASSIUM CHLORIDE, SODIUM LACTATE AND CALCIUM CHLORIDE 75 ML/HR: 600; 310; 30; 20 INJECTION, SOLUTION INTRAVENOUS at 13:42

## 2019-01-01 RX ADMIN — POLYETHYLENE GLYCOL 3350 17 G: 17 POWDER, FOR SOLUTION ORAL at 08:38

## 2019-01-01 RX ADMIN — FOLIC ACID 1 MG: 1 TABLET ORAL at 10:24

## 2019-01-01 RX ADMIN — SUCRALFATE 1 G: 1 TABLET ORAL at 22:30

## 2019-01-01 RX ADMIN — Medication 250 MG: at 08:34

## 2019-01-01 RX ADMIN — FAMOTIDINE 40 MG: 20 TABLET ORAL at 08:38

## 2019-01-01 RX ADMIN — HYDROCODONE BITARTRATE AND ACETAMINOPHEN 1 TABLET: 10; 325 TABLET ORAL at 10:51

## 2019-01-01 RX ADMIN — SUCRALFATE 1 G: 1 TABLET ORAL at 18:13

## 2019-01-01 RX ADMIN — MORPHINE SULFATE 15 MG: 30 TABLET ORAL at 15:47

## 2019-01-01 RX ADMIN — SUCRALFATE 1 G: 1 TABLET ORAL at 10:24

## 2019-01-01 RX ADMIN — Medication 600 MG: at 17:25

## 2019-01-01 RX ADMIN — SODIUM CHLORIDE, PRESERVATIVE FREE 500 UNITS: 5 INJECTION INTRAVENOUS at 15:22

## 2019-01-01 RX ADMIN — SUCRALFATE 1 G: 1 TABLET ORAL at 08:35

## 2019-01-01 RX ADMIN — MELATONIN TAB 5 MG 5 MG: 5 TAB at 00:23

## 2019-01-01 RX ADMIN — VANCOMYCIN HYDROCHLORIDE 1500 MG: 10 INJECTION, POWDER, LYOPHILIZED, FOR SOLUTION INTRAVENOUS at 08:38

## 2019-01-01 RX ADMIN — SENNOSIDES 17.2 MG: 8.6 TABLET, FILM COATED ORAL at 10:24

## 2019-01-01 RX ADMIN — TAZOBACTAM SODIUM AND PIPERACILLIN SODIUM 3.38 G: 375; 3 INJECTION, SOLUTION INTRAVENOUS at 08:49

## 2019-01-01 RX ADMIN — MORPHINE SULFATE 15 MG: 15 TABLET, EXTENDED RELEASE ORAL at 22:30

## 2019-01-01 RX ADMIN — Medication: at 22:28

## 2019-01-01 RX ADMIN — MORPHINE SULFATE 15 MG: 15 TABLET, FILM COATED, EXTENDED RELEASE ORAL at 13:31

## 2019-01-01 RX ADMIN — SENNOSIDES 17.2 MG: 8.6 TABLET, FILM COATED ORAL at 08:30

## 2019-01-01 RX ADMIN — HYDROCODONE BITARTRATE AND ACETAMINOPHEN 1 TABLET: 10; 325 TABLET ORAL at 16:49

## 2019-01-01 RX ADMIN — MORPHINE SULFATE 15 MG: 30 TABLET ORAL at 01:05

## 2019-01-01 RX ADMIN — POLYETHYLENE GLYCOL 3350 17 G: 17 POWDER, FOR SOLUTION ORAL at 08:34

## 2019-01-01 RX ADMIN — IPRATROPIUM BROMIDE AND ALBUTEROL SULFATE 3 ML: 2.5; .5 SOLUTION RESPIRATORY (INHALATION) at 12:13

## 2019-01-01 RX ADMIN — HYDROCODONE BITARTRATE AND ACETAMINOPHEN 1 TABLET: 10; 325 TABLET ORAL at 06:56

## 2019-01-01 RX ADMIN — MORPHINE SULFATE 15 MG: 30 TABLET ORAL at 08:59

## 2019-01-01 RX ADMIN — AMOXICILLIN AND CLAVULANATE POTASSIUM 1 TABLET: 875; 125 TABLET, FILM COATED ORAL at 12:48

## 2019-01-01 RX ADMIN — AZITHROMYCIN 250 MG: 250 TABLET, FILM COATED ORAL at 12:48

## 2019-01-01 RX ADMIN — DEXAMETHASONE 8 MG: 4 TABLET ORAL at 18:19

## 2019-01-01 RX ADMIN — DEXAMETHASONE 8 MG: 4 TABLET ORAL at 08:48

## 2019-01-01 RX ADMIN — HEPARIN SODIUM 18 UNITS/KG/HR: 10000 INJECTION, SOLUTION INTRAVENOUS at 14:34

## 2019-01-01 RX ADMIN — NICOTINE 1 PATCH: 21 PATCH, EXTENDED RELEASE TRANSDERMAL at 10:52

## 2019-01-01 RX ADMIN — MORPHINE SULFATE 30 MG: 30 TABLET, EXTENDED RELEASE ORAL at 14:03

## 2019-01-01 RX ADMIN — DEXAMETHASONE 8 MG: 4 TABLET ORAL at 17:05

## 2019-01-01 RX ADMIN — IPRATROPIUM BROMIDE AND ALBUTEROL SULFATE 3 ML: 2.5; .5 SOLUTION RESPIRATORY (INHALATION) at 20:05

## 2019-01-01 RX ADMIN — SUCRALFATE 1 G: 1 TABLET ORAL at 17:25

## 2019-01-01 RX ADMIN — LACTULOSE 10 G: 10 SOLUTION ORAL at 16:49

## 2019-01-01 RX ADMIN — DEXAMETHASONE 8 MG: 4 TABLET ORAL at 18:13

## 2019-01-01 RX ADMIN — IPRATROPIUM BROMIDE AND ALBUTEROL SULFATE 3 ML: 2.5; .5 SOLUTION RESPIRATORY (INHALATION) at 20:37

## 2019-01-01 RX ADMIN — MORPHINE SULFATE 30 MG: 30 TABLET, EXTENDED RELEASE ORAL at 21:32

## 2019-01-01 RX ADMIN — IPRATROPIUM BROMIDE AND ALBUTEROL SULFATE 3 ML: 2.5; .5 SOLUTION RESPIRATORY (INHALATION) at 13:39

## 2019-01-01 RX ADMIN — LIDOCAINE HYDROCHLORIDE 30 MG: 10 INJECTION, SOLUTION EPIDURAL; INFILTRATION; INTRACAUDAL; PERINEURAL at 15:37

## 2019-01-01 RX ADMIN — SODIUM CHLORIDE, POTASSIUM CHLORIDE, SODIUM LACTATE AND CALCIUM CHLORIDE 75 ML/HR: 600; 310; 30; 20 INJECTION, SOLUTION INTRAVENOUS at 03:02

## 2019-01-01 RX ADMIN — IPRATROPIUM BROMIDE AND ALBUTEROL SULFATE 3 ML: 2.5; .5 SOLUTION RESPIRATORY (INHALATION) at 08:53

## 2019-01-01 RX ADMIN — SENNOSIDES 17.2 MG: 8.6 TABLET, FILM COATED ORAL at 22:29

## 2019-01-01 RX ADMIN — NICOTINE 1 PATCH: 21 PATCH, EXTENDED RELEASE TRANSDERMAL at 08:34

## 2019-01-01 RX ADMIN — TAZOBACTAM SODIUM AND PIPERACILLIN SODIUM 3.38 G: 375; 3 INJECTION, SOLUTION INTRAVENOUS at 03:43

## 2019-01-01 RX ADMIN — IPRATROPIUM BROMIDE AND ALBUTEROL SULFATE 3 ML: 2.5; .5 SOLUTION RESPIRATORY (INHALATION) at 07:47

## 2019-01-01 RX ADMIN — FOLIC ACID 1 MG: 1 TABLET ORAL at 08:38

## 2019-01-01 RX ADMIN — SUCRALFATE 1 G: 1 TABLET ORAL at 21:33

## 2019-01-01 RX ADMIN — TAZOBACTAM SODIUM AND PIPERACILLIN SODIUM 3.38 G: 375; 3 INJECTION, SOLUTION INTRAVENOUS at 10:51

## 2019-01-01 RX ADMIN — IPRATROPIUM BROMIDE AND ALBUTEROL SULFATE 3 ML: 2.5; .5 SOLUTION RESPIRATORY (INHALATION) at 16:18

## 2019-01-01 RX ADMIN — MORPHINE SULFATE 15 MG: 30 TABLET ORAL at 01:59

## 2019-01-01 RX ADMIN — HYDROCODONE BITARTRATE AND ACETAMINOPHEN 1 TABLET: 10; 325 TABLET ORAL at 09:01

## 2019-01-01 RX ADMIN — Medication 250 MG: at 12:48

## 2019-01-01 RX ADMIN — IPRATROPIUM BROMIDE AND ALBUTEROL SULFATE 3 ML: 2.5; .5 SOLUTION RESPIRATORY (INHALATION) at 09:14

## 2019-01-01 RX ADMIN — Medication: at 18:19

## 2019-01-01 RX ADMIN — MORPHINE SULFATE 15 MG: 15 TABLET, EXTENDED RELEASE ORAL at 20:25

## 2019-01-01 RX ADMIN — SUCRALFATE 1 G: 1 TABLET ORAL at 08:48

## 2019-01-01 RX ADMIN — AZITHROMYCIN 250 MG: 250 TABLET, FILM COATED ORAL at 11:57

## 2019-01-01 RX ADMIN — Medication: at 08:34

## 2019-01-01 RX ADMIN — TAZOBACTAM SODIUM AND PIPERACILLIN SODIUM 3.38 G: 375; 3 INJECTION, SOLUTION INTRAVENOUS at 16:52

## 2019-01-01 RX ADMIN — SUCRALFATE 1 G: 1 TABLET ORAL at 12:48

## 2019-01-01 RX ADMIN — Medication 600 MG: at 18:13

## 2019-01-01 RX ADMIN — SUCRALFATE 1 G: 1 TABLET ORAL at 11:58

## 2019-01-01 RX ADMIN — SODIUM CHLORIDE, POTASSIUM CHLORIDE, SODIUM LACTATE AND CALCIUM CHLORIDE 75 ML/HR: 600; 310; 30; 20 INJECTION, SOLUTION INTRAVENOUS at 04:08

## 2019-01-01 RX ADMIN — MORPHINE SULFATE 15 MG: 15 TABLET, FILM COATED, EXTENDED RELEASE ORAL at 05:03

## 2019-01-01 RX ADMIN — IPRATROPIUM BROMIDE AND ALBUTEROL SULFATE 3 ML: 2.5; .5 SOLUTION RESPIRATORY (INHALATION) at 16:21

## 2019-01-01 RX ADMIN — LACTULOSE 10 G: 10 SOLUTION ORAL at 16:57

## 2019-01-01 RX ADMIN — SODIUM CHLORIDE 480 MG: 9 INJECTION, SOLUTION INTRAVENOUS at 13:14

## 2019-01-01 RX ADMIN — SUCRALFATE 1 G: 1 TABLET ORAL at 08:30

## 2019-01-01 RX ADMIN — MORPHINE SULFATE 15 MG: 15 TABLET, FILM COATED, EXTENDED RELEASE ORAL at 05:50

## 2019-01-01 RX ADMIN — SODIUM CHLORIDE 250 ML: 9 INJECTION, SOLUTION INTRAVENOUS at 20:11

## 2019-01-01 RX ADMIN — MORPHINE SULFATE 15 MG: 30 TABLET ORAL at 11:47

## 2019-01-01 RX ADMIN — SODIUM CHLORIDE, PRESERVATIVE FREE 3 ML: 5 INJECTION INTRAVENOUS at 22:32

## 2019-01-18 NOTE — PROGRESS NOTES
CHIEF COMPLAINT: Management adenocarcinoma the lung with bone metastases    Problem List:     Adenocarcinoma, lung, right (CMS/HCC)    8/10/2016 Initial Diagnosis     Adenocarcinoma of right lung, lower lobe, stage 1A status post right lower lobectomy September 2016.  On serial CT had new right upper lobe abnormality that was spiculated but actually decreased in size on May 2018 CT but nonetheless underwent bronchoscopy which showed level VII node involvement on 6/5/18.  No clear-cut disease at the left lower lobe stump.  CBC and CMP unremarkable.  Saw me for the first time 6/14/18.  This was completely asymptomatic.  I recommended PET scan and MRI of the brain.  Results are below with no extrathoracic metastasis.  I discussed the results of her 6/26/18 PET and MRI brain on 6/28/18 with Dr. Grey and he does not think that she would be eligible for resection.  Likewise, this known level VII node involvement on biopsy is under represented by the PET and we could not be certain that he had thoroughly treated this area.  Hence, he and I came to the mutual consensus that definitive chemotherapy and radiation would be her best bet at eradication though certainly not guarantee by any stretch.  She was subsequently educated on cisplatin and Alimta plus radiation and port will be placed by Dr. Ham.         6/26/2018 Imaging     Pet:  Impression:     1. Partially cavitary metabolic paramedian nodule posterior right lung  is concerning for second primary lesion or metastasis.  2. Hypermetabolic gagandeep activity is stable nonenlarged lymph nodes. With  the inflammatory changes in the upper lobes these might be reactive but  followup should be considered.  3. Nonspecific tiny focus of hypermetabolic activity posterior to the  thyroid gland. Attention on followup recommended     MRI brain negative for metastasis    CMP and CBC unremarkable save for alkaline phosphatase of 122         8/3/2018 - 9/14/2018 Chemotherapy     OP  LUNG PEMEtrexed / CISplatin + XRT           8/9/2018 - 9/26/2018 Radiation     Radiation OncologyTreatment Course:  Moriah Qiu received 6000 cGy in 30 fractions to right lung via External Beam Radiation - EBRT.         9/17/2018 Progression     CT chest IMPRESSION:  1. Continued increase in size of the paramedian subsolid lesion located  in what is the medial segment middle lobe (unusual location relative to  the spine is due to prior right lower lobectomy).  2. Grossly unchanged size of the mediastinal and right hilar lymph  nodes.  3. New sub solid nodule in the posterior aspect of the right upper lobe.  4. New osseous sclerotic metastases involving the imaged spine.           10/3/2018 Imaging     PET/CT IMPRESSION:  Mixed appearance of response to therapy with decreased  hypermetabolism within the anterior mediastinal lymph nodes and  decreased activity within the right lower lobe medial segment pulmonary  nodule indicating treatment response however noted increased sclerosis  and abnormal hypermetabolism within the sacral prominence and body  indicating progression of osseous metastatic involvement. Various other  sites of scattered sclerotic foci within the osseous structures however  no other sites of abnormal hypermetabolism associated with these  lesions.         10/19/2018 Imaging     MRI Brain IMPRESSION:  1.  No acute intracranial abnormality specifically, no evidence for  abnormal enhancement to suggest intracranial metastasis.  2.  Minimal amount of T2 and FLAIR increased signal abnormality within  the periventricular and deep white matter may represent early chronic  small vessel ischemic disease and/or sequela of migraines, however, no  associated abnormal enhancement or restriction.  MRI Thoracic spine  IMPRESSION:  Multilevel areas of abnormal hypoenhancing lesions  throughout the cervicothoracic spine as detailed above, greatest  involvement at T3, T6 and T11 levels, without pathologic  compression  deformity or retropulsion. The findings are consistent with osseous  metastasis, however, no intracanalicular involvement specifically, no  intramedullary or intradural abnormal enhancement to suggest thoracic  cord involvement or neuroforaminal/spinal canal stenosis. No significant  interval change in mid thoracic syrinx appearance from 2016 comparison  examination.            HISTORY OF PRESENT ILLNESS:  The patient is a 49 y.o. female, here for follow up on management of adenocarcinoma lung with bone metastases.  She has had her port replaced since we saw her last as her original port had to be removed due to erosion of the skin.  She has no infection.  New port is in place and she is feeling fairly well.  States that her appetite has been decreasing.  Back pain is a little worse, particularly at night when she lies down, she states that she changes position often and seems to do better in more of a fetal position.  She states the pain does not radiate, it is mid back.  No loss of bowel or bladder function.  Taking hydrocodone 10 mg about every 4 hours.  No fevers or chills.        Past Medical History:   Diagnosis Date   • Back pain    • COPD (chronic obstructive pulmonary disease) (CMS/HCC)     emphysema   • Esophagus hernia    • Frequent headaches    • Gall stone    • GERD (gastroesophageal reflux disease)    • History of stomach ulcers    • Lung cancer (CMS/HCC)    • Osteoarthritis (arthritis due to wear and tear of joints)    • Osteoporosis    • Uterine cancer (CMS/HCC) 1997    ovarian 1997   • Wears dentures      Past Surgical History:   Procedure Laterality Date   • BILATERAL OOPHORECTOMY     • BRONCHOSCOPY N/A 8/4/2016    Procedure: NAVIGATIONAL BRONCHOSCOPY;  Surgeon: Elder Tompkins MD;  Location:  [facility];  Service:    • BRONCHOSCOPY N/A 8/4/2016    Procedure: BRONCHOSCOPY WITH ENDOBRONCHIAL ULTRASOUND;  Surgeon: Elder Tompkins MD;  Location:  [facility];  Service:    •  BRONCHOSCOPY N/A 6/5/2018    Procedure: BRONCHOSCOPY WITH ENDOBRONCHIAL ULTRASOUND WITH FLUORO;  Surgeon: Kingston Carrasquillo MD;  Location:  [facility];  Service: Pulmonary   • COLONOSCOPY      2016   • GALLBLADDER SURGERY  2016   • HYSTERECTOMY  1997   • THORACOSCOPY Right 9/15/2016    Procedure: BRONOSCOPY, RIGHT THORACOSCOPY VIDEO ASSISTED LOBECTOMY, MEDIASTINAL LYMPH NODE DISECTION;  Surgeon: Elder Ham MD;  Location:  [facility];  Service:    • VENOUS ACCESS DEVICE (PORT) INSERTION N/A 7/20/2018    Procedure: PORT PLACEMENT;  Surgeon: Elder Ham MD;  Location:  [facility];  Service: Cardiothoracic   • VENOUS ACCESS DEVICE (PORT) INSERTION N/A 12/28/2018    Procedure: INSERTION VENOUS ACCESS DEVICE;  Surgeon: Elder Ham MD;  Location:  [facility];  Service: Cardiothoracic   • VENOUS ACCESS DEVICE (PORT) REMOVAL N/A 12/28/2018    Procedure: REMOVAL VENOUS ACCESS DEVICE;  Surgeon: Elder Ham MD;  Location:  [facility];  Service: Cardiothoracic       No Known Allergies    Family History and Social History reviewed and changed as necessary      REVIEW OF SYSTEM:   Review of Systems   Constitutional: Negative for chills, diaphoresis, fever and unexpected weight change. Positive for fatigue and decreased appetite.  HENT:   Negative for mouth sores, sore throat and trouble swallowing.    Eyes: Negative for icterus.   Respiratory: Negative for cough, hemoptysis and shortness of breath.    Cardiovascular: Negative for chest pain, leg swelling and palpitations.   Gastrointestinal: Negative for abdominal distention, abdominal pain, blood in stool, constipation, diarrhea, nausea and vomiting.   Endocrine: Negative for hot flashes.   Genitourinary: Negative for bladder incontinence, difficulty urinating, dysuria, frequency and hematuria.    Musculoskeletal: Positive for back pain.   Skin: Negative for rash.   Neurological: Negative for dizziness, gait problem, headaches,  "light-headedness and numbness.   Hematological: Negative for adenopathy. Does not bruise/bleed easily.   Psychiatric/Behavioral: Negative for depression. The patient is not nervous/anxious.    All other systems reviewed and are negative.       PHYSICAL EXAM    Vitals:    01/18/19 1117   BP: 97/66   Pulse: 101   Resp: 16   Temp: 97.8 °F (36.6 °C)   SpO2: 94%   Weight: 57.2 kg (126 lb)   Height: 167.6 cm (66\")     Constitutional: Frail, chronically ill appearing female, petite. No distress.   ECOG: (2) Ambulatory and capable of self care, unable to carry out work activity, up and about > 50% or waking hours  HENT:   Head: Normocephalic.   Mouth/Throat: Oropharynx is clear and moist.   Eyes: Conjunctivae are normal. Pupils are equal, round, and reactive. No scleral icterus.   Neck: Neck supple. No JVD present.   Cardiovascular: Normal rate, regular rhythm and normal heart sounds.    Pulmonary/Chest: Breath sounds normal. No respiratory distress.   Abdominal: Soft. Exhibits no distension.   Musculoskeletal:Exhibits no edema, tenderness or deformity.   Neurological: Alert and oriented to person, place, and time.   Skin: No ecchymosis, no petechiae and no rash noted. Not diaphoretic. No cyanosis.    Psychiatric: Normal mood and affect.   Vitals reviewed.  Labs reviewed.    No radiology results for the last 7 days          ASSESSMENT & PLAN:    1. Adenocarcinoma lung  2. Probable bone metastases radiographically  3. Cancer related  4. Anorexia    Discussion: Tolerating therapy with Opdivo overall fairly well.  Labs are stable, no endocrinopathies.  Back pain is increasing in intensity somewhat, I will start her on long-acting morphine sulfate 15 mg every 12 hours, she will continue hydrocodone 10 mg every 4-6 hours for breakthrough pain.  Prescription was sent electronically for the MS Contin, she did not need a refill of hydrocodone at this time.  I asked her to call us in one week if she is not getting good pain relief " and we will adjust her medications appropriately.  I also encouraged her to eat more small frequent meals throughout the day and use protein shake supplements to try and maintain her weight.  We will see her back in 1 month for follow-up and certainly sooner if needed.  We will repeat her PET/CT prior to return to assess for response and further treatment planning.    I spent 25 minutes with the patient. I spent > 50% percent of this time counseling and discussing prognosis, diagnostic testing, evaluation, current status and management.    Renu Sanders, APRN    01/18/2019

## 2019-01-18 NOTE — ADDENDUM NOTE
Encounter addended by: Georgina Yen RN on: 1/18/2019 2:17 PM   Actions taken: Diagnosis association updated, Order list changed, MAR administration accepted

## 2019-02-15 NOTE — PROGRESS NOTES
CHIEF COMPLAINT: Progression of adenocarcinoma lung    Problem List:     Adenocarcinoma, lung, right (CMS/HCC)    8/10/2016 Initial Diagnosis     Adenocarcinoma of right lung, lower lobe, stage 1A status post right lower lobectomy September 2016.  On serial CT had new right upper lobe abnormality that was spiculated but actually decreased in size on May 2018 CT but nonetheless underwent bronchoscopy which showed level VII node involvement on 6/5/18.  No clear-cut disease at the right lower lobe stump.  CBC and CMP unremarkable.  Saw me for the first time 6/14/18.  This was completely asymptomatic.  I recommended PET scan and MRI of the brain.  Results are below with no extrathoracic metastasis.  I discussed the results of her 6/26/18 PET and MRI brain on 6/28/18 with Dr. Grey and he does not think that she would be eligible for resection.  Likewise, this known level VII node involvement on biopsy is under represented by the PET and we could not be certain that he had thoroughly treated this area.  Hence, he and I came to the mutual consensus that definitive chemotherapy and radiation would be her best bet at eradication though certainly not guarantee by any stretch.  She was subsequently educated on cisplatin and Alimta plus radiation and port will be placed by Dr. Ham.         6/26/2018 Imaging     Pet:  Impression:     1. Partially cavitary metabolic paramedian nodule posterior right lung  is concerning for second primary lesion or metastasis.  2. Hypermetabolic gagandeep activity is stable nonenlarged lymph nodes. With  the inflammatory changes in the upper lobes these might be reactive but  followup should be considered.  3. Nonspecific tiny focus of hypermetabolic activity posterior to the  thyroid gland. Attention on followup recommended     MRI brain negative for metastasis    CMP and CBC unremarkable save for alkaline phosphatase of 122         8/3/2018 - 9/14/2018 Chemotherapy     OP LUNG PEMEtrexed /  CISplatin + XRT           8/9/2018 - 9/26/2018 Radiation     Radiation OncologyTreatment Course:  Moriah Qiu received 6000 cGy in 30 fractions to right lung via External Beam Radiation - EBRT.         9/17/2018 Progression     CT chest IMPRESSION:  1. Continued increase in size of the paramedian subsolid lesion located  in what is the medial segment middle lobe (unusual location relative to  the spine is due to prior right lower lobectomy).  2. Grossly unchanged size of the mediastinal and right hilar lymph  nodes.  3. New sub solid nodule in the posterior aspect of the right upper lobe.  4. New osseous sclerotic metastases involving the imaged spine.           10/3/2018 Imaging     PET/CT IMPRESSION:  Mixed appearance of response to therapy with decreased  hypermetabolism within the anterior mediastinal lymph nodes and  decreased activity within the right lower lobe medial segment pulmonary  nodule indicating treatment response however noted increased sclerosis  and abnormal hypermetabolism within the sacral prominence and body  indicating progression of osseous metastatic involvement. Various other  sites of scattered sclerotic foci within the osseous structures however  no other sites of abnormal hypermetabolism associated with these  lesions.         10/19/2018 Imaging     MRI Brain IMPRESSION:  1.  No acute intracranial abnormality specifically, no evidence for  abnormal enhancement to suggest intracranial metastasis.  2.  Minimal amount of T2 and FLAIR increased signal abnormality within  the periventricular and deep white matter may represent early chronic  small vessel ischemic disease and/or sequela of migraines, however, no  associated abnormal enhancement or restriction.  MRI Thoracic spine  IMPRESSION:  Multilevel areas of abnormal hypoenhancing lesions  throughout the cervicothoracic spine as detailed above, greatest  involvement at T3, T6 and T11 levels, without pathologic compression  deformity or  retropulsion. The findings are consistent with osseous  metastasis, however, no intracanalicular involvement specifically, no  intramedullary or intradural abnormal enhancement to suggest thoracic  cord involvement or neuroforaminal/spinal canal stenosis. No significant  interval change in mid thoracic syrinx appearance from 2016 comparison  examination.         10/26/2018 - 2/14/2019 Immunotherapy     Treatment Plan: OP LUNG Nivolumab 480 mg  x 3 courses         2/7/2019 Progression     PET/CT IMPRESSION:  There has been dramatic progression of disease with  innumerable bony areas of hypermetabolic activity. The prior examination  had rare bony lesions involving left rib, right rib and left femoral  head. There was a small hypermetabolic nodule in the right lower lobe  medially which is slightly larger on this examination but has not  changed in metabolic activity.    Commensurate with the bony progression is a rise in her alkaline phosphatase to 377         2/15/2019 -  Other Event       Caris MI profile from specimen of 9/15/16 showed a high mutational burden negative for ALK, Ros 1, EGFR was a variant of unknown significance G378C, K-fina exon 2 G 12 F mutated, the rash not mutated, HER-2/jez not mutated, met not mutated, ret not mutated. ntrk not mutated.  Mismatch repair proficient..            HISTORY OF PRESENT ILLNESS:  The patient is a 49 y.o. female, here for follow up on management of progressive adenocarcinoma of the lung in the bones.  I reviewed her PET scan and images thereof.  This is developed progressive disease in her bones despite 3 courses of Opdivo      Past Medical History:   Diagnosis Date   • Back pain    • COPD (chronic obstructive pulmonary disease) (CMS/HCC)     emphysema   • Esophagus hernia    • Frequent headaches    • Gall stone    • GERD (gastroesophageal reflux disease)    • History of stomach ulcers    • Lung cancer (CMS/HCC)    • Osteoarthritis (arthritis due to wear and tear of  joints)    • Osteoporosis    • Uterine cancer (CMS/HCC) 1997    ovarian 1997   • Wears dentures      Past Surgical History:   Procedure Laterality Date   • BILATERAL OOPHORECTOMY     • BRONCHOSCOPY N/A 8/4/2016    Procedure: NAVIGATIONAL BRONCHOSCOPY;  Surgeon: Elder Tompkins MD;  Location:  [facility];  Service:    • BRONCHOSCOPY N/A 8/4/2016    Procedure: BRONCHOSCOPY WITH ENDOBRONCHIAL ULTRASOUND;  Surgeon: Elder Tompkins MD;  Location:  [facility];  Service:    • BRONCHOSCOPY N/A 6/5/2018    Procedure: BRONCHOSCOPY WITH ENDOBRONCHIAL ULTRASOUND WITH FLUORO;  Surgeon: Kingston Carrasquillo MD;  Location:  [facility];  Service: Pulmonary   • COLONOSCOPY      2016   • GALLBLADDER SURGERY  2016   • HYSTERECTOMY  1997   • THORACOSCOPY Right 9/15/2016    Procedure: BRONOSCOPY, RIGHT THORACOSCOPY VIDEO ASSISTED LOBECTOMY, MEDIASTINAL LYMPH NODE DISECTION;  Surgeon: Elder Ham MD;  Location:  [facility];  Service:    • VENOUS ACCESS DEVICE (PORT) INSERTION N/A 7/20/2018    Procedure: PORT PLACEMENT;  Surgeon: Elder Ham MD;  Location:  [facility];  Service: Cardiothoracic   • VENOUS ACCESS DEVICE (PORT) INSERTION N/A 12/28/2018    Procedure: INSERTION VENOUS ACCESS DEVICE;  Surgeon: Elder Ham MD;  Location:  [facility];  Service: Cardiothoracic   • VENOUS ACCESS DEVICE (PORT) REMOVAL N/A 12/28/2018    Procedure: REMOVAL VENOUS ACCESS DEVICE;  Surgeon: Elder Ham MD;  Location:  [facility];  Service: Cardiothoracic       No Known Allergies    Family History and Social History reviewed and changed as necessary      REVIEW OF SYSTEM:   Review of Systems   Constitutional: Negative for appetite change, chills, diaphoresis, fatigue, fever and unexpected weight change.   HENT:   Negative for mouth sores, sore throat and trouble swallowing.    Eyes: Negative for icterus.   Respiratory: Negative for cough, hemoptysis and shortness of breath.    Cardiovascular:  "Negative for chest pain, leg swelling and palpitations.   Gastrointestinal: Negative for abdominal distention, abdominal pain, blood in stool, constipation, diarrhea, nausea and vomiting.   Endocrine: Negative for hot flashes.   Genitourinary: Negative for bladder incontinence, difficulty urinating, dysuria, frequency and hematuria.    Musculoskeletal: Negative for gait problem, neck pain and neck stiffness.   Skin: Negative for rash.   Neurological: Negative for dizziness, gait problem, headaches, light-headedness and numbness.   Hematological: Negative for adenopathy. Does not bruise/bleed easily.   Psychiatric/Behavioral: Negative for depression. The patient is not nervous/anxious.    All other systems reviewed and are negative.       PHYSICAL EXAM    Vitals:    02/15/19 0943   BP: 103/71   Pulse: 101   Resp: 16   Temp: 98.7 °F (37.1 °C)   SpO2: 96%   Weight: 54 kg (119 lb)   Height: 167.6 cm (66\")     Constitutional: Appears well-developed and well-nourished. No distress.   ECOG: (2) Ambulatory and capable of self care, unable to carry out work activity, up and about > 50% or waking hours  HENT:   Head: Normocephalic.   Mouth/Throat: Oropharynx is clear and moist.   Eyes: Conjunctivae are normal. Pupils are equal, round, and reactive to light. No scleral icterus.   Neck: Neck supple. No JVD present. No thyromegaly present.   Cardiovascular: Normal rate, regular rhythm and normal heart sounds.    Pulmonary/Chest: Breath sounds normal. No respiratory distress.   Abdominal: Soft. Exhibits no distension and no mass. There is no hepatosplenomegaly. There is no tenderness. There is no rebound and no guarding.   Musculoskeletal:Exhibits no edema, tenderness or deformity.   Neurological: Alert and oriented to person, place, and time. Exhibits normal muscle tone.   Skin: No ecchymosis, no petechiae and no rash noted. Not diaphoretic. No cyanosis. Nails show no clubbing.   Psychiatric: Normal mood and affect.   Vitals " reviewed.      Lab Results   Component Value Date    HGB 10.6 (L) 02/07/2019    HCT 34.5 02/07/2019    .7 (H) 02/07/2019     (L) 02/07/2019    WBC 5.17 02/07/2019    NEUTROABS 3.75 02/07/2019    LYMPHSABS 0.78 02/07/2019    MONOSABS 0.43 02/07/2019    EOSABS 0.06 02/07/2019    BASOSABS 0.04 02/07/2019       Lab Results   Component Value Date    GLUCOSE 89 02/07/2019    BUN 13 02/07/2019    CREATININE 0.74 02/07/2019     02/07/2019    K 4.5 02/07/2019     02/07/2019    CO2 22.0 02/07/2019    CALCIUM 8.5 (L) 02/07/2019    PROTEINTOT 6.2 02/07/2019    ALBUMIN 3.95 02/07/2019    BILITOT 0.2 (L) 02/07/2019    ALKPHOS 377 (H) 02/07/2019    AST 23 02/07/2019    ALT 7 02/07/2019                   ASSESSMENT & PLAN:    1.  Adenocarcinoma the lung  2. Bone metastases progressing    Discussion: Opdivo was not working.  Not eligible for clinical trial as there was never a response.  Reviewed Caris MI profile and PET scan results with her.  No actionable targets.  We'll try Taxotere Cyramza and she is going to think about that while we are getting the prior authorization.  We'll see her back in 2 weeks to start.  Discussed with patient 45 minutes greater than 50% spent counseling      Segundo Patterson MD    02/15/2019

## 2019-02-21 NOTE — TELEPHONE ENCOUNTER
Called patient to follow up.  Palliative care will manage pain medications.  They advised her to increase MS Contin to 15 mg TID.

## 2019-02-21 NOTE — TELEPHONE ENCOUNTER
Patient called stating pain medication isn't working.  On MS Contin 15 mg BID and norco 10/325 prescribed by palliative care.  She states she takes 1 every 4 hours, but they told her she can take 1 every 2 hours.  Palliative care comes to see her in her home and told her they would prescribe her pain medications per patient report.  Advised patient to contact palliative care for adjustment.  I asked that she call me back if they are not making adjustments or writing her prescriptions and we can make changes to her medications.  Verbalized understanding.

## 2019-03-14 PROBLEM — D64.9 PROFOUND ANEMIA: Status: ACTIVE | Noted: 2019-01-01

## 2019-03-14 NOTE — ED NOTES
Dr Capellan speaks with Bettye from lab about need for specimen redraw.  AMILCAR Helms Carrie Elizabeth, PALOMO  03/14/19 [id number]

## 2019-03-14 NOTE — ED NOTES
PT is on a waiting list for a bed at [facility]. Nicolas will update us hourly on the bed status.     Rose Mary Martinez  03/14/19 [id number]

## 2019-03-14 NOTE — ED NOTES
[facility] approved ALS transfer to [facility]. They have a 30-45 minute ETA.     Rose Mary Martinez  03/14/19 [id number]

## 2019-03-14 NOTE — ED NOTES
[facility]. Waiting on hospitalist to call back and consult with Dr. Capellan.     Rose Mary Martinez  03/14/19 2026

## 2019-03-14 NOTE — ED NOTES
Report given to Verito Hagen RN [facility]   Patient Room N 630 [phone]     Renetta Pak RN  03/14/19 5557

## 2019-03-14 NOTE — ED PROVIDER NOTES
Subjective   Patient 49-year-old female with known lung cancer that is metastatic throughout her bones.  She is finished her chemo for her lung cancer and is about to undergo radiation for palliative care bone pain.  She recently diagnosed with a DVT in her left leg and is on Xarelto.  The last 2-3 days she has had increasing shortness of breath weakness hardly can keep her awake and headache loss of appetite and low blood pressure        History provided by:  Patient   used: No    Weakness - Generalized   Severity:  Moderate  Onset quality:  Gradual  Timing:  Constant  Progression:  Worsening  Chronicity:  Chronic  Relieved by:  Nothing  Worsened by:  Activity  Ineffective treatments:  Lying down, rest and sleep  Associated symptoms: dizziness, nausea and shortness of breath    Associated symptoms: no abdominal pain, no chest pain, no dysuria and no fever        Review of Systems   Constitutional: Negative for fever.   HENT: Negative.    Respiratory: Positive for shortness of breath.    Cardiovascular: Negative.  Negative for chest pain.   Gastrointestinal: Positive for nausea. Negative for abdominal pain.   Endocrine: Negative.    Genitourinary: Negative.  Negative for dysuria.   Skin: Negative.    Neurological: Positive for dizziness and weakness.   Psychiatric/Behavioral: Negative.    All other systems reviewed and are negative.      Past Medical History:   Diagnosis Date   • Back pain    • COPD (chronic obstructive pulmonary disease) (CMS/HCC)     emphysema   • Esophagus hernia    • Frequent headaches    • Gall stone    • GERD (gastroesophageal reflux disease)    • History of stomach ulcers    • Lung cancer (CMS/HCC)    • Osteoarthritis (arthritis due to wear and tear of joints)    • Osteoporosis    • Uterine cancer (CMS/HCC) 1997    ovarian 1997   • Wears dentures        No Known Allergies    Past Surgical History:   Procedure Laterality Date   • BILATERAL OOPHORECTOMY     • BRONCHOSCOPY  N/A 8/4/2016    Procedure: NAVIGATIONAL BRONCHOSCOPY;  Surgeon: Elder Tompkins MD;  Location:  [facility];  Service:    • BRONCHOSCOPY N/A 8/4/2016    Procedure: BRONCHOSCOPY WITH ENDOBRONCHIAL ULTRASOUND;  Surgeon: Elder Tompkins MD;  Location:  [facility];  Service:    • BRONCHOSCOPY N/A 6/5/2018    Procedure: BRONCHOSCOPY WITH ENDOBRONCHIAL ULTRASOUND WITH FLUORO;  Surgeon: Kingston Carrasquillo MD;  Location:  [facility];  Service: Pulmonary   • COLONOSCOPY      2016   • GALLBLADDER SURGERY  2016   • HYSTERECTOMY  1997   • THORACOSCOPY Right 9/15/2016    Procedure: BRONOSCOPY, RIGHT THORACOSCOPY VIDEO ASSISTED LOBECTOMY, MEDIASTINAL LYMPH NODE DISECTION;  Surgeon: Elder Ham MD;  Location:  [facility];  Service:    • VENOUS ACCESS DEVICE (PORT) INSERTION N/A 7/20/2018    Procedure: PORT PLACEMENT;  Surgeon: Elder Ham MD;  Location:  [facility];  Service: Cardiothoracic   • VENOUS ACCESS DEVICE (PORT) INSERTION N/A 12/28/2018    Procedure: INSERTION VENOUS ACCESS DEVICE;  Surgeon: Elder Ham MD;  Location:  [facility];  Service: Cardiothoracic   • VENOUS ACCESS DEVICE (PORT) REMOVAL N/A 12/28/2018    Procedure: REMOVAL VENOUS ACCESS DEVICE;  Surgeon: Elder Ham MD;  Location:  [facility];  Service: Cardiothoracic       Family History   Problem Relation Age of Onset   • Cancer Mother    • Alzheimer's disease Father    • Osteoarthritis Other        Social History     Socioeconomic History   • Marital status:      Spouse name: NANCY   • Number of children: 3   • Years of education: Not on file   • Highest education level: Not on file   Occupational History   • Occupation: NOT EMPLOYED      Comment: Previous Contractor for [employer]   Tobacco Use   • Smoking status: Current Every Day Smoker     Packs/day: 1.00     Years: 28.00     Pack years: 28.00     Types: Cigarettes   • Smokeless tobacco: Never Used   Substance and Sexual Activity   •  Alcohol use: No   • Drug use: No   • Sexual activity: Defer   Social History Narrative    Lives in [place], [place] with .     Adelaide. Sister with today           Objective   Physical Exam   Constitutional: She is oriented to person, place, and time. She appears well-developed and well-nourished. She appears ill. No distress.   HENT:   Head: Normocephalic and atraumatic.   Right Ear: External ear normal.   Left Ear: External ear normal.   Nose: Nose normal.   Eyes: Conjunctivae and EOM are normal. Pupils are equal, round, and reactive to light.   Neck: Normal range of motion. Neck supple. No JVD present. No tracheal deviation present.   Cardiovascular: Normal rate, regular rhythm and normal heart sounds.   No murmur heard.  Pulmonary/Chest: Effort normal and breath sounds normal. No respiratory distress. She has no wheezes.   Abdominal: Soft. Bowel sounds are normal. There is no tenderness.   Musculoskeletal: Normal range of motion. She exhibits no deformity.        Left lower leg: She exhibits tenderness and edema.   Neurological: She is alert and oriented to person, place, and time. No cranial nerve deficit.   Skin: Skin is warm and dry. No rash noted. She is not diaphoretic. No erythema. There is pallor.   Psychiatric: She has a normal mood and affect. Her behavior is normal. Thought content normal.   Nursing note and vitals reviewed.      Procedures        Lab Results (last 24 hours)     Procedure Component Value Units Date/Time    Influenza Antigen, Rapid - Swab, Nasopharynx [[id number]]  (Normal) Collected:  03/14/19 1340    Specimen:  Swab from Nasopharynx Updated:  03/14/19 1406     Influenza A Ag, EIA Negative     Influenza B Ag, EIA Negative    Protime-INR [[id number]]  (Abnormal) Collected:  03/14/19 1344    Specimen:  Blood Updated:  03/14/19 1420     Protime 19.1 Seconds      INR 1.58    Narrative:       Suggested INR therapeutic range for stable oral anticoagulant therapy:    Low Intensity therapy:    1.5-2.0  Moderate Intensity therapy:   2.0-3.0  High Intensity therapy:   2.5-4.0    aPTT [[id number]]  (Abnormal) Collected:  03/14/19 1344    Specimen:  Blood Updated:  03/14/19 1420     PTT 36.5 seconds     Narrative:       PTT Heparin Therapeutic Range:  59 - 95 seconds    CBC & Differential [[id number]] Collected:  03/14/19 1435    Specimen:  Blood Updated:  03/14/19 1529    Narrative:       The following orders were created for panel order CBC & Differential.  Procedure                               Abnormality         Status                     ---------                               -----------         ------                     Scan Slide[[id number]]                                       Final result               CBC Auto Differential[[id number]]        Abnormal            Final result                 Please view results for these tests on the individual orders.    BNP [[id number]]  (Abnormal) Collected:  03/14/19 1435    Specimen:  Blood Updated:  03/14/19 1535     proBNP 3,499.0 pg/mL     Narrative:       Among patients with dyspnea, NT-proBNP is highly sensitive for the detection of acute congestive heart failure. In addition NT-proBNP of <300 pg/ml effectively rules out acute congestive heart failure with 99% negative predictive value.    Troponin [[id number]]  (Abnormal) Collected:  03/14/19 1435    Specimen:  Blood Updated:  03/14/19 1537     Troponin T 0.090 ng/mL     Narrative:       Troponin T Reference Range:  <= 0.03 ng/mL-   Negative for AMI  >0.03 ng/mL-     Abnormal for myocardial necrosis.  Clinicians would have to utilize clinical acumen, EKG, Troponin and serial changes to determine if it is an Acute Myocardial Infarction or myocardial injury due to an underlying chronic condition.     CBC Auto Differential [[id number]]  (Abnormal) Collected:  03/14/19 1435    Specimen:  Blood Updated:  03/14/19 1529     WBC 7.58 10*3/mm3      Comment: WBC corrected for presence of NRBC's  Modified report.  Previous result was 8.64 10*3/mm3 on 3/14/2019 at 1456 EDT.        RBC 1.04 10*6/mm3      Hemoglobin 3.5 g/dL      Hematocrit 12.2 %      .3 fL      MCH 33.7 pg      MCHC 28.7 g/dL      RDW 24.3 %      RDW-SD 95.6 fl      MPV 11.2 fL      Platelets 38 10*3/mm3     Comprehensive Metabolic Panel [[id number]]  (Abnormal) Collected:  03/14/19 1435    Specimen:  Blood Updated:  03/14/19 1544     Glucose 110 mg/dL      BUN 21 mg/dL      Creatinine 0.70 mg/dL      Sodium 133 mmol/L      Potassium 4.5 mmol/L      Chloride 105 mmol/L      CO2 14.6 mmol/L      Calcium 7.0 mg/dL      Total Protein 5.3 g/dL      Albumin 3.07 g/dL      ALT (SGPT) 10 U/L      AST (SGOT) 33 U/L      Alkaline Phosphatase 439 U/L      Total Bilirubin 0.4 mg/dL      eGFR Non African Amer 89 mL/min/1.73      Globulin 2.2 gm/dL      A/G Ratio 1.4 g/dL      BUN/Creatinine Ratio 30.0     Anion Gap 13.4 mmol/L     Narrative:       GFR Normal >60  Chronic Kidney Disease <60  Kidney Failure <15    Scan Slide [[id number]] Collected:  03/14/19 1435    Specimen:  Blood Updated:  03/14/19 1529     Scan Slide --     Comment: See Manual Differential Results       Manual Differential [[id number]]  (Abnormal) Collected:  03/14/19 1435    Specimen:  Blood Updated:  03/14/19 1529     Neutrophil % 68.0 %      Lymphocyte % 14.0 %      Monocyte % 5.0 %      Eosinophil % 1.0 %      Bands %  6.0 %      Metamyelocyte % 3.0 %      Myelocyte % 3.0 %      Neutrophils Absolute 5.61 10*3/mm3      Lymphocytes Absolute 1.06 10*3/mm3      Monocytes Absolute 0.38 10*3/mm3      Eosinophils Absolute 0.08 10*3/mm3      nRBC 14.0 /100 WBC      Anisocytosis Large/3+     Dacrocytes Slight/1+     Hypochromia Large/3+     Macrocytes Large/3+     Polychromasia Large/3+     Schistocytes Slight/1+     Platelet Estimate Decreased    Lactic Acid, Plasma [[id number]]  (Normal) Collected:  03/14/19 1517    Specimen:  Blood Updated:  03/14/19 1548     Lactate 1.5 mmol/L         CT Head With &  Without Contrast   Final Result   No acute intracranial abnormality. This is somewhat   limited for small or tiny enhancing lesions and MRI would be useful for   further evaluation.       This report was finalized on 3/14/2019 2:54 PM by Dr. Jaime Parsons MD.          XR Chest 1 View   Final Result   COARSENED INTERSTITIAL MARKINGS NOTED THROUGHOUT. LEFT   PORT-A-CATH WITH TIP IN SVC. RIGHT LOWER LOBE AIRSPACE DISEASE.       This report was finalized on 3/14/2019 1:44 PM by Dr. Jaime Parsons MD.                  ED Course      Discussed with Dr. Eastman and will admit 1515.  Dr. Hart's came and saw patient and discovered she had been having daily nosebleeds last nosebleed was early this morning no signs of bleeding at this time.  But felt uncomfortable keeping her with no ENT coverage..  [facility]ckinney at [facility] patient will go ahead and give blood while awaiting bed availability            MDM      Final diagnoses:   Symptomatic anemia            Francis Capellan MD  03/14/19 0191

## 2019-03-14 NOTE — ED NOTES
[facility] waiting on [facility] to return call.     Rose Mary Martinez  03/14/19 8131

## 2019-03-15 PROBLEM — D62 ACUTE BLOOD LOSS ANEMIA: Status: ACTIVE | Noted: 2019-01-01

## 2019-03-15 PROBLEM — I95.9 HYPOTENSION: Status: ACTIVE | Noted: 2019-01-01

## 2019-03-15 PROBLEM — D69.6 THROMBOCYTOPENIA (HCC): Status: ACTIVE | Noted: 2019-01-01

## 2019-03-15 PROBLEM — R77.8 ELEVATED TROPONIN: Status: ACTIVE | Noted: 2019-01-01

## 2019-03-15 PROBLEM — I75.022 BLUE TOE SYNDROME OF LEFT LOWER EXTREMITY (HCC): Status: ACTIVE | Noted: 2019-01-01

## 2019-03-15 PROBLEM — I74.3 ARTERIAL EMBOLISM AND THROMBOSIS OF LOWER EXTREMITY (HCC): Status: ACTIVE | Noted: 2019-01-01

## 2019-03-15 PROBLEM — J18.9 PNEUMONIA: Status: ACTIVE | Noted: 2019-01-01

## 2019-03-15 PROBLEM — I82.402 ACUTE DEEP VEIN THROMBOSIS (DVT) OF LEFT LOWER EXTREMITY (HCC): Status: ACTIVE | Noted: 2019-01-01

## 2019-03-15 NOTE — ED NOTES
Platelet infusion completed prior to shift change, completed per Renetta Pak, Lindsey Rosenberg RN  03/14/19 2023

## 2019-03-15 NOTE — ED NOTES
Patient leaving with [facility] at this time en route to [facility]. Patient's second unit of blood complete prior to departure, vital signs remain stable at departure, patient's port to left chest remains accessed with no signs of infiltration to site. Envelope with EMTALA paper work sent with EMS at this time. Patient's respirations are even and unlabored, VSS, NADN at departure.     Sai Hunter, RN  03/15/19 0616

## 2019-03-15 NOTE — ANESTHESIA POSTPROCEDURE EVALUATION
Patient: Moriah Qiu    Procedure Summary     Date:  03/15/19 Room / Location:  [facility] /  [facility]    Anesthesia Start:  1537 Anesthesia Stop:      Procedure:  ESOPHAGOGASTRODUODENOSCOPY (N/A ) Diagnosis:       Acute blood loss anemia      (Acute blood loss anemia [D62])    Surgeon:  Vamsi Yang MD Provider:  Misael Lebron MD    Anesthesia Type:  general, MAC ASA Status:  4          Anesthesia Type: general, MAC  Last vitals  BP 92/67  103/64 (03/15/19 1515)   Temp 97  97 °F (36.1 °C) (03/15/19 1515)   Pulse 98  78 (03/15/19 1515)   Resp 17  16 (03/15/19 1515)     SpO2 98  91 % (03/15/19 1515)     Post Anesthesia Care and Evaluation    Patient location during evaluation: PACU  Patient participation: complete - patient participated  Level of consciousness: awake and alert  Pain score: 0  Pain management: adequate  Airway patency: patent  Anesthetic complications: No anesthetic complications  PONV Status: none  Cardiovascular status: hemodynamically stable and acceptable  Respiratory status: nonlabored ventilation, acceptable and nasal cannula  Hydration status: acceptable

## 2019-03-15 NOTE — ANESTHESIA PREPROCEDURE EVALUATION
Anesthesia Evaluation                  Airway   Mallampati: I  TM distance: >3 FB  Neck ROM: full  No difficulty expected  Dental      Pulmonary    (+) pneumonia , lung cancer, COPD,   Cardiovascular     ECG reviewed    (+) PVD, DVT,       Neuro/Psych  (+) headaches,     GI/Hepatic/Renal/Endo    (+)  hiatal hernia, GERD, PUD,      Musculoskeletal     (+) back pain,   Abdominal    Substance History      OB/GYN          Other   (+) arthritis   history of cancer                    Anesthesia Plan    ASA 4     general and MAC     intravenous induction   Anesthetic plan, all risks, benefits, and alternatives have been provided, discussed and informed consent has been obtained with: patient.    Plan discussed with CRNA.

## 2019-03-20 NOTE — OUTREACH NOTE
Prep Survey      Responses   Facility patient discharged from?  [facility]   Is patient eligible?  No   What are the reasons patient is not eligible?  [facility]osp[facility]   Does the patient have one of the following disease processes/diagnoses(primary or secondary)?  Other   Prep survey completed?  Yes          Esperanza Calvert, RN

## 2019-04-21 DIAGNOSIS — C34.91 ADENOCARCINOMA OF RIGHT LUNG, STAGE 1 (HCC): ICD-10-CM

## 2019-04-21 RX ORDER — FOLIC ACID 1 MG/1
TABLET ORAL
Qty: 30 TABLET | Refills: 4 | OUTPATIENT
Start: 2019-04-21

## 2019-08-27 NOTE — H&P (VIEW-ONLY)
----- Message from LIU Purvis sent at 8/27/2019 10:14 AM CDT -----  Overall labs stable - both BMP and BNP. Potassium is \"creeping\" up; decrease to 10 mEq daily.  Lab reassessment in 4-6 weeks.   07/10/2018  Patient Information  Moriah Qiu                                                                                          [address]  [place] [address]   1970  'PCP/Referring Physician'  Darline Velasco MD  [phone]  Segundo Patterson MD  [phone]  Chief Complaint   Patient presents with   • Consult     referred back by Dr. Patterson for lung cancer        History of Present Illness: Mrs. Qiu is a 48 year old  female with a history of uterine cancer, active tobacco abuse and COPD who is well known to me status post right VATS lower lobectomy on 9/15/16 for a [id number] Stage IA adenocarcinoma.  She has unfortunately continued to smoke and now has recurrent disease on PET scan 1/18/18 that is present on station 7 lymph nodes biopsied by Dr. Carrasquillo via [facility].  Today, the patient is without complaints.  She denies weight loss, hemoptysis or lymphadenopathy.  The patient has a chronic cough with smoking.        Patient Active Problem List   Diagnosis   • Lung nodule   • Osteoporosis   • Syringomyelia (CMS/HCC)   • Adenocarcinoma of right lung, lower lobe, stage 1   • Osteoarthritis (arthritis due to wear and tear of joints)   • COPD (chronic obstructive pulmonary disease) (CMS/HCC)   • Allergic rhinitis   • Tobacco use     Past Medical History:   Diagnosis Date   • Back pain    • COPD (chronic obstructive pulmonary disease) (CMS/HCC)     emphysema   • Esophagus hernia    • Gall stone    • GERD (gastroesophageal reflux disease)    • History of stomach ulcers    • Lung cancer (CMS/HCC)    • Osteoarthritis (arthritis due to wear and tear of joints)    • Osteoporosis    • Uterine cancer (CMS/HCC) 1997    ovarian 1997   • Wears dentures      Past Surgical History:   Procedure Laterality Date   • BRONCHOSCOPY N/A 8/4/2016    Procedure: NAVIGATIONAL BRONCHOSCOPY;  Surgeon: Elder Tompkins MD;  Location: [facility];  Service:    • BRONCHOSCOPY N/A 8/4/2016    Procedure: BRONCHOSCOPY  WITH ENDOBRONCHIAL ULTRASOUND;  Surgeon: Elder Tompkins MD;  Location:  [facility];  Service:    • BRONCHOSCOPY N/A 6/5/2018    Procedure: BRONCHOSCOPY WITH ENDOBRONCHIAL ULTRASOUND WITH FLUORO;  Surgeon: Kingston Carrasquillo MD;  Location:  [facility];  Service: Pulmonary   • COLONOSCOPY      2016   • GALLBLADDER SURGERY  2016   • HYSTERECTOMY  1997   • THORACOSCOPY Right 9/15/2016    Procedure: BRONOSCOPY, RIGHT THORACOSCOPY VIDEO ASSISTED LOBECTOMY, MEDIASTINAL LYMPH NODE DISECTION;  Surgeon: Elder Ham MD;  Location:  [facility];  Service:        Current Outpatient Prescriptions:   •  cetirizine (zyrTEC) 10 MG tablet, Take 10 mg by mouth Daily., Disp: , Rfl:   •  Cholecalciferol (VITAMIN D3) 1000 UNITS capsule, Take 1 capsule by mouth daily., Disp: , Rfl:   •  dexamethasone (DECADRON) 4 MG tablet, Take 2 tablets in the morning daily on Days 2,3 and 4.  Take with food., Disp: 6 tablet, Rfl: 2  •  famotidine (PEPCID) 40 MG tablet, Take 40 mg by mouth daily., Disp: , Rfl: 0  •  fluticasone (FLONASE) 50 MCG/ACT nasal spray, 2 sprays into each nostril Daily., Disp: , Rfl: 1  •  folic acid (FOLVITE) 1 MG tablet, Take 1 tablet by mouth Daily. Start at least 7 days prior to chemotherapy until at least 3 weeks after all chemotherapy., Disp: 30 tablet, Rfl: 4  •  HYDROcodone-acetaminophen (NORCO)  MG per tablet, Take 1 tablet by mouth every 6 (six) hours as needed for severe pain (7-10)., Disp: , Rfl:   •  lidocaine-prilocaine (EMLA) 2.5-2.5 % cream, Apply  topically As Needed (prior to port access)., Disp: 30 g, Rfl: 3  •  montelukast (SINGULAIR) 10 MG tablet, Take 10 mg by mouth Daily., Disp: , Rfl: 1  •  ondansetron (ZOFRAN) 8 MG tablet, Take 1 tablet by mouth 3 (Three) Times a Day As Needed for Nausea or Vomiting., Disp: 30 tablet, Rfl: 5  •  ondansetron ODT (ZOFRAN-ODT) 4 MG disintegrating tablet, Take 1 tablet by mouth every 8 (eight) hours as needed (for nausea). (Patient taking differently:  Take 4 mg by mouth Every 8 (Eight) Hours As Needed for Nausea (for nausea).), Disp: 30 tablet, Rfl: 0  •  RA CALCIUM 600 600 MG tablet, Take 600 mg by mouth 2 (two) times a day with meals., Disp: , Rfl: 0  •  RA SALINE NASAL SPRAY 0.65 % nasal spray, 1 spray into each nostril Daily As Needed., Disp: , Rfl: 1  No Known Allergies  Social History     Social History   • Marital status:      Spouse name: N/A   • Number of children: 3   • Years of education: N/A     Occupational History   • NOT EMPLOYED       Previous Contractor for the [employer]     Social History Main Topics   • Smoking status: Current Every Day Smoker     Packs/day: 1.00     Years: 28.00     Types: Cigarettes   • Smokeless tobacco: Never Used   • Alcohol use No   • Drug use: No   • Sexual activity: Defer     Other Topics Concern   • Not on file     Social History Narrative    Lives in [place], [place] with .      Family History   Problem Relation Age of Onset   • Cancer Mother    • Alzheimer's disease Father    • Osteoarthritis Other      Review of Systems   Constitution: Negative for chills, fever, malaise/fatigue, night sweats and weight loss.   HENT: Positive for hearing loss. Negative for ear pain, odynophagia and sore throat.    Cardiovascular: Positive for dyspnea on exertion. Negative for chest pain, leg swelling, orthopnea and palpitations.   Respiratory: Positive for cough, shortness of breath and wheezing. Negative for hemoptysis.    Endocrine: Negative for cold intolerance, heat intolerance, polydipsia, polyphagia and polyuria.   Hematologic/Lymphatic: Bruises/bleeds easily.   Skin: Negative for itching and rash.   Musculoskeletal: Negative for joint pain, joint swelling and myalgias.   Gastrointestinal: Positive for diarrhea and heartburn. Negative for abdominal pain, constipation, hematemesis, hematochezia, melena, nausea and vomiting.   Genitourinary: Positive for incomplete emptying, nocturia and urgency. Negative for dysuria,  "frequency and hematuria.   Neurological: Positive for headaches. Negative for focal weakness, numbness and seizures.   Psychiatric/Behavioral: Negative for suicidal ideas. The patient is nervous/anxious.    Allergic/Immunologic: Positive for environmental allergies.   All other systems reviewed and are negative.    Vitals:    07/10/18 1423   BP: 97/75   BP Location: Right arm   Patient Position: Sitting   Pulse: 100   Temp: 97.5 °F (36.4 °C)   TempSrc: Temporal Artery    SpO2: 93%   Weight: 64.2 kg (141 lb 9.6 oz)   Height: 167.6 cm (66\")      Physical Exam   Constitutional: She is oriented to person, place, and time. She appears well-developed and well-nourished. No distress.   HENT:   Head: Normocephalic and atraumatic.   Eyes: Conjunctivae and EOM are normal. No scleral icterus.   Neck: Normal range of motion. Neck supple. No JVD present. No tracheal deviation present.   Cardiovascular: Normal rate, regular rhythm and normal heart sounds.  Exam reveals no gallop and no friction rub.    No murmur heard.  Pulmonary/Chest: Effort normal and breath sounds normal. No stridor. No respiratory distress. She has no wheezes. She has no rales.   Abdominal: Soft. She exhibits no distension and no mass. There is no tenderness. There is no rebound and no guarding.   Musculoskeletal: Normal range of motion. She exhibits no deformity.   Lymphadenopathy:     She has no cervical adenopathy.     She has no axillary adenopathy.        Right: No supraclavicular adenopathy present.        Left: No supraclavicular adenopathy present.   Neurological: She is alert and oriented to person, place, and time.   Skin: No rash noted. No erythema.   Psychiatric: She has a normal mood and affect. Her behavior is normal. Judgment and thought content normal.       Labs/Imaging:  -MRI brain performed 6/26/18, demonstrates no evidence of metastatic disease  -PET/CT performed 6/26/18, personally reviewed, demonstrates an increasing right middle lobe " lesion with an SUV of 4.5.  Right paratracheal lymph node with an SUV of 4.7.  Right hilar nodes with an SUV of 5.6.  Subcarinal lymph nodes with an SUV of 3.4.   Right thyroid with SUV of 3.7    Assessment/Plan: 48 year old  female with a history of uterine cancer, active tobacco abuse and COPD who is status post right VATS lower lobectomy on 9/15/16 for a [id number] Stage IA adenocarcinoma. She presents today for recurrent lung cancer. She has elected to receive treatment with chemoradiation.  Dr. Patterson has requested mediport placement for chemotherapy administration. The risks and benefits of the procedure were discussed with the patient including pain, bleeding, infection, pneumothorax requiring a chest tube, myocardial infarction and death.  She understood these risks and wished to proceed with surgery.     I, Dr. Elder Ham, personally performed the services described in the documentation as scribed in my presence and the documentation is both accurate and complete.        Patient Active Problem List   Diagnosis   • Lung nodule   • Osteoporosis   • Syringomyelia (CMS/HCC)   • Adenocarcinoma of right lung, lower lobe, stage 1   • Osteoarthritis (arthritis due to wear and tear of joints)   • COPD (chronic obstructive pulmonary disease) (CMS/HCC)   • Allergic rhinitis   • Tobacco use

## (undated) DEVICE — ANTIBACTERIAL UNDYED BRAIDED (POLYGLACTIN 910), SYNTHETIC ABSORBABLE SUTURE: Brand: COATED VICRYL

## (undated) DEVICE — SINGLE USE BIOPSY VALVE MAJ-210: Brand: SINGLE USE BIOPSY VALVE (STERILE)

## (undated) DEVICE — BOWL UTIL STRL 32OZ

## (undated) DEVICE — CANNULA,OXY,ADULT,SUPERSOFT,W/7'TUB,UC: Brand: MEDLINE

## (undated) DEVICE — SINGLE-USE BIOPSY FORCEPS: Brand: RADIAL JAW 4

## (undated) DEVICE — AIRWY 90MM NO9

## (undated) DEVICE — SKIN AFFIX SURG ADHESIVE 72/CS 0.55ML: Brand: MEDLINE

## (undated) DEVICE — SUT SILK 2/0 SH 30IN K833H

## (undated) DEVICE — Device: Brand: DEFENDO AIR/WATER/SUCTION AND BIOPSY VALVE

## (undated) DEVICE — BRUSH CYTO BRONCOSCOPE

## (undated) DEVICE — Device: Brand: BALLOON

## (undated) DEVICE — GLV SURG SENSICARE MICRO PF LF 7 STRL

## (undated) DEVICE — SINGLE USE SUCTION VALVE MAJ-209: Brand: SINGLE USE SUCTION VALVE (STERILE)

## (undated) DEVICE — Device: Brand: SINGLE USE ASPIRATION NEEDLE NA-U401SX

## (undated) DEVICE — DECANT BG O JET

## (undated) DEVICE — PAD ARMBRD SURG CONVOL 7.5X20X2IN

## (undated) DEVICE — NDL HYPO ECLPS SFTY 18G 1 1/2IN

## (undated) DEVICE — 3M™ STERI-STRIP™ REINFORCED ADHESIVE SKIN CLOSURES, R1547, 1/2 IN X 4 IN (12 MM X 100 MM), 6 STRIPS/ENVELOPE: Brand: 3M™ STERI-STRIP™

## (undated) DEVICE — ADAPT SWVL FIBROPTIC BRONCH

## (undated) DEVICE — 3M™ IOBAN™ 2 ANTIMICROBIAL INCISE DRAPE 6650EZ: Brand: IOBAN™ 2

## (undated) DEVICE — MEDI-VAC YANKAUER SUCTION HANDLE W/BULBOUS TIP: Brand: CARDINAL HEALTH

## (undated) DEVICE — MEDI-VAC NON-CONDUCTIVE SUCTION TUBING: Brand: CARDINAL HEALTH

## (undated) DEVICE — 3M™ TEGADERM™ IV TRANSPARENT FILM DRESSING WITH BORDER 100 BAGS/CARTON 4 CARTONS/CASE 1633: Brand: 3M™ TEGADERM™

## (undated) DEVICE — SUT MNCRYL PLS ANTIB UD 4/0 PS2 18IN

## (undated) DEVICE — PAD GRND REM POLYHESIVE A/ DISP

## (undated) DEVICE — NDL HYPO ECLPS SFTY 22G 1 1/2IN

## (undated) DEVICE — ST EXT MICROBORE FIX M LL 38IN

## (undated) DEVICE — APPL CHLORAPREP W/TINT 26ML BLU

## (undated) DEVICE — SYR LL TP 10ML STRL

## (undated) DEVICE — PK MINOR SPLT 10

## (undated) DEVICE — TRAP,MUCUS SPECIMEN,40CC: Brand: MEDLINE

## (undated) DEVICE — CANN NASL CO2 DIVIDED A/

## (undated) DEVICE — CVR HNDL LT SURG ACCSSRY BLU STRL

## (undated) DEVICE — SUCTION CANISTER, 2500CC, RIGID: Brand: DEROYAL

## (undated) DEVICE — SYR CONTRL LUERLOK 10CC

## (undated) DEVICE — NDL HYPO SFTY PROEDGE 27G 1 1/4IN GRY

## (undated) DEVICE — HOLDER: Brand: DEROYAL

## (undated) DEVICE — SNAP KOVER: Brand: UNBRANDED

## (undated) DEVICE — SUT SILK 3/0 TIES 18IN A184H

## (undated) DEVICE — SUT MONOCRYL PLS ANTIB UND 3/0  PS1 27IN

## (undated) DEVICE — THE BITE BLOCK MAXI, LATEX FREE STRAP IS USED TO PROTECT THE ENDOSCOPE INSERTION TUBE FROM BEING BITTEN BY THE PATIENT.

## (undated) DEVICE — 3-WAY STOPCOCK: Brand: MAXGUARD

## (undated) DEVICE — INTENDED FOR TISSUE SEPARATION, AND OTHER PROCEDURES THAT REQUIRE A SHARP SURGICAL BLADE TO PUNCTURE OR CUT.: Brand: BARD-PARKER ® STAINLESS STEEL BLADES

## (undated) DEVICE — DRSNG SURESITE WNDW 2.38X2.75